# Patient Record
Sex: FEMALE | Race: WHITE | NOT HISPANIC OR LATINO | Employment: OTHER | URBAN - METROPOLITAN AREA
[De-identification: names, ages, dates, MRNs, and addresses within clinical notes are randomized per-mention and may not be internally consistent; named-entity substitution may affect disease eponyms.]

---

## 2017-01-05 ENCOUNTER — OFFICE VISIT - RIVER FALLS (OUTPATIENT)
Dept: FAMILY MEDICINE | Facility: CLINIC | Age: 62
End: 2017-01-05

## 2017-01-27 ENCOUNTER — OFFICE VISIT - RIVER FALLS (OUTPATIENT)
Dept: FAMILY MEDICINE | Facility: CLINIC | Age: 62
End: 2017-01-27

## 2017-07-06 ENCOUNTER — OFFICE VISIT - RIVER FALLS (OUTPATIENT)
Dept: FAMILY MEDICINE | Facility: CLINIC | Age: 62
End: 2017-07-06

## 2017-10-07 ENCOUNTER — AMBULATORY - RIVER FALLS (OUTPATIENT)
Dept: FAMILY MEDICINE | Facility: CLINIC | Age: 62
End: 2017-10-07

## 2017-10-08 LAB
CHOLEST SERPL-MCNC: 267 MG/DL
CHOLEST/HDLC SERPL: 5.6 {RATIO}
GLUCOSE BLD-MCNC: 102 MG/DL (ref 65–99)
HDLC SERPL-MCNC: 48 MG/DL
LDLC SERPL CALC-MCNC: 189 MG/DL
NONHDLC SERPL-MCNC: 219 MG/DL
TRIGL SERPL-MCNC: 156 MG/DL

## 2017-10-12 ENCOUNTER — OFFICE VISIT - RIVER FALLS (OUTPATIENT)
Dept: FAMILY MEDICINE | Facility: CLINIC | Age: 62
End: 2017-10-12

## 2017-10-13 LAB
CREAT SERPL-MCNC: 0.93 MG/DL (ref 0.5–0.99)
GLUCOSE BLD-MCNC: 92 MG/DL (ref 65–99)

## 2017-10-31 ENCOUNTER — OFFICE VISIT - RIVER FALLS (OUTPATIENT)
Dept: FAMILY MEDICINE | Facility: CLINIC | Age: 62
End: 2017-10-31

## 2017-11-07 ENCOUNTER — OFFICE VISIT - RIVER FALLS (OUTPATIENT)
Dept: FAMILY MEDICINE | Facility: CLINIC | Age: 62
End: 2017-11-07

## 2018-01-17 ENCOUNTER — COMMUNICATION - RIVER FALLS (OUTPATIENT)
Dept: FAMILY MEDICINE | Facility: CLINIC | Age: 63
End: 2018-01-17

## 2018-01-17 ENCOUNTER — OFFICE VISIT - RIVER FALLS (OUTPATIENT)
Dept: FAMILY MEDICINE | Facility: CLINIC | Age: 63
End: 2018-01-17

## 2018-01-17 LAB
CREAT SERPL-MCNC: 0.84 MG/DL (ref 0.57–1.11)
GLUCOSE BLD-MCNC: 103 MG/DL (ref 65–100)

## 2018-01-23 ENCOUNTER — OFFICE VISIT - RIVER FALLS (OUTPATIENT)
Dept: FAMILY MEDICINE | Facility: CLINIC | Age: 63
End: 2018-01-23

## 2018-07-12 ENCOUNTER — OFFICE VISIT - RIVER FALLS (OUTPATIENT)
Dept: FAMILY MEDICINE | Facility: CLINIC | Age: 63
End: 2018-07-12

## 2019-02-04 ENCOUNTER — OFFICE VISIT - RIVER FALLS (OUTPATIENT)
Dept: FAMILY MEDICINE | Facility: CLINIC | Age: 64
End: 2019-02-04

## 2019-02-04 ASSESSMENT — MIFFLIN-ST. JEOR: SCORE: 1605.69

## 2019-02-09 ENCOUNTER — AMBULATORY - RIVER FALLS (OUTPATIENT)
Dept: FAMILY MEDICINE | Facility: CLINIC | Age: 64
End: 2019-02-09

## 2019-02-10 LAB
CHOLEST SERPL-MCNC: 267 MG/DL
CHOLEST/HDLC SERPL: 5.2 {RATIO}
GLUCOSE BLD-MCNC: 102 MG/DL (ref 65–99)
HDLC SERPL-MCNC: 51 MG/DL
LDLC SERPL CALC-MCNC: 190 MG/DL
NONHDLC SERPL-MCNC: 216 MG/DL
TRIGL SERPL-MCNC: 128 MG/DL

## 2019-03-05 ENCOUNTER — OFFICE VISIT - RIVER FALLS (OUTPATIENT)
Dept: FAMILY MEDICINE | Facility: CLINIC | Age: 64
End: 2019-03-05

## 2019-03-19 ENCOUNTER — OFFICE VISIT - RIVER FALLS (OUTPATIENT)
Dept: FAMILY MEDICINE | Facility: CLINIC | Age: 64
End: 2019-03-19

## 2019-04-06 ENCOUNTER — COMMUNICATION - RIVER FALLS (OUTPATIENT)
Dept: FAMILY MEDICINE | Facility: CLINIC | Age: 64
End: 2019-04-06

## 2019-04-06 ENCOUNTER — OFFICE VISIT - RIVER FALLS (OUTPATIENT)
Dept: FAMILY MEDICINE | Facility: CLINIC | Age: 64
End: 2019-04-06

## 2019-04-06 LAB
ALBUMIN UR-MCNC: NEGATIVE G/DL
ANION GAP SERPL CALCULATED.3IONS-SCNC: 13 MMOL/L (ref 5–18)
BASOPHILS # BLD MANUAL: 0 10*3/UL
BASOPHILS NFR BLD MANUAL: 0.1 %
BILIRUB UR QL STRIP: NEGATIVE
BUN SERPL-MCNC: 16 MG/DL (ref 8–25)
BUN/CREAT RATIO - HISTORICAL: 20 (ref 10–20)
CALCIUM SERPL-MCNC: 9.7 MG/DL (ref 8.5–10.5)
CHLORIDE BLD-SCNC: 104 MMOL/L (ref 98–110)
CO2 SERPL-SCNC: 26 MMOL/L (ref 21–31)
CREAT SERPL-MCNC: 0.8 MG/DL (ref 0.57–1.11)
EOSINOPHIL # BLD MANUAL: 0.1 10*3/UL
EOSINOPHIL NFR BLD MANUAL: 1 %
ERYTHROCYTE [DISTWIDTH] IN BLOOD BY AUTOMATED COUNT: 14.5 % (ref 11.5–15.5)
GFR ESTIMATE EXT - HISTORICAL: >60
GLUCOSE BLD-MCNC: 100 MG/DL (ref 65–100)
GLUCOSE UR STRIP-MCNC: NEGATIVE MG/DL
HCT VFR BLD AUTO: 40.7 % (ref 33–51)
HGB BLD-MCNC: 13.4 G/DL (ref 12–16)
HGB UR QL STRIP: NEGATIVE
KETONES UR STRIP-MCNC: NEGATIVE MG/DL
LEUKOCYTE ESTERASE UR QL STRIP: NEGATIVE
LYMPHOCYTES # BLD MANUAL: 1.1 10*3/UL (ref 0.9–2.9)
LYMPHOCYTES NFR BLD MANUAL: 13.2 %
MCH RBC QN AUTO: 29 PG (ref 26–34)
MCHC RBC AUTO-ENTMCNC: 32.9 G/DL (ref 32–36)
MCV RBC AUTO: 88 FL (ref 80–100)
MONOCYTES # BLD MANUAL: 0.4 10*3/UL
MONOCYTES NFR BLD MANUAL: 5.1 %
NEUTROPHILS # BLD MANUAL: 6.5 10*3/UL (ref 1.7–7)
NEUTROPHILS NFR BLD MANUAL: 80.6 %
NITRATE UR QL: NEGATIVE
PH UR STRIP: 7 [PH] (ref 5–8)
PLATELET # BLD AUTO: 185 10*3/UL (ref 140–440)
PMV BLD: 10.2 FL (ref 6.5–11)
POTASSIUM BLD-SCNC: 3.9 MMOL/L (ref 3.5–5)
RBC # BLD AUTO: 4.62 10*6/UL (ref 4–5.2)
SODIUM SERPL-SCNC: 143 MMOL/L (ref 135–145)
SP GR UR STRIP: 1.01 (ref 1–1.03)
WBC # BLD AUTO: 8.1 10*3/UL (ref 4.5–11)

## 2019-05-30 ENCOUNTER — OFFICE VISIT - RIVER FALLS (OUTPATIENT)
Dept: FAMILY MEDICINE | Facility: CLINIC | Age: 64
End: 2019-05-30

## 2019-09-19 ENCOUNTER — OFFICE VISIT - RIVER FALLS (OUTPATIENT)
Dept: FAMILY MEDICINE | Facility: CLINIC | Age: 64
End: 2019-09-19
Payer: COMMERCIAL

## 2020-02-20 ENCOUNTER — OFFICE VISIT - RIVER FALLS (OUTPATIENT)
Dept: FAMILY MEDICINE | Facility: CLINIC | Age: 65
End: 2020-02-20
Payer: COMMERCIAL

## 2020-02-20 ENCOUNTER — AMBULATORY - RIVER FALLS (OUTPATIENT)
Dept: FAMILY MEDICINE | Facility: CLINIC | Age: 65
End: 2020-02-20
Payer: COMMERCIAL

## 2020-02-21 ENCOUNTER — COMMUNICATION - RIVER FALLS (OUTPATIENT)
Dept: FAMILY MEDICINE | Facility: CLINIC | Age: 65
End: 2020-02-21
Payer: COMMERCIAL

## 2020-02-21 LAB
CHOLEST SERPL-MCNC: 266 MG/DL
CHOLEST/HDLC SERPL: 5.3 {RATIO}
GLUCOSE BLD-MCNC: 101 MG/DL (ref 65–99)
HDLC SERPL-MCNC: 50 MG/DL
LDLC SERPL CALC-MCNC: 187 MG/DL
NONHDLC SERPL-MCNC: 216 MG/DL
TRIGL SERPL-MCNC: 148 MG/DL

## 2020-03-10 ENCOUNTER — OFFICE VISIT - RIVER FALLS (OUTPATIENT)
Dept: FAMILY MEDICINE | Facility: CLINIC | Age: 65
End: 2020-03-10
Payer: COMMERCIAL

## 2020-03-10 ASSESSMENT — MIFFLIN-ST. JEOR: SCORE: 1638.12

## 2020-08-31 ENCOUNTER — OFFICE VISIT - RIVER FALLS (OUTPATIENT)
Dept: FAMILY MEDICINE | Facility: CLINIC | Age: 65
End: 2020-08-31
Payer: COMMERCIAL

## 2020-09-15 ENCOUNTER — OFFICE VISIT - RIVER FALLS (OUTPATIENT)
Dept: FAMILY MEDICINE | Facility: CLINIC | Age: 65
End: 2020-09-15
Payer: COMMERCIAL

## 2020-11-03 ENCOUNTER — AMBULATORY - RIVER FALLS (OUTPATIENT)
Dept: FAMILY MEDICINE | Facility: CLINIC | Age: 65
End: 2020-11-03
Payer: COMMERCIAL

## 2020-11-03 ENCOUNTER — OFFICE VISIT - RIVER FALLS (OUTPATIENT)
Dept: FAMILY MEDICINE | Facility: CLINIC | Age: 65
End: 2020-11-03
Payer: COMMERCIAL

## 2020-11-08 ENCOUNTER — NURSE TRIAGE (OUTPATIENT)
Dept: NURSING | Facility: CLINIC | Age: 65
End: 2020-11-08

## 2020-11-08 NOTE — TELEPHONE ENCOUNTER
Called for the result of COVID-19 infection testing that was done last Tuesday (11/3).  This triage nurse could not see the result, caller was advised to call [facility] in the morning.  Kate Casiano RN

## 2020-11-09 ENCOUNTER — COMMUNICATION - RIVER FALLS (OUTPATIENT)
Dept: FAMILY MEDICINE | Facility: CLINIC | Age: 65
End: 2020-11-09
Payer: COMMERCIAL

## 2021-01-09 ENCOUNTER — HEALTH MAINTENANCE LETTER (OUTPATIENT)
Age: 66
End: 2021-01-09

## 2021-01-22 ENCOUNTER — AMBULATORY - RIVER FALLS (OUTPATIENT)
Dept: FAMILY MEDICINE | Facility: CLINIC | Age: 66
End: 2021-01-22
Payer: COMMERCIAL

## 2021-01-23 ENCOUNTER — COMMUNICATION - RIVER FALLS (OUTPATIENT)
Dept: FAMILY MEDICINE | Facility: CLINIC | Age: 66
End: 2021-01-23
Payer: COMMERCIAL

## 2021-01-23 LAB
CHOLEST SERPL-MCNC: 255 MG/DL
CHOLEST/HDLC SERPL: 4.9 {RATIO}
GLUCOSE BLD-MCNC: 106 MG/DL (ref 65–99)
HDLC SERPL-MCNC: 52 MG/DL
LDLC SERPL CALC-MCNC: 176 MG/DL
NONHDLC SERPL-MCNC: 203 MG/DL
TRIGL SERPL-MCNC: 132 MG/DL

## 2021-01-28 ENCOUNTER — OFFICE VISIT - RIVER FALLS (OUTPATIENT)
Dept: FAMILY MEDICINE | Facility: CLINIC | Age: 66
End: 2021-01-28
Payer: COMMERCIAL

## 2021-01-28 ASSESSMENT — MIFFLIN-ST. JEOR: SCORE: 1621.79

## 2021-02-01 ENCOUNTER — AMBULATORY - RIVER FALLS (OUTPATIENT)
Dept: FAMILY MEDICINE | Facility: CLINIC | Age: 66
End: 2021-02-01
Payer: COMMERCIAL

## 2021-04-12 ENCOUNTER — OFFICE VISIT - RIVER FALLS (OUTPATIENT)
Dept: FAMILY MEDICINE | Facility: CLINIC | Age: 66
End: 2021-04-12
Payer: COMMERCIAL

## 2021-04-26 ENCOUNTER — OFFICE VISIT - RIVER FALLS (OUTPATIENT)
Dept: FAMILY MEDICINE | Facility: CLINIC | Age: 66
End: 2021-04-26
Payer: COMMERCIAL

## 2021-04-26 ASSESSMENT — MIFFLIN-ST. JEOR: SCORE: 1619.98

## 2021-05-03 ENCOUNTER — COMMUNICATION - RIVER FALLS (OUTPATIENT)
Dept: FAMILY MEDICINE | Facility: CLINIC | Age: 66
End: 2021-05-03
Payer: COMMERCIAL

## 2021-05-25 ENCOUNTER — RECORDS - HEALTHEAST (OUTPATIENT)
Dept: ADMINISTRATIVE | Facility: CLINIC | Age: 66
End: 2021-05-25

## 2021-08-06 ENCOUNTER — OFFICE VISIT - RIVER FALLS (OUTPATIENT)
Dept: FAMILY MEDICINE | Facility: CLINIC | Age: 66
End: 2021-08-06
Payer: COMMERCIAL

## 2021-08-14 ENCOUNTER — OFFICE VISIT - RIVER FALLS (OUTPATIENT)
Dept: FAMILY MEDICINE | Facility: CLINIC | Age: 66
End: 2021-08-14
Payer: COMMERCIAL

## 2021-10-11 ENCOUNTER — HEALTH MAINTENANCE LETTER (OUTPATIENT)
Age: 66
End: 2021-10-11

## 2021-10-20 ENCOUNTER — OFFICE VISIT - RIVER FALLS (OUTPATIENT)
Dept: FAMILY MEDICINE | Facility: CLINIC | Age: 66
End: 2021-10-20
Payer: COMMERCIAL

## 2021-10-20 ENCOUNTER — TRANSFERRED RECORDS (OUTPATIENT)
Dept: HEALTH INFORMATION MANAGEMENT | Facility: CLINIC | Age: 66
End: 2021-10-20
Payer: COMMERCIAL

## 2021-10-20 ENCOUNTER — MEDICAL CORRESPONDENCE (OUTPATIENT)
Dept: HEALTH INFORMATION MANAGEMENT | Facility: CLINIC | Age: 66
End: 2021-10-20
Payer: COMMERCIAL

## 2021-11-02 ENCOUNTER — TELEPHONE (OUTPATIENT)
Dept: ALLERGY | Facility: CLINIC | Age: 66
End: 2021-11-02

## 2021-11-16 ENCOUNTER — OFFICE VISIT (OUTPATIENT)
Dept: ALLERGY | Facility: CLINIC | Age: 66
End: 2021-11-16
Payer: COMMERCIAL

## 2021-11-16 VITALS — OXYGEN SATURATION: 100 % | HEART RATE: 95 BPM | TEMPERATURE: 98.6 F

## 2021-11-16 DIAGNOSIS — J30.1 SEASONAL ALLERGIC RHINITIS DUE TO POLLEN: Primary | ICD-10-CM

## 2021-11-16 DIAGNOSIS — J30.89 ALLERGIC RHINITIS DUE TO DUST MITE: ICD-10-CM

## 2021-11-16 DIAGNOSIS — J30.81 ALLERGIC RHINITIS DUE TO ANIMALS: ICD-10-CM

## 2021-11-16 DIAGNOSIS — J45.30 MILD PERSISTENT ASTHMA WITHOUT COMPLICATION: ICD-10-CM

## 2021-11-16 DIAGNOSIS — T78.1XXD POLLEN-FOOD ALLERGY, SUBSEQUENT ENCOUNTER: ICD-10-CM

## 2021-11-16 PROCEDURE — 86003 ALLG SPEC IGE CRUDE XTRC EA: CPT | Performed by: ALLERGY & IMMUNOLOGY

## 2021-11-16 PROCEDURE — 95004 PERQ TESTS W/ALRGNC XTRCS: CPT | Performed by: ALLERGY & IMMUNOLOGY

## 2021-11-16 PROCEDURE — 99203 OFFICE O/P NEW LOW 30 MIN: CPT | Mod: 25 | Performed by: ALLERGY & IMMUNOLOGY

## 2021-11-16 PROCEDURE — 99000 SPECIMEN HANDLING OFFICE-LAB: CPT | Performed by: ALLERGY & IMMUNOLOGY

## 2021-11-16 PROCEDURE — 86003 ALLG SPEC IGE CRUDE XTRC EA: CPT | Mod: 90 | Performed by: ALLERGY & IMMUNOLOGY

## 2021-11-16 PROCEDURE — 36415 COLL VENOUS BLD VENIPUNCTURE: CPT | Performed by: ALLERGY & IMMUNOLOGY

## 2021-11-16 RX ORDER — AMITRIPTYLINE HYDROCHLORIDE 10 MG/1
10 TABLET ORAL AT BEDTIME
COMMUNITY
End: 2022-11-14

## 2021-11-16 RX ORDER — FLUTICASONE PROPIONATE 50 MCG
2 SPRAY, SUSPENSION (ML) NASAL
COMMUNITY
End: 2022-11-14

## 2021-11-16 RX ORDER — ALBUTEROL SULFATE 90 UG/1
1-2 AEROSOL, METERED RESPIRATORY (INHALATION) EVERY 4 HOURS PRN
COMMUNITY
End: 2022-11-14

## 2021-11-16 RX ORDER — MONTELUKAST SODIUM 10 MG/1
10 TABLET ORAL DAILY
COMMUNITY
Start: 2021-10-20 | End: 2022-03-07

## 2021-11-16 ASSESSMENT — ASTHMA QUESTIONNAIRES
QUESTION_4 LAST FOUR WEEKS HOW OFTEN HAVE YOU USED YOUR RESCUE INHALER OR NEBULIZER MEDICATION (SUCH AS ALBUTEROL): NOT AT ALL
QUESTION_2 LAST FOUR WEEKS HOW OFTEN HAVE YOU HAD SHORTNESS OF BREATH: THREE TO SIX TIMES A WEEK
QUESTION_1 LAST FOUR WEEKS HOW MUCH OF THE TIME DID YOUR ASTHMA KEEP YOU FROM GETTING AS MUCH DONE AT WORK, SCHOOL OR AT HOME: A LITTLE OF THE TIME
ACT_TOTALSCORE: 21
QUESTION_5 LAST FOUR WEEKS HOW WOULD YOU RATE YOUR ASTHMA CONTROL: WELL CONTROLLED
QUESTION_3 LAST FOUR WEEKS HOW OFTEN DID YOUR ASTHMA SYMPTOMS (WHEEZING, COUGHING, SHORTNESS OF BREATH, CHEST TIGHTNESS OR PAIN) WAKE YOU UP AT NIGHT OR EARLIER THAN USUAL IN THE MORNING: NOT AT ALL

## 2021-11-16 NOTE — LETTER
11/16/2021         RE: María Elena Whitten  [address] [facility] [address]        Dear Colleague,    Thank you for referring your patient, María Elena Whitten, to the [facility]. Allergy testing was inconclusive, so I have asked her to undergo further testing. Please see a copy of my visit note below.          Subjective       HPI     Chief complaint: Allergies    History of present illness: This is a pleasant 66-year-old woman I was asked to see for evaluation by Dr. Mcfadden in regards to allergies.  Patient states she had allergies her whole life and symptoms occur throughout the year.  She has itchy eyes, watery eyes, sneezing and drainage.  She has tried several over-the-counter medications such as Allegra.  This does not seem to help.  She does use Flonase every day.  She states recently she was started on montelukast but she is not sure is helping.  She does have a history of asthma that is triggered by her allergies but reports that her asthma is well controlled.  She denies any immediate cough, wheeze or shortness of breath.  She currently takes Advair 250/50 1 puff twice daily.  Rare need for her albuterol inhaler.  She was tested for allergies many years ago.  She was not on allergy shots at that time but states she was as a child and had several reactions to her allergy shots.  She does note with bita, she has experienced an allergic reaction.    Past medical history: Cholecystitis, osteopenia, reflux, arthritis, tonsillectomy, cholecystectomy, hysterectomy    Social history: She is retired, she has cats at home, non-smoking environment, lives in a home with central air and a basement    Family history: Mother with allergies    Review of Systems   Constitutional, HEENT, cardiovascular, pulmonary, gi and gu systems are negative, except as otherwise noted.      Objective    Pulse 95   Temp 98.6  F (37  C)   SpO2 100%   There is no height or weight on file to calculate  BMI.  Physical Exam   Gen: Pleasant female not in acute distress  HEENT: Eyes no erythema of the bulbar or palpebral conjunctiva, no edema. Ears: No deformities or lesions nose: No congestion, mucosa normal. Mouth: Throat clear, no lip or tongue edema.   Cardiac: Regular rate and rhythm, no murmurs, rubs or gallops  Respiratory: Clear to auscultation bilaterally, no adventitious breath sounds  Lymph: No visible supraclavicular or cervical lymphadenopathy  Skin: No rashes or lesions  Psych: Alert and oriented times 3    30 percutaneous test were undertaken today environmental skin test panel positive histamine control and a positive test to ragweed, however, she did not have a flare with the ragweed response.  Please see scanned photograph.    Impression report and plan:  1.  Allergic rhinitis  2.  Likely oral allergy syndrome  3.  Asthma    Her allergy testing was not as positive as I was expecting.  This could be due to the fact that she takes amitriptyline.  I asked her to undergo specific IgE testing as well.  Pending this test, could consider allergy immunotherapy.  I went over the risks and benefits of allergy shots.  I stated risks include hives, swelling, shortness of breath.  I did state that one in 2.5 million shot administrations can result in death.  I stated they must wait in the office for 30 minutes following the shot and carry an epinephrine device on the day of the shot.  I stated that shots are effective in about 90% of patients.  I stated that they should check with the insurance company prior to proceeding.  They understand the risks and benefits and will let me know if she would like to proceed.  For now, suggested Astelin nasal spray.  Her asthma is well controlled with Advair.  I would continue her on this medication.  Notify if using albuterol greater than 2 times per Opal exercise.  Continued avoidance of melons.            Again, thank you for allowing me to participate in the care of  your patient.        Sincerely,        Diane MOBLEY MD

## 2021-11-16 NOTE — PROGRESS NOTES
Subjective       HPI     Chief complaint: Allergies    History of present illness: This is a pleasant 66-year-old woman I was asked to see for evaluation by Dr. Mcfadden in regards to allergies.  Patient states she had allergies her whole life and symptoms occur throughout the year.  She has itchy eyes, watery eyes, sneezing and drainage.  She has tried several over-the-counter medications such as Allegra.  This does not seem to help.  She does use Flonase every day.  She states recently she was started on montelukast but she is not sure is helping.  She does have a history of asthma that is triggered by her allergies but reports that her asthma is well controlled.  She denies any immediate cough, wheeze or shortness of breath.  She currently takes Advair 250/50 1 puff twice daily.  Rare need for her albuterol inhaler.  She was tested for allergies many years ago.  She was not on allergy shots at that time but states she was as a child and had several reactions to her allergy shots.  She does note with bita, she has experienced an allergic reaction.    Past medical history: Cholecystitis, osteopenia, reflux, arthritis, tonsillectomy, cholecystectomy, hysterectomy    Social history: She is retired, she has cats at home, non-smoking environment, lives in a home with central air and a basement    Family history: Mother with allergies    Review of Systems   Constitutional, HEENT, cardiovascular, pulmonary, gi and gu systems are negative, except as otherwise noted.      Objective    Pulse 95   Temp 98.6  F (37  C)   SpO2 100%   There is no height or weight on file to calculate BMI.  Physical Exam   Gen: Pleasant female not in acute distress  HEENT: Eyes no erythema of the bulbar or palpebral conjunctiva, no edema. Ears: No deformities or lesions nose: No congestion, mucosa normal. Mouth: Throat clear, no lip or tongue edema.   Cardiac: Regular rate and rhythm, no murmurs, rubs or gallops  Respiratory: Clear to  auscultation bilaterally, no adventitious breath sounds  Lymph: No visible supraclavicular or cervical lymphadenopathy  Skin: No rashes or lesions  Psych: Alert and oriented times 3    30 percutaneous test were undertaken today environmental skin test panel positive histamine control and a positive test to ragweed, however, she did not have a flare with the ragweed response.  Please see scanned photograph.    Impression report and plan:  1.  Allergic rhinitis  2.  Likely oral allergy syndrome  3.  Asthma    Her allergy testing was not as positive as I was expecting.  This could be due to the fact that she takes amitriptyline.  I asked her to undergo specific IgE testing as well.  Pending this test, could consider allergy immunotherapy.  I went over the risks and benefits of allergy shots.  I stated risks include hives, swelling, shortness of breath.  I did state that one in 2.5 million shot administrations can result in death.  I stated they must wait in the office for 30 minutes following the shot and carry an epinephrine device on the day of the shot.  I stated that shots are effective in about 90% of patients.  I stated that they should check with the insurance company prior to proceeding.  They understand the risks and benefits and will let me know if she would like to proceed.  For now, suggested Astelin nasal spray.  Her asthma is well controlled with Advair.  I would continue her on this medication.  Notify if using albuterol greater than 2 times per Opal exercise.  Continued avoidance of melons.

## 2021-11-17 ASSESSMENT — ASTHMA QUESTIONNAIRES: ACT_TOTALSCORE: 21

## 2021-11-18 DIAGNOSIS — J31.0 CHRONIC RHINITIS: Primary | ICD-10-CM

## 2021-11-18 LAB
A ALTERNATA IGE QN: <0.1 KU(A)/L
A FUMIGATUS IGE QN: <0.1 KU(A)/L
C HERBARUM IGE QN: <0.1 KU(A)/L
CAT DANDER IGG QN: <0.1 KU(A)/L
COTTONWOOD IGE QN: <0.1 KU(A)/L
D FARINAE IGE QN: <0.1 KU(A)/L
D PTERONYSS IGE QN: <0.1 KU(A)/L
DOG DANDER+EPITH IGE QN: <0.1 KU(A)/L
ENGL PLANTAIN IGE QN: <0.1 KU(A)/L
GOOSEFOOT IGE QN: <0.1 KU(A)/L
JOHNSON GRASS IGE QN: <0.1 KU(A)/L
MAPLE IGE QN: <0.1 KU(A)/L
MUGWORT IGE QN: <0.1 KU(A)/L
P NOTATUM IGE QN: <0.1 KU(A)/L
RED MULBERRY IGE QN: <0.1 KU(A)/L
SILVER BIRCH IGE QN: <0.1 KU(A)/L
TIMOTHY IGE QN: <0.1 KU(A)/L
WHITE ASH IGE QN: <0.1 KU(A)/L
WHITE ELM IGE QN: <0.1 KU(A)/L
WHITE OAK IGE QN: <0.1 KU(A)/L
YELLOW DOCK IGE QN: <0.1 KU(A)/L

## 2021-11-18 RX ORDER — AZELASTINE 1 MG/ML
2 SPRAY, METERED NASAL 2 TIMES DAILY
Qty: 30 ML | Refills: 3 | Status: SHIPPED | OUTPATIENT
Start: 2021-11-18 | End: 2022-03-07

## 2021-11-23 LAB — WHITE HICKORY IGE QN: <0.35 KU/L

## 2022-01-07 ENCOUNTER — OFFICE VISIT - RIVER FALLS (OUTPATIENT)
Dept: FAMILY MEDICINE | Facility: CLINIC | Age: 67
End: 2022-01-07
Payer: COMMERCIAL

## 2022-01-31 ENCOUNTER — OFFICE VISIT - RIVER FALLS (OUTPATIENT)
Dept: FAMILY MEDICINE | Facility: CLINIC | Age: 67
End: 2022-01-31
Payer: COMMERCIAL

## 2022-01-31 ENCOUNTER — TRANSFERRED RECORDS (OUTPATIENT)
Dept: MULTI SPECIALTY CLINIC | Facility: CLINIC | Age: 67
End: 2022-01-31
Payer: COMMERCIAL

## 2022-02-01 ENCOUNTER — COMMUNICATION - RIVER FALLS (OUTPATIENT)
Dept: FAMILY MEDICINE | Facility: CLINIC | Age: 67
End: 2022-02-01
Payer: COMMERCIAL

## 2022-02-01 LAB
CHOLEST SERPL-MCNC: 257 MG/DL
CHOLEST/HDLC SERPL: 5 {RATIO}
GLUCOSE BLD-MCNC: 110 MG/DL (ref 65–99)
HDLC SERPL-MCNC: 51 MG/DL
LDLC SERPL CALC-MCNC: 185 MG/DL
NONHDLC SERPL-MCNC: 206 MG/DL
TRIGL SERPL-MCNC: 94 MG/DL

## 2022-02-06 ENCOUNTER — HEALTH MAINTENANCE LETTER (OUTPATIENT)
Age: 67
End: 2022-02-06

## 2022-02-11 VITALS
WEIGHT: 253.6 LBS | DIASTOLIC BLOOD PRESSURE: 82 MMHG | HEART RATE: 89 BPM | TEMPERATURE: 98.6 F | BODY MASS INDEX: 48.71 KG/M2 | OXYGEN SATURATION: 96 % | SYSTOLIC BLOOD PRESSURE: 148 MMHG

## 2022-02-11 VITALS
DIASTOLIC BLOOD PRESSURE: 82 MMHG | HEART RATE: 80 BPM | BODY MASS INDEX: 47.8 KG/M2 | TEMPERATURE: 98 F | DIASTOLIC BLOOD PRESSURE: 76 MMHG | HEIGHT: 61 IN | SYSTOLIC BLOOD PRESSURE: 132 MMHG | BODY MASS INDEX: 47.8 KG/M2 | HEIGHT: 61 IN | SYSTOLIC BLOOD PRESSURE: 128 MMHG | HEART RATE: 92 BPM | TEMPERATURE: 98.1 F | OXYGEN SATURATION: 99 % | OXYGEN SATURATION: 99 %

## 2022-02-11 VITALS
BODY MASS INDEX: 48.71 KG/M2 | DIASTOLIC BLOOD PRESSURE: 80 MMHG | TEMPERATURE: 99.2 F | HEIGHT: 61 IN | SYSTOLIC BLOOD PRESSURE: 140 MMHG | HEART RATE: 76 BPM

## 2022-02-11 VITALS
WEIGHT: 253 LBS | DIASTOLIC BLOOD PRESSURE: 72 MMHG | HEART RATE: 82 BPM | BODY MASS INDEX: 47.77 KG/M2 | SYSTOLIC BLOOD PRESSURE: 126 MMHG | HEIGHT: 61 IN | BODY MASS INDEX: 48.56 KG/M2 | TEMPERATURE: 97 F | HEART RATE: 60 BPM | SYSTOLIC BLOOD PRESSURE: 112 MMHG | HEIGHT: 61 IN | DIASTOLIC BLOOD PRESSURE: 78 MMHG

## 2022-02-11 VITALS
HEART RATE: 82 BPM | BODY MASS INDEX: 47.84 KG/M2 | DIASTOLIC BLOOD PRESSURE: 76 MMHG | WEIGHT: 253.4 LBS | TEMPERATURE: 97.5 F | SYSTOLIC BLOOD PRESSURE: 132 MMHG | HEIGHT: 61 IN

## 2022-02-11 VITALS
HEART RATE: 86 BPM | SYSTOLIC BLOOD PRESSURE: 126 MMHG | SYSTOLIC BLOOD PRESSURE: 138 MMHG | TEMPERATURE: 97.4 F | BODY MASS INDEX: 48.52 KG/M2 | HEIGHT: 61 IN | HEIGHT: 61 IN | DIASTOLIC BLOOD PRESSURE: 83 MMHG | DIASTOLIC BLOOD PRESSURE: 78 MMHG | WEIGHT: 257 LBS | TEMPERATURE: 97.4 F | HEART RATE: 74 BPM

## 2022-02-11 VITALS
SYSTOLIC BLOOD PRESSURE: 138 MMHG | HEART RATE: 74 BPM | WEIGHT: 251.6 LBS | HEIGHT: 61 IN | DIASTOLIC BLOOD PRESSURE: 80 MMHG | TEMPERATURE: 97.7 F | BODY MASS INDEX: 47.5 KG/M2

## 2022-02-11 VITALS
SYSTOLIC BLOOD PRESSURE: 130 MMHG | TEMPERATURE: 99.9 F | HEART RATE: 101 BPM | DIASTOLIC BLOOD PRESSURE: 76 MMHG | HEART RATE: 86 BPM | DIASTOLIC BLOOD PRESSURE: 60 MMHG | SYSTOLIC BLOOD PRESSURE: 107 MMHG | HEIGHT: 61 IN | BODY MASS INDEX: 49.94 KG/M2 | TEMPERATURE: 98.7 F

## 2022-02-11 VITALS
SYSTOLIC BLOOD PRESSURE: 142 MMHG | BODY MASS INDEX: 49.94 KG/M2 | TEMPERATURE: 98.2 F | HEART RATE: 86 BPM | SYSTOLIC BLOOD PRESSURE: 140 MMHG | HEIGHT: 61 IN | HEART RATE: 76 BPM | DIASTOLIC BLOOD PRESSURE: 78 MMHG | TEMPERATURE: 98.7 F | DIASTOLIC BLOOD PRESSURE: 80 MMHG

## 2022-02-11 VITALS — DIASTOLIC BLOOD PRESSURE: 80 MMHG | TEMPERATURE: 98.9 F | SYSTOLIC BLOOD PRESSURE: 126 MMHG | HEART RATE: 74 BPM

## 2022-02-11 VITALS — HEART RATE: 84 BPM | DIASTOLIC BLOOD PRESSURE: 78 MMHG | TEMPERATURE: 99.2 F | SYSTOLIC BLOOD PRESSURE: 134 MMHG

## 2022-02-11 VITALS
TEMPERATURE: 98.4 F | BODY MASS INDEX: 48.56 KG/M2 | SYSTOLIC BLOOD PRESSURE: 128 MMHG | HEIGHT: 61 IN | DIASTOLIC BLOOD PRESSURE: 86 MMHG | HEART RATE: 80 BPM

## 2022-02-15 NOTE — TELEPHONE ENCOUNTER
Entered by Nikki Cooley CMA on November 09, 2020 10:49:32 AM CST  Kumar Ring,    We do not have your COVID 19 results back yet. We will notify you once we received the results. Our labs have seen an influx of COVID tests which has caused longer wait times for results.     We do apologize    Nikki      ---------------------  From: MARÍA ELENA ESPARZA  To: [facility]  Sent: 11/09/2020 10:32 a.m. CST  Subject: Covid Test results  I was tested last Tuesday and have not received any results yet.  The curbside is in my portal and nothing beyond that.  Could someone please find this information for me.  María Elena Esparza  [phone]---------------------  From: Nikki Cooley CMA (Phone Messages Pool ([id number]_[facility]))   To: MARÍA ELENA ESPARZA    Sent: 11/9/2020 10:49:46 AM CST  Subject: RE: Covid Test results

## 2022-02-15 NOTE — PROGRESS NOTES
Chief Complaint    Shoulder pain on the right side.  Lifted something heavy.  Fell into truck.  History of Present Illness       Lifted a heavy box on Saturday. Fell into truck and right shoulder hit truck. Certain movements cause pain.  Review of Systems       No weakness       No numbness  Physical Exam   Vitals & Measurements    T: 97  F (Tympanic)  HR: 60 (Peripheral)  BP: 126/78     HT: 61 in        General: No acute distress       Musculoskeletal: Normal gait.  Good strength and range of motion of the right upper extremity.  Tender over the distal clavicle.  Has some pain with internal rotation.  Rotator cuff muscles intact.  Neer's and Guzman tests are negative.  Speeds maneuver and Yergason's maneuver are negative       Skin: No bruising or swelling  Assessment/Plan       Right shoulder pain: Contusion and sprain.  Discussed muscle strengthening exercises.  We will continue Advil up to 800 mg 3 times daily with food.  Follow-up if not improving.  Patient Information     Name:ANDRADE ESPARZA      Address:      [address] [place] [phone]     Sex:Female     YOB: 1955     [phone]     Emergency Contact:TOMASA HANSON     MRN:[id number]     FIN:[id number]     Location:[facility]     Date of Service:04/12/2021      Primary Care Physician:       Jack Mcfadden MD, [phone]      Attending Physician:       Jack Mcfadden MD, [phone]  Problem List/Past Medical History    Ongoing     Allergic asthma       Comments: fall/spring- mild persistant     Cholecystitis     Dyslipidemia     GERD (gastroesophageal reflux disease)     History of adenomatous polyp of colon     IBS (irritable bowel syndrome)     Obese     Osteopenia     Prediabetes    Historical     Chicken pox  Medications    Advair Diskus 100 mcg-50 mcg inhalation powder, 1 puff(s), Inhale, bid, 11 refills    albuterol 90 mcg/inh inhalation aerosol, 2 puff(s), Inhale, 1-4x/day, PRN, 2 refills    amitriptyline  10 mg oral tablet, 20 mg= 2 tab(s), Oral, hs, 3 refills    Cipro 500 mg oral tablet, 500 mg= 1 tab(s), Oral, q12 hrs    EpiPen 2-Wilver 0.3 mg injectable kit, 0.3 mg, IM, once, 1 refills    hyoscyamine 0.125 mg oral tablet, 0.125 mg= 1 tab(s), Oral, qid, PRN, 3 refills    metroNIDAZOLE 500 mg oral tablet, 500 mg= 1 tab(s), Oral, q8 hrs    Nasonex 50 mcg/inh nasal spray, 2 spray(s), Nasal, daily, PRN, 3 refills    pantoprazole 40 mg oral delayed release tablet, 1 tab(s), Oral, daily, 3 refills  Allergies    Augmentin (hives)    Zithromax (nausea, diarrhea)    doxycycline (Hives)

## 2022-02-15 NOTE — PROGRESS NOTES
Patient:   ANDRADE ESPARZA            MRN: [id number]            FIN: [id number]               Age:   64 years     Sex:  Female     :  1955   Associated Diagnoses:   Right shoulder pain; Prediabetes; IBS (irritable bowel syndrome); GERD (gastroesophageal reflux disease); Dyslipidemia; Allergic asthma   Author:   Jack Mcfadden MD      Visit Information      Date of Service: 03/10/2020 02:20 pm  Performing Location: [facility]  Encounter#: [id number]      Primary Care Provider (PCP):  Jack Mcfadden MD    NPI# [id number]      Referring Provider:  Jack Mcfadden MD    NPI# [id number]      Chief Complaint   3/10/2020 2:33 PM CDT    Patient presents with ongoing pain in right shoulder. States there is a clicking noise      History of Present Illness   Tripped on pot and fell backwards hitting right shoulder on the truck 3-4 weeks ago. Pain improved 50-60% with naprosyn (upset stomach) and muscle strengthening exercises. Still has some clicking and pain when reaching forward for things.     IBS well controlled.  Asthma controlled with advair. Uses it twice daily during allergy season and otherwise once daily.    Rarely needs the hyoscyamine. Imodium helps.  Protonix helps acid reflux. Missed for two days and having symptoms  Had a low stress year and IBS controlled. Probiotic has even helped.  Had pollen related anaphylactic reaction in past.    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 Feels she has ribs popping in and out place at time dependent on the amount of lifting and carrying. Pain decreases with pressing on chest. Happens couple times a month      Review of Systems   Constitutional:  No fever.    No weakness in the right arm  No numbness or tingling    Respiratory:  asthma and  allergic rhinitis are controlled with her medications. rarely needs albuterol inhaler.  Congested with plants being indoors this year  Gastrointestinal:  abdominal cramping and diarrhea continues to be improved on amitriptyline, heartburn controlled by protonix.    Gynecologic:  no longer having hot flashes.    Neurologic:  tinnitus bilateral starting 2011 but decreased with hearing aides.    Psychiatric: PHQ-9: 0pts (March 2020). 1pt (2014); 4pts (2013).    ACT: 23pts (March 2020). 25pts (February 2019). 24pts (January 2018). 25pts (January 2017)   Cardiovascular:  No chest pain.    Nondrinker. Had allergic reaction age 22 to alcohol      Health Status   Allergies:    Allergic Reactions (Selected)  Severity Not Documented  Augmentin (Hives)  Doxycycline (Hives)  Zithromax (Nausea, diarrhea)   Medications:  (Selected)   Prescriptions  Prescribed  Advair Diskus 100 mcg-50 mcg inhalation powder: 1 puff(s), inh, bid, Instructions: fill when needed, # 60 EA, 11 Refill(s), Type: Maintenance, Pharmacy: [facility]/pharmacy #[id number], due for appointment now, 1 puff(s) Inhale bid,Instr:fill when needed  Cipro 500 mg oral tablet: = 1 tab(s) ( 500 mg ), PO, q12hr, # 20 tab(s), 1 Refill(s), Type: Maintenance, Pharmacy: [facility]/pharmacy #[id number], 1 tab(s) Oral q12 hrs,x10 day(s)  EpiPen 2-Wilver 0.3 mg injectable kit: ( 0.3 mg ), IM, Once, # 1 EA, 1 Refill(s), Type: Soft Stop, Pharmacy: [facility] #[id number], 0.3 mg im once  albuterol 90 mcg/inh inhalation aerosol: 2 puff(s), INH, 1-4x/day, Instructions: aerochamber with valve, PRN: for wheezing, # 17 g, 2 Refill(s), Type: Maintenance, Pharmacy: [facility] #[id number], 2 puff(s) inh 1-4x/day,PRN:for wheezing,Instr:aerochamber with valve  amitriptyline 10 mg oral tablet: = 2 tab(s) ( 20 mg ), po, hs, Instructions: fill when needed, # 190 tab(s), 3 Refill(s), Type: Maintenance, Pharmacy: [facility]/pharmacy #[id number], 2 tab(s) Oral hs,Instr:fill when needed  fluticasone 50 mcg/inh nasal spray: = 2 spray(s),  nasal, daily, Instructions: fill when needed, # 3 EA, 4 Refill(s), Type: Maintenance, [id number], 2 spray(s) Nasal daily,Instr:fill when needed  hyoscyamine 0.125 mg oral tablet: = 1 tab(s) ( 0.125 mg ), PO, QID, Instructions: fill when needed, PRN: for loose stool, # 10 tab(s), 3 Refill(s), Type: Maintenance, [id number], 1 tab(s) Oral qid,PRN:for loose stool,Instr:fill when needed  metroNIDAZOLE 500 mg oral tablet: = 1 tab(s) ( 500 mg ), PO, q8hr, # 30 tab(s), 1 Refill(s), Type: Maintenance, Pharmacy: [facility] #[id number], 1 tab(s) Oral q8 hrs,x10 day(s)  pantoprazole 40 mg oral delayed release tablet: = 1 tab(s), Oral, daily, Instructions: fill when needed, # 95 tab(s), 3 Refill(s), RAVEN, Type: Maintenance, Pharmacy: [facility] #[id number], Pt due for clinic visit., 1 tab(s) Oral daily,Instr:fill when needed   Problem list:    All Problems  Allergic asthma / SNOMED CT 3657930978 / Confirmed  Cholecystitis / SNOMED CT 505623421 / Confirmed  Dyslipidemia / SNOMED CT 9556057324 / Confirmed  GERD (gastroesophageal reflux disease) / SNOMED CT 548146185 / Confirmed  History of adenomatous polyp of colon / SNOMED CT 2470897862 / Confirmed  IBS (irritable bowel syndrome) / SNOMED CT 48946194 / Confirmed  Obese / SNOMED CT 6399598864 / Probable  Osteopenia / SNOMED CT 992864358 / Confirmed  Prediabetes / SNOMED CT 8989174852 / Confirmed  Resolved: Chicken pox / SNOMED CT 15291147  Canceled: Tubulovillous adenoma / SNOMED CT 23493278      Histories   Social History: [employer] since  and retired 2019. Mom had lived with her and  . Has no immediate family  Family History: Father  heart attack; Mom  age 83. Brother  MVA      Physical Examination   Vital Signs   3/10/2020 2:33 PM CDT Temperature Tympanic 97.4 DegF  LOW    Peripheral Pulse Rate 86 bpm    Systolic Blood Pressure 126 mmHg    Diastolic Blood Pressure 83 mmHg  HI    Mean Arterial Pressure 97  mmHg    BP Site Right arm    BP Method Electronic      Measurements from flowsheet : Measurements   3/10/2020 2:33 PM CDT Height Measured - Standard 60.5 in    Weight Measured - Standard 257 lb    BSA 2.23 m2    Body Mass Index 49.36 kg/m2  HI      General:  Alert and oriented, No acute distress.    Neck:  No lymphadenopathy.    Respiratory:  Lungs are clear to auscultation.    Cardiovascular:  Normal rate, Regular rhythm.    Breast:  No mass, No tenderness, No discharge.    Gastrointestinal:  Soft, Non-tender, Non-distended.    Musculoskeletal:  Normal range of motion, Normal strength, Normal gait.    Skin: no bruising or swelling  Normal strength in rotator cuff muscles. No tenderness. Mild Guzman test positive. Neers, Speeds and Yergasons test are negative. Excellent ROM right shoulder      Impression and Plan   Diagnosis     Right shoulder pain (KRI65-RP M25.511).     Prediabetes (UBB16-WV R73.03).     IBS (irritable bowel syndrome) (JVK89-MP K58.9).     GERD (gastroesophageal reflux disease) (YTT10-XC K21.9).     Dyslipidemia (HLF89-HR E78.5).     Allergic asthma (CGU62-WO J45.909).       Right shoulder pain: likely continue to recover from the clavicle contusion. Seems good progress and continue with muscle strengthening exercises. Declines physical therapy. Follow up if not improving.    Irritable Bowel Syndrome: renewed amitriptyline and hyocsyamine. Still taking probiotic with cinnamon and fish oil  Dyslipidemia: LDL range 175-228. Calcium score of zero (2012).  Breast Cancer screening: Biopsy with benign fibroadenoma July 2017. Normal mammogram July 2018 and August 2019. Normal exam  GERD: using protonix with good beneft and refilled (zantac and prilosec did not help). Will try to taper down the dose  Allergic asthma: continue advair and flonase and albuterol  Colon cancer screening: normal 2004. Tubulovillous adenoma 2014 and tubular adenoma 8mm October 2017 with repeat October 2022  Immunizations:  Pneumovax 2011 and repeat age 64. Adacel 2008 and 2018. Zostavax 2014  Osteopenia: DEXA March 2016 with hip -1.2 and spine 1.3.  Prediabetes: stable  Hearing: Hearing aides working well  Anaphylactic reaction: from pollen and given epinephrine. Declines epipen refill due to cost. Has never used the epipen.  Renewed medications March 2020    Discussed end of life of issues: no immediate family available  Desires CPR and ventilator if needed but if no improvement within a week consider a removal. Currently in process of finding a new HCPOA

## 2022-02-15 NOTE — NURSING NOTE
Comprehensive Intake Entered On:  2021 3:10 PM CDT    Performed On:  2021 3:02 PM CDT by Myla Tyler CMA               Summary   Chief Complaint :   Cat bites and scratches on both forearms. Judging at [facility] today. Cat is UTD on vaccines. Tdap 18.     Advance Directive :   No   Menstrual Status :   Hysterectomy   Ht/Wt Measurement Refused by Patient? :   Yes   Systolic Blood Pressure :   134 mmHg (HI)    Diastolic Blood Pressure :   78 mmHg   Mean Arterial Pressure :   97 mmHg   Peripheral Pulse Rate :   84 bpm   BP Site :   Right arm   Pulse Site :   Radial artery   BP Method :   Manual   HR Method :   Manual   Temperature Tympanic :   99.2 DegF(Converted to: 37.3 DegC)    Race :      Languages :   English   Ethnicity :   Not  or    Myla Tyler CMA - 2021 3:02 PM CDT   Health Status   Allergies Verified? :   Yes   Medication History Verified? :   Yes   Pre-Visit Planning Status :   Not completed   Myla Tyler CMA - 2021 3:02 PM CDT   Demographics   Last Name :   Fish   Address :   [address] [place] [address]   First Name :   María Elena Polk Initial :   hao   Responsible Party Date of Birth () :   1955 CDT   City :   [place]   State :   [place]   Zip Code :   [address]   Contact Relationship to Patient (other) :   Patient   Nayeli KATHERINE Myla - 2021 3:02 PM CDT   Ancillary Services Grid   Name :    [facility]   [facility]           Location :    [place]                Myla Tyler CMA - 2021 3:02 PM CDT  Myla Tyler CMA - 2021 3:02 PM CDT        Consents   Consent for Immunization Exchange :   Consent Granted   Consent for Immunizations to Providers :   Consent Granted   Myla Tyler CMA - 2021 3:02 PM CDT   Meds / Allergies   (As Of: 2021 3:10:11 PM CDT)   Allergies (Active)   Augmentin  Estimated Onset Date:   Unspecified ; Reactions:   hives ; Created By:   Myla Tyler CMA; Reaction Status:   Active ;  Category:   Drug ; Substance:   Augmentin ; Type:   Allergy ; Updated By:   Myla Tyler CMA; Reviewed Date:   4/26/2021 2:28 PM CDT      doxycycline  Estimated Onset Date:   <not entered> 8/1/1997 ; Reactions:   Hives ; Created By:   Belen Adams; Reaction Status:   Active ; Category:   Drug ; Substance:   doxycycline ; Type:   Allergy ; Updated By:   Belen Adams; Source:   Paper Chart ; Reviewed Date:   4/26/2021 2:28 PM CDT      Zithromax  Estimated Onset Date:   Unspecified ; Reactions:   nausea, diarrhea ; Created By:   Nicole Spicer CMA; Reaction Status:   Active ; Category:   Drug ; Substance:   Zithromax ; Type:   Allergy ; Updated By:   Nicole Spicer CMA; Source:   Patient ; Reviewed Date:   4/26/2021 2:28 PM CDT        Medication List   (As Of: 8/6/2021 3:10:11 PM CDT)   Prescription/Discharge Order    albuterol  :   albuterol ; Status:   Prescribed ; Ordered As Mnemonic:   albuterol 90 mcg/inh inhalation aerosol ; Simple Display Line:   2 puff(s), INH, 1-4x/day, aerochamber with valve, PRN: for wheezing, 17 g, 2 Refill(s) ; Ordering Provider:   Jack Mcfadden MD; Catalog Code:   albuterol ; Order Dt/Tm:   1/28/2021 11:50:06 AM CST          amitriptyline  :   amitriptyline ; Status:   Prescribed ; Ordered As Mnemonic:   amitriptyline 10 mg oral tablet ; Simple Display Line:   20 mg, 2 tab(s), po, hs, fill when needed, 190 tab(s), 3 Refill(s) ; Ordering Provider:   Jack Mcfadden MD; Catalog Code:   amitriptyline ; Order Dt/Tm:   1/28/2021 11:49:21 AM CST          EPINEPHrine  :   EPINEPHrine ; Status:   Prescribed ; Ordered As Mnemonic:   EpiPen 2-Wilver 0.3 mg injectable kit ; Simple Display Line:   0.3 mg, IM, Once, 1 EA, 1 Refill(s) ; Ordering Provider:   Jack Mcfadden MD; Catalog Code:   EPINEPHrine ; Order Dt/Tm:   1/23/2018 4:18:12 PM CST          fluticasone-salmeterol  :   fluticasone-salmeterol ; Status:   Prescribed ; Ordered As Mnemonic:   Advair Diskus 100  mcg-50 mcg inhalation powder ; Simple Display Line:   1 puff(s), inh, bid, fill when needed, 60 EA, 11 Refill(s) ; Ordering Provider:   Jack Mcfadden MD; Catalog Code:   fluticasone-salmeterol ; Order Dt/Tm:   1/28/2021 11:49:53 AM CST          fluticasone nasal  :   fluticasone nasal ; Status:   Suspended ; Ordered As Mnemonic:   fluticasone 50 mcg/inh nasal spray ; Simple Display Line:   2 spray(s), nasal, daily, fill when needed, 3 EA, 4 Refill(s) ; Ordering Provider:   Jack Mcfadden MD; Catalog Code:   fluticasone nasal ; Order Dt/Tm:   3/10/2020 2:55:06 PM CDT          hyoscyamine  :   hyoscyamine ; Status:   Prescribed ; Ordered As Mnemonic:   hyoscyamine 0.125 mg oral tablet ; Simple Display Line:   0.125 mg, 1 tab(s), PO, QID, fill when needed, PRN: for loose stool, 10 tab(s), 3 Refill(s) ; Ordering Provider:   Jack Mcfadden MD; Catalog Code:   hyoscyamine ; Order Dt/Tm:   1/28/2021 11:48:43 AM CST          mometasone nasal  :   mometasone nasal ; Status:   Prescribed ; Ordered As Mnemonic:   Nasonex 50 mcg/inh nasal spray ; Simple Display Line:   2 spray(s), Nasal, daily, PRN: for allergy symptoms, 3 EA, 3 Refill(s) ; Ordering Provider:   Jack Mcfadden MD; Catalog Code:   mometasone nasal ; Order Dt/Tm:   1/28/2021 11:50:14 AM CST          pantoprazole  :   pantoprazole ; Status:   Prescribed ; Ordered As Mnemonic:   pantoprazole 40 mg oral delayed release tablet ; Simple Display Line:   1 tab(s), Oral, daily, fill when needed, 95 tab(s), 3 Refill(s) ; Ordering Provider:   Jack Mcfadden MD; Catalog Code:   pantoprazole ; Order Dt/Tm:   1/28/2021 11:49:06 AM CST

## 2022-02-15 NOTE — PROGRESS NOTES
Patient:   ANDRADE ESPARZA            MRN: [id number]            FIN: [id number]               Age:   64 years     Sex:  Female     :  1955   Associated Diagnoses:   Pain of right clavicle   Author:   Jack Mcfadden MD      Visit Information      Date of Service: 2020 09:17 am  Performing Location: [facility]  Encounter#: [id number]      Primary Care Provider (PCP):  Jack Mcfadden MD    NPI# [id number]      Referring Provider:  Jack Mcfadden MD    NPI# [id number]      Chief Complaint   2020 9:25 AM CST    c/o right shoulder pain for the past 3 weeks after tripping and hitting it on a flower pot      History of Present Illness   Tripped on pot and fell backwards hitting right shoulder on the truck. Seemed to be getting better and then worked on cabinets and used screwdriver a lot. Getting better again but certain movements with adduction and internal rotation feels a click and pain.      Review of Systems   Constitutional:  No fever.    No weakness in the right arm  No numbness or tingling      Health Status   Allergies:    Allergic Reactions (Selected)  Severity Not Documented  Augmentin (Hives)  Doxycycline (Hives)  Zithromax (Nausea, diarrhea)   Medications:  (Selected)   Prescriptions  Prescribed  Advair Diskus 100 mcg-50 mcg inhalation powder: 1 puff(s), inh, bid, Instructions: fill when needed, # 60 EA, 11 Refill(s), Type: Maintenance, Pharmacy: [facility]/pharmacy #[id number], due for appointment now, 1 puff(s) Inhale bid,Instr:fill when needed  Cipro 500 mg oral tablet: = 1 tab(s) ( 500 mg ), PO, q12hr, # 20 tab(s), 1 Refill(s), Type: Maintenance, Pharmacy: [facility]/pharmacy #[id number], 1 tab(s) Oral q12 hrs,x10 day(s)  EpiPen 2-Wilver 0.3 mg injectable kit: ( 0.3 mg ), IM, Once, # 1 EA, 1 Refill(s), Type: Soft Stop, Pharmacy: [facility] #[id number], 0.3 mg im once  albuterol 90 mcg/inh inhalation aerosol: 2 puff(s), INH, 1-4x/day, Instructions: aerochamber with valve, PRN: for wheezing, # 17 g, 2  Refill(s), Type: Maintenance, Pharmacy: [facility] #[id number], 2 puff(s) inh 1-4x/day,PRN:for wheezing,Instr:aerochamber with valve  amitriptyline 10 mg oral tablet: = 2 tab(s) ( 20 mg ), po, hs, Instructions: fill when needed, # 190 tab(s), 3 Refill(s), Type: Maintenance, [id number], 2 tab(s) Oral hs,Instr:fill when needed  fluticasone 50 mcg/inh nasal spray: = 2 spray(s), nasal, daily, Instructions: fill when needed, # 3 EA, 4 Refill(s), Type: Maintenance, [id number], 2 spray(s) Nasal daily,Instr:fill when needed  hyoscyamine 0.125 mg oral tablet: = 1 tab(s) ( 0.125 mg ), PO, QID, Instructions: fill when needed, PRN: for loose stool, # 10 tab(s), 3 Refill(s), Type: Maintenance, [id number], 1 tab(s) Oral qid,PRN:for loose stool,Instr:fill when needed  metroNIDAZOLE 500 mg oral tablet: = 1 tab(s) ( 500 mg ), PO, q8hr, # 30 tab(s), 1 Refill(s), Type: Maintenance, [id number], 1 tab(s) Oral q8 hrs,x10 day(s)  pantoprazole 40 mg oral delayed release tablet: = 1 tab(s), Oral, daily, Instructions: fill when needed, # 95 tab(s), 3 Refill(s), RAVEN, Type: Maintenance, Pharmacy: [facility] #[id number], Pt due for clinic visit., 1 tab(s) Oral daily,Instr:fill when needed   Problem list:    All Problems  Allergic asthma / SNOMED CT 3939036965 / Confirmed  Cholecystitis / SNOMED CT 062006043 / Confirmed  Dyslipidemia / SNOMED CT 3361617681 / Confirmed  GERD (gastroesophageal reflux disease) / SNOMED CT 200991892 / Confirmed  History of adenomatous polyp of colon / SNOMED CT 8909567345 / Confirmed  IBS (irritable bowel syndrome) / SNOMED CT 97207700 / Confirmed  Obese / SNOMED CT 7459312445 / Probable  Osteopenia / SNOMED CT 685017573 / Confirmed  Prediabetes / SNOMED CT 9165125016 / Confirmed  Resolved: Chicken pox / SNOMED CT 29547978  Canceled: Tubulovillous adenoma / SNOMED CT 80123366      Physical Examination   Vital Signs   2/20/2020 9:25 AM CST  Temperature Tympanic 97.4 DegF  LOW    Peripheral Pulse Rate 74 bpm    Pulse Site Radial artery    HR Method Manual    Systolic Blood Pressure 138 mmHg  HI    Diastolic Blood Pressure 78 mmHg    Mean Arterial Pressure 98 mmHg    BP Site Right arm    BP Method Manual      Measurements from flowsheet : Measurements   2/20/2020 9:25 AM CST Height Measured - Standard 60.5 in    Ht/Wt Measurement Refused by Patient? Yes      General:  Alert and oriented, No acute distress.    Musculoskeletal:  Normal range of motion, Normal strength, Normal gait.    Skin: no bruising or swelling  Normal strength in rotator cuff muscles. Tender right lateral clavicle      Review / Management   Right clavicle x-ray: no fractures. Images reviewed with patient      Impression and Plan   Diagnosis     Pain of right clavicle ([id number]).       Likely contusion: Recommend shoulder strengthening exercises with naprosyn for 10 days. Follow up if not improving.

## 2022-02-15 NOTE — TELEPHONE ENCOUNTER
---------------------  From: Jack Mcfadden MD   To: MARÍA ELENA ESPARZA    Sent: 2/3/2021 5:23:48 PM CST  Subject: Bone density scan     Dear María Elena    Your bone density results indicate:    _   Osteopenia in hip (mild bone thinning   not osteoporosis)    Our recommendation is:    _   Calcium    Recommended daily amounts for men and women are:  o Men age 50 - 69  1000 mg  o Men age 70+  1200 mg  o Women age 50+  1200 mg  Vitamin D    800 - 1000 IU daily  Weight bearing Exercise    30 minutes or more several times a week  Avoid excess alcohol and smoking  Prevent falling    Avoid excessive sedation or hypotension (low blood pressure)    Improve strength    Decrease or remove environmental hazards    _   Repeat bone density in 5 year(s)    Gerald Mcfadden M.D.

## 2022-02-15 NOTE — TELEPHONE ENCOUNTER
---------------------  From: Kathleen Gill LPN   Sent: 1/30/2019 9:44:49 AM CST  Subject: General Message     PCP:   Temitope CALDERON    Time of Call:  _ 0915       Person Calling:  _ pt  Phone number:  _ [phone]    Returned call at: _0930    Note:   _ Pt LM stating that they were in need of a refill for her advair inhaler as she only has 2 days left.     sent in 1 month supply to [facility] in  and called to inform pt at 0930, pt has appointment 2-4-2019 and will get further fill then.    Last office visit and reason:  _ 1- chest pain

## 2022-02-15 NOTE — NURSING NOTE
Comprehensive Intake Entered On:  4/26/2021 2:32 PM CDT    Performed On:  4/26/2021 2:27 PM CDT by Mahamed Sampson LPN               Summary   Chief Complaint :   SHOULDER PAIN - Has gotten worse since last here.   Advance Directive :   No   Menstrual Status :   Hysterectomy   Weight Measured :   253 lb(Converted to: 253 lb 0 oz, 114.759 kg)    Height Measured :   61 in(Converted to: 5 ft 1 in, 154.94 cm)    Body Mass Index :   47.8 kg/m2 (HI)    Body Surface Area :   2.22 m2   Systolic Blood Pressure :   112 mmHg   Diastolic Blood Pressure :   72 mmHg   Mean Arterial Pressure :   85 mmHg   Peripheral Pulse Rate :   82 bpm   BP Site :   Right arm   Pulse Site :   Radial artery   BP Method :   Manual   HR Method :   Manual   Race :      Languages :   English   Ethnicity :   Not  or    Mahamed Sampson LPN - 4/26/2021 2:27 PM CDT   Consents   Consent for Immunization Exchange :   Consent Granted   Consent for Immunizations to Providers :   Consent Granted   Mahamed Sampson LPN - 4/26/2021 2:27 PM CDT   Problems   (As Of: 4/26/2021 2:32:43 PM CDT)   Problems(Active)    Allergic asthma (SNOMED CT  :2769148606 )  Name of Problem:   Allergic asthma ; Recorder:   Belen Sawant CMA; Confirmation:   Confirmed ; Classification:   Medical ; Code:   7851748121 ; Contributor System:   Ping Communication ; Last Updated:   1/24/2018 11:02 AM CST ; Life Cycle Date:   2/9/2010 ; Life Cycle Status:   Active ; Vocabulary:   SNOMED CT   ; Comments:        2/9/2010 11:48 AM - Belen Sawant CMA  fall/spring- mild persistant      Cholecystitis (SNOMED CT  :446709782 )  Name of Problem:   Cholecystitis ; Recorder:   Belen Sawant CMA; Confirmation:   Confirmed ; Classification:   Medical ; Code:   416957421 ; Contributor System:   PowerChart ; Last Updated:   1/24/2018 11:02 AM CST ; Life Cycle Date:   2/9/2010 ; Life Cycle Status:   Active ; Vocabulary:   SNOMED CT        Dyslipidemia (SNOMED  CT  :1644192126 )  Name of Problem:   Dyslipidemia ; Recorder:   Belen Sawant CMA; Confirmation:   Confirmed ; Classification:   Medical ; Code:   2790284622 ; Contributor System:   PowerChart ; Last Updated:   1/24/2018 11:02 AM CST ; Life Cycle Date:   2/9/2010 ; Life Cycle Status:   Active ; Vocabulary:   SNOMED CT        GERD (gastroesophageal reflux disease) (SNOMED CT  :576255959 )  Name of Problem:   GERD (gastroesophageal reflux disease) ; Recorder:   Belen Sawant CMA; Confirmation:   Confirmed ; Classification:   Medical ; Code:   535354481 ; Contributor System:   PowerChart ; Last Updated:   1/24/2018 11:02 AM CST ; Life Cycle Date:   2/9/2010 ; Life Cycle Status:   Active ; Vocabulary:   SNOMED CT        History of adenomatous polyp of colon (SNOMED CT  :3831449904 )  Name of Problem:   History of adenomatous polyp of colon ; Recorder:   Kaitlin Amaro; Confirmation:   Confirmed ; Classification:   Medical ; Code:   5288635035 ; Contributor System:   PowerChart ; Last Updated:   10/16/2017 12:04 PM CDT ; Life Cycle Status:   Active ; Vocabulary:   SNOMED CT        IBS (irritable bowel syndrome) (SNOMED CT  :73032604 )  Name of Problem:   IBS (irritable bowel syndrome) ; Recorder:   Belen Sawant CMA; Confirmation:   Confirmed ; Classification:   Medical ; Code:   31883874 ; Contributor System:   PowerChart ; Last Updated:   1/24/2018 11:02 AM CST ; Life Cycle Date:   2/9/2010 ; Life Cycle Status:   Active ; Vocabulary:   SNOMED CT        Obese (SNOMED CT  :2789813614 )  Name of Problem:   Obese ; Recorder:   SYSTEM; Confirmation:   Probable ; Classification:   Medical ; Code:   6768173993 ; Contributor System:   PowerChart ; Last Updated:   1/24/2018 11:02 AM CST ; Life Cycle Date:   2/23/2011 ; Life Cycle Status:   Active ; Vocabulary:   SNOMED CT        Osteopenia (SNOMED CT  :542374976 )  Name of Problem:   Osteopenia ; Recorder:   Belen Sawant CMA; Confirmation:    Confirmed ; Classification:   Medical ; Code:   684921615 ; Contributor System:   PowerChart ; Last Updated:   1/24/2018 11:02 AM CST ; Life Cycle Date:   2/9/2010 ; Life Cycle Status:   Active ; Vocabulary:   SNOMED CT        Prediabetes (SNOMED CT  :1865750307 )  Name of Problem:   Prediabetes ; Onset Date:   3/11/2013 ; Recorder:   Jack Mcfadden MD; Confirmation:   Confirmed ; Classification:   Medical ; Code:   3473421907 ; Contributor System:   PowerChart ; Last Updated:   1/24/2018 11:02 AM CST ; Life Cycle Date:   3/12/2013 ; Life Cycle Status:   Active ; Responsible Provider:   Jack Mcfadden MD; Vocabulary:   SNOMED CT          Meds / Allergies   (As Of: 4/26/2021 2:32:43 PM CDT)   Allergies (Active)   Augmentin  Estimated Onset Date:   Unspecified ; Reactions:   hives ; Created By:   Myla Tyler CMA; Reaction Status:   Active ; Category:   Drug ; Substance:   Augmentin ; Type:   Allergy ; Updated By:   Myla Tyler CMA; Reviewed Date:   4/26/2021 2:28 PM CDT      doxycycline  Estimated Onset Date:   <not entered> 8/1/1997 ; Reactions:   Hives ; Created By:   Belen Sawant CMA; Reaction Status:   Active ; Category:   Drug ; Substance:   doxycycline ; Type:   Allergy ; Updated By:   Belen Sawant CMA; Source:   Paper Chart ; Reviewed Date:   4/26/2021 2:28 PM CDT      Zithromax  Estimated Onset Date:   Unspecified ; Reactions:   nausea, diarrhea ; Created By:   Nicole Spicer CMA; Reaction Status:   Active ; Category:   Drug ; Substance:   Zithromax ; Type:   Allergy ; Updated By:   Nicole Spicer CMA; Source:   Patient ; Reviewed Date:   4/26/2021 2:28 PM CDT        Medication List   (As Of: 4/26/2021 2:32:43 PM CDT)   Prescription/Discharge Order    albuterol  :   albuterol ; Status:   Prescribed ; Ordered As Mnemonic:   albuterol 90 mcg/inh inhalation aerosol ; Simple Display Line:   2 puff(s), INH, 1-4x/day, aerochamber with valve, PRN: for wheezing, 17 g, 2 Refill(s) ;  Ordering Provider:   Jack Mcfadden MD; Catalog Code:   albuterol ; Order Dt/Tm:   1/28/2021 11:50:06 AM CST          amitriptyline  :   amitriptyline ; Status:   Prescribed ; Ordered As Mnemonic:   amitriptyline 10 mg oral tablet ; Simple Display Line:   20 mg, 2 tab(s), po, hs, fill when needed, 190 tab(s), 3 Refill(s) ; Ordering Provider:   Jack Mcfadden MD; Catalog Code:   amitriptyline ; Order Dt/Tm:   1/28/2021 11:49:21 AM CST          ciprofloxacin  :   ciprofloxacin ; Status:   Prescribed ; Ordered As Mnemonic:   Cipro 500 mg oral tablet ; Simple Display Line:   500 mg, 1 tab(s), PO, q12hr, for 10 day(s), 20 tab(s), 0 Refill(s) ; Ordering Provider:   Jack Mcfadden MD; Catalog Code:   ciprofloxacin ; Order Dt/Tm:   1/28/2021 11:48:49 AM CST          EPINEPHrine  :   EPINEPHrine ; Status:   Prescribed ; Ordered As Mnemonic:   EpiPen 2-Wilver 0.3 mg injectable kit ; Simple Display Line:   0.3 mg, IM, Once, 1 EA, 1 Refill(s) ; Ordering Provider:   Jack Mcfadden MD; Catalog Code:   EPINEPHrine ; Order Dt/Tm:   1/23/2018 4:18:12 PM CST          fluticasone-salmeterol  :   fluticasone-salmeterol ; Status:   Prescribed ; Ordered As Mnemonic:   Advair Diskus 100 mcg-50 mcg inhalation powder ; Simple Display Line:   1 puff(s), inh, bid, fill when needed, 60 EA, 11 Refill(s) ; Ordering Provider:   Jack Mcfadden MD; Catalog Code:   fluticasone-salmeterol ; Order Dt/Tm:   1/28/2021 11:49:53 AM CST          fluticasone nasal  :   fluticasone nasal ; Status:   Suspended ; Ordered As Mnemonic:   fluticasone 50 mcg/inh nasal spray ; Simple Display Line:   2 spray(s), nasal, daily, fill when needed, 3 EA, 4 Refill(s) ; Ordering Provider:   Jack Mcfadden MD; Catalog Code:   fluticasone nasal ; Order Dt/Tm:   3/10/2020 2:55:06 PM CDT          hyoscyamine  :   hyoscyamine ; Status:   Prescribed ; Ordered As Mnemonic:   hyoscyamine 0.125 mg oral tablet ; Simple Display Line:   0.125 mg, 1 tab(s), PO, QID, fill when  needed, PRN: for loose stool, 10 tab(s), 3 Refill(s) ; Ordering Provider:   Jakc Mcfadden MD; Catalog Code:   hyoscyamine ; Order Dt/Tm:   1/28/2021 11:48:43 AM CST          metroNIDAZOLE  :   metroNIDAZOLE ; Status:   Prescribed ; Ordered As Mnemonic:   metroNIDAZOLE 500 mg oral tablet ; Simple Display Line:   500 mg, 1 tab(s), PO, q8hr, for 10 day(s), 30 tab(s), 0 Refill(s) ; Ordering Provider:   Jack Mcfadden MD; Catalog Code:   metroNIDAZOLE ; Order Dt/Tm:   1/28/2021 11:48:57 AM CST          mometasone nasal  :   mometasone nasal ; Status:   Prescribed ; Ordered As Mnemonic:   Nasonex 50 mcg/inh nasal spray ; Simple Display Line:   2 spray(s), Nasal, daily, PRN: for allergy symptoms, 3 EA, 3 Refill(s) ; Ordering Provider:   Jack Mcfadden MD; Catalog Code:   mometasone nasal ; Order Dt/Tm:   1/28/2021 11:50:14 AM CST          pantoprazole  :   pantoprazole ; Status:   Prescribed ; Ordered As Mnemonic:   pantoprazole 40 mg oral delayed release tablet ; Simple Display Line:   1 tab(s), Oral, daily, fill when needed, 95 tab(s), 3 Refill(s) ; Ordering Provider:   Jack Mcfadden MD; Catalog Code:   pantoprazole ; Order Dt/Tm:   1/28/2021 11:49:06 AM CST            ID Risk Screen   Recent Travel History :   No recent travel   Family Member Travel History :   No recent travel   Other Exposure to Infectious Disease :   Unknown   COVID-19 Testing Status :   No positive COVID-19 test   Mahamed Sampson LPN - 4/26/2021 2:27 PM CDT   Social History   Social History   (As Of: 4/26/2021 2:32:43 PM CDT)   Alcohol:        Quit in 1975   (Last Updated: 1/29/2021 10:27:51 AM CST by Peggy Rose)          Tobacco:        Never   (Last Updated: 6/25/2014 4:27:30 PM CDT by Myla Tyler CMA)   Never (less than 100 in lifetime)   (Last Updated: 1/28/2021 11:03:00 AM CST by Sandra Felix CMA)          Electronic Cigarette/Vaping:        Electronic Cigarette Use: Never.   (Last Updated: 1/28/2021 11:03:03 AM CST by Isidro  Sandra MURPHY)          Substance Abuse:        Never   (Last Updated: 6/25/2014 4:27:45 PM CDT by Myla Tyler CMA)          Employment/School:        Retired, Work/School description: Ag teacher, [employer]..   (Last Updated: 1/29/2021 10:29:33 AM CST by Peggy Rose)          Home/Environment:        Marital status: Single.  Living situation: Home/Independent.  Injuries/Abuse/Neglect in household: No.  Feels unsafe at home: No.  Family/Friends available for support: Yes.   (Last Updated: 1/29/2021 10:29:24 AM CST by Peggy Rose)          Nutrition/Health:        Diet: Low fat.  Sleeping concerns: No.  Feels highly stressed: No.   (Last Updated: 1/29/2021 10:28:30 AM CST by Peggy Rose)          Exercise:  Occasional exercise      Exercise frequency: 3-4 times/week.  Exercise type: Bicycling, Walking, Weight lifting.   (Last Updated: 1/29/2021 10:28:15 AM CST by Peggy Rose)          Sexual:        Sexually active: No.   Comments:  7/30/2010 9:33 AM - Jessica Vu: .   (Last Updated: 7/30/2010 9:33:28 AM CDT by Jessica Vu)

## 2022-02-15 NOTE — NURSING NOTE
"Comprehensive Intake Entered On:  8/14/2021 11:10 AM CDT    Performed On:  8/14/2021 11:03 AM CDT by Sandra Felix CMA               Summary   Chief Complaint :   c/o tongue swelling with a \" prickly sensation\" some difficulty swallowing since last night, currently on antibiotic for cat bite   Advance Directive :   No   Menstrual Status :   Hysterectomy   Height Measured :   61 in(Converted to: 5 ft 1 in, 154.94 cm)    Systolic Blood Pressure :   132 mmHg (HI)    Diastolic Blood Pressure :   82 mmHg (HI)    Mean Arterial Pressure :   99 mmHg   Peripheral Pulse Rate :   92 bpm   BP Site :   Right arm   Pulse Site :   Radial artery   BP Method :   Manual   HR Method :   Electronic   Temperature Tympanic :   98 DegF(Converted to: 36.7 DegC)    Oxygen Saturation :   99 %   Race :      Languages :   English   Ethnicity :   Not  or    Sandra Felix CMA - 8/14/2021 11:03 AM CDT   Health Status   Allergies Verified? :   Yes   Medication History Verified? :   Yes   Medical History Verified? :   No   Pre-Visit Planning Status :   Not completed   Tobacco Use? :   Never smoker   Sandra Felix CMA - 8/14/2021 11:03 AM CDT   Consents   Consent for Immunization Exchange :   Consent Granted   Consent for Immunizations to Providers :   Consent Granted   Sandra Felix CMA - 8/14/2021 11:03 AM CDT   Meds / Allergies   (As Of: 8/14/2021 11:10:36 AM CDT)   Allergies (Active)   Augmentin  Estimated Onset Date:   Unspecified ; Reactions:   hives ; Created By:   Myla Tyler CMA; Reaction Status:   Active ; Category:   Drug ; Substance:   Augmentin ; Type:   Allergy ; Updated By:   Myla Tyler CMA; Reviewed Date:   8/14/2021 11:07 AM CDT      doxycycline  Estimated Onset Date:   <not entered> 8/1/1997 ; Reactions:   Hives ; Created By:   Belen Adams; Reaction Status:   Active ; Category:   Drug ; Substance:   doxycycline ; Type:   Allergy ; Updated By:   Belen Adams; Source:   Paper " Chart ; Reviewed Date:   8/14/2021 11:07 AM CDT      Zithromax  Estimated Onset Date:   Unspecified ; Reactions:   nausea, diarrhea ; Created By:   Nicole Spicer CMA; Reaction Status:   Active ; Category:   Drug ; Substance:   Zithromax ; Type:   Allergy ; Updated By:   Nicole Spicer CMA; Source:   Patient ; Reviewed Date:   8/14/2021 11:07 AM CDT        Medication List   (As Of: 8/14/2021 11:10:36 AM CDT)   Prescription/Discharge Order    albuterol  :   albuterol ; Status:   Prescribed ; Ordered As Mnemonic:   albuterol 90 mcg/inh inhalation aerosol ; Simple Display Line:   2 puff(s), INH, 1-4x/day, aerochamber with valve, PRN: for wheezing, 17 g, 2 Refill(s) ; Ordering Provider:   Jack Mcfadden MD; Catalog Code:   albuterol ; Order Dt/Tm:   1/28/2021 11:50:06 AM CST          amitriptyline  :   amitriptyline ; Status:   Prescribed ; Ordered As Mnemonic:   amitriptyline 10 mg oral tablet ; Simple Display Line:   20 mg, 2 tab(s), po, hs, fill when needed, 190 tab(s), 3 Refill(s) ; Ordering Provider:   Jack Mcfadden MD; Catalog Code:   amitriptyline ; Order Dt/Tm:   1/28/2021 11:49:21 AM CST          EPINEPHrine  :   EPINEPHrine ; Status:   Prescribed ; Ordered As Mnemonic:   EpiPen 2-Wilver 0.3 mg injectable kit ; Simple Display Line:   0.3 mg, IM, Once, 1 EA, 1 Refill(s) ; Ordering Provider:   Jack Mcfadden MD; Catalog Code:   EPINEPHrine ; Order Dt/Tm:   1/23/2018 4:18:12 PM CST          fluticasone-salmeterol  :   fluticasone-salmeterol ; Status:   Prescribed ; Ordered As Mnemonic:   Advair Diskus 100 mcg-50 mcg inhalation powder ; Simple Display Line:   1 puff(s), inh, bid, fill when needed, 60 EA, 11 Refill(s) ; Ordering Provider:   Jack Mcfadden MD; Catalog Code:   fluticasone-salmeterol ; Order Dt/Tm:   1/28/2021 11:49:53 AM CST          fluticasone nasal  :   fluticasone nasal ; Status:   Prescribed ; Ordered As Mnemonic:   fluticasone 50 mcg/inh nasal spray ; Simple Display Line:   2 spray(s),  nasal, daily, fill when needed, 3 EA, 4 Refill(s) ; Ordering Provider:   Silvia Howell PA-C; Catalog Code:   fluticasone nasal ; Order Dt/Tm:   3/10/2020 2:55:06 PM CDT          hyoscyamine  :   hyoscyamine ; Status:   Prescribed ; Ordered As Mnemonic:   hyoscyamine 0.125 mg oral tablet ; Simple Display Line:   0.125 mg, 1 tab(s), PO, QID, fill when needed, PRN: for loose stool, 10 tab(s), 3 Refill(s) ; Ordering Provider:   Jack Mcfadden MD; Catalog Code:   hyoscyamine ; Order Dt/Tm:   1/28/2021 11:48:43 AM CST          metroNIDAZOLE  :   metroNIDAZOLE ; Status:   Prescribed ; Ordered As Mnemonic:   metroNIDAZOLE 500 mg oral tablet ; Simple Display Line:   500 mg, 1 tab(s), Oral, tid, for 10 day(s), 30 tab(s), 0 Refill(s) ; Ordering Provider:   Silvia Howell PA-C; Catalog Code:   metroNIDAZOLE ; Order Dt/Tm:   8/6/2021 3:38:04 PM CDT          mometasone nasal  :   mometasone nasal ; Status:   Prescribed ; Ordered As Mnemonic:   Nasonex 50 mcg/inh nasal spray ; Simple Display Line:   2 spray(s), Nasal, daily, PRN: for allergy symptoms, 3 EA, 3 Refill(s) ; Ordering Provider:   Jack Mcfadden MD; Catalog Code:   mometasone nasal ; Order Dt/Tm:   1/28/2021 11:50:14 AM CST          pantoprazole  :   pantoprazole ; Status:   Prescribed ; Ordered As Mnemonic:   pantoprazole 40 mg oral delayed release tablet ; Simple Display Line:   1 tab(s), Oral, daily, fill when needed, 95 tab(s), 3 Refill(s) ; Ordering Provider:   Jack Mcfadden MD; Catalog Code:   pantoprazole ; Order Dt/Tm:   1/28/2021 11:49:06 AM CST          sulfamethoxazole-trimethoprim  :   sulfamethoxazole-trimethoprim ; Status:   Prescribed ; Ordered As Mnemonic:   Bactrim  mg-160 mg oral tablet ; Simple Display Line:   1 tab(s), Oral, bid, for 10 day(s), 20 tab(s), 0 Refill(s) ; Ordering Provider:   Silvia Howell PA-C; Catalog Code:   sulfamethoxazole-trimethoprim ; Order Dt/Tm:   8/6/2021 3:40:37 PM CDT            Social History    Social History   (As Of: 8/14/2021 11:10:36 AM CDT)   Alcohol:        Quit in 1975   (Last Updated: 1/29/2021 10:27:51 AM CST by Peggy Rose)          Tobacco:        Never   (Last Updated: 6/25/2014 4:27:30 PM CDT by Myla Tyler CMA)   Never (less than 100 in lifetime)   (Last Updated: 1/28/2021 11:03:00 AM CST by Sandra Felix CMA)          Electronic Cigarette/Vaping:        Electronic Cigarette Use: Never.   (Last Updated: 1/28/2021 11:03:03 AM CST by Sandra Felix CMA)          Substance Abuse:        Never   (Last Updated: 6/25/2014 4:27:45 PM CDT by Myla Tyler CMA)          Employment/School:        Retired, Work/School description: Ag teacher, [employer]..   (Last Updated: 1/29/2021 10:29:33 AM CST by Peggy Rose)          Home/Environment:        Marital status: Single.  Living situation: Home/Independent.  Injuries/Abuse/Neglect in household: No.  Feels unsafe at home: No.  Family/Friends available for support: Yes.   (Last Updated: 1/29/2021 10:29:24 AM CST by Peggy Rose)          Nutrition/Health:        Diet: Low fat.  Sleeping concerns: No.  Feels highly stressed: No.   (Last Updated: 1/29/2021 10:28:30 AM CST by Peggy Rose)          Exercise:  Occasional exercise      Exercise frequency: 3-4 times/week.  Exercise type: Bicycling, Walking, Weight lifting.   (Last Updated: 1/29/2021 10:28:15 AM CST by Peggy Rose)          Sexual:        Sexually active: No.   Comments:  7/30/2010 9:33 AM - Jessica Vu: .   (Last Updated: 7/30/2010 9:33:28 AM CDT by Jessica Vu)

## 2022-02-15 NOTE — RESULTS
-------------------------------- Original Report -------------------------------    EXAM:  MRI EXAMINATION OF THE RIGHT SHOULDER    CLINICAL INFORMATION:  Right shoulder pain ongoing for 3 weeks.  Pain with  movement.  Status post fall with injury.  No history of surgery to this area.    TECHNICAL INFORMATION: Axial PD and PD fat saturation.  Coronal PD and T2 fat  saturation.  Sagittal T2 and PD fat saturation images were acquired.    There are no prior studies available for comparison.    INTERPRETATION:    Bones:  There is no Hill-Sachs impaction deformity.  No other occult fracture or  osseous contusion.      No other bone marrow edema pattern.    Rotator Cuff:  There is an abnormal appearance as seen on series 7 image 20 as  well as series 4 image 13 of a 0.6 cm AP by 1.1 cm mediolateral intrasubstance  partial-thickness tear anterior to the midportion of the supraspinatus tendon  insertional footprint.  This appears to maximally involve one half of the tendon  fiber thickness.  Moderate surrounding changes of tendinopathy.      There is a mild to moderate appearance of adjacent infraspinatus tendinopathy.   The teres minor tendon is intact.      The subscapularis tendon is intact.      No appreciable rotator cuff muscle belly atrophy.     Coracoacromial arch:  There is no discrete subacromial osseous spur.  The bony  acromiohumeral interval is measuring 8 mm.  There is no abnormal underlying  thickening identified of the coracoacromial ligament.    Acromioclavicular joint:  There is a marked appearance of AC joint DJD.  No  deformity of the underlying supraspinatus tendon.      Marked abnormal fluid signal within the subacromial/subdeltoid bursa areas.     Biceps tendon:  The long head biceps tendon is intact and nondisplaced from the  bicipital groove.  There are moderate changes of intra-articular tendinopathy.     Glenohumeral joint and labrum:  There is a small glenohumeral joint effusion.   Synovitis  and/or blood products are identified within the joint.  As seen on  series 4 images 16 and 17, suspicion for loose body tissue within the  subscapularis recess area of the joint.      Series 4 images 7 and 8 as well as series 6 image 12 demonstrate a 1.1 x 0.5 cm  segment of grade IV chondromalacia/chondral defect overlying the superior  posterior medial aspect of the humeral head.      There is tearing identified through the superior aspect of the labrum.  No  other definite evidence for labral tear.  No discrete paralabral cyst is  identified.     CONCLUSION:    1.  There is a small to moderate-sized intrasubstance partial-thickness tear  anterior to the mid supraspinatus tendon insertion.  This maximally involves one  half of the tendon fiber thickness.  Moderate surrounding tendinopathy.  2.  There is a small to moderate-sized segment of grade IV  chondromalacia/full-thickness chondral defect overlying the superior posterior  medial humeral head.  3.  There is a small glenohumeral joint effusion with synovitis/or blood  products.  Loose body tissue appears likely present within the subscapularis  recess area of the joint.  4.  Marked AC joint DJD.  Marked subacromial/subdeltoid bursitis.  5.  Moderate intra-articular long head biceps tendinopathy.    KES      Read by: Gregory Palafox M.D.  Reviewed and Electronically Signed by: Gregory Palafox M.D.

## 2022-02-15 NOTE — PROGRESS NOTES
Patient:   ANDRADE ESPARZA            MRN: [id number]            FIN: [id number]               Age:   66 years     Sex:  Female     :  1955   Associated Diagnoses:   Allergic asthma; Allergic rhinitis   Author:   Jack Mcfadden MD      Visit Information      Date of Service: 10/20/2021 10:36 am  Performing Location: [facility]  Encounter#: [id number]      Primary Care Provider (PCP):  Jack Mcfadden MD    NPI# [id number]      Referring Provider:  Jack Mcfadden MD    NPI# [id number]      Chief Complaint   10/20/2021 10:42 AM CDT  Discuss ongoing allergies and arthritis      History of Present Illness   Having a lot of congestion. Using a lot of kleenex. On bad days has a sore throat, itching eyes and ears, earaches with allergies. Using flonase and allegra. Flonase works better than nasonex. Using Advair twice daily (used to use once daily). Had allergy shots in middle school.  Does not like NETI pot      Review of Systems   Constitutional:  No fever.    Gastrointestinal:  No vomiting.    Has some more aches right second MCP, left 3rd MCP, lower back and right big toe (no significant swelling)      Health Status   Allergies:    Allergic Reactions (Selected)  Severity Not Documented  Augmentin (Hives)  Doxycycline (Hives)  MetroNIDAZOLE (No reactions were documented)  Seasonal/Environmental Allergies (No reactions were documented)  Sulfa drug (No reactions were documented)  Zithromax (Nausea, diarrhea)   Medications:  (Selected)   Prescriptions  Prescribed  Advair Diskus 100 mcg-50 mcg inhalation powder: 1 puff(s), inh, bid, Instructions: fill when needed, # 60 EA, 11 Refill(s), Type: Maintenance, Pharmacy: [id number], due for appointment now, 1 puff(s) Inhale bid,Instr:fill when needed, 61, in, 21 11:02:00 CST, Height Measured, 253.4...  EpiPen 2-Wilver 0.3 mg injectable kit: ( 0.3 mg ), IM, Once, # 1 EA, 1 Refill(s), Type: Soft Stop, Pharmacy: [id number], 0.3 mg im  once  Nasonex 50 mcg/inh nasal spray: 2 spray(s), Nasal, daily, PRN: for allergy symptoms, # 3 EA, 3 Refill(s), Type: Maintenance, Pharmacy: [place] [id number], 2 spray(s) Nasal daily,PRN:for allergy symptoms, 61, in, 01/28/21 11:02:00 CST, Height Measured, 253.4, lb, 01/28/21 11:02:00...  albuterol 90 mcg/inh inhalation aerosol: 2 puff(s), INH, 1-4x/day, Instructions: aerochamber with valve, PRN: for wheezing, # 17 g, 2 Refill(s), Type: Maintenance, Pharmacy: [place] [id number], 2 puff(s) Inhale 1-4x/day,PRN:for wheezing,Instr:aerochamber with valve, 61, in, 01/28/21 11:02...  amitriptyline 10 mg oral tablet: = 2 tab(s) ( 20 mg ), po, hs, Instructions: fill when needed, # 190 tab(s), 3 Refill(s), Type: Maintenance, Pharmacy: [facility], 2 tab(s) Oral hs,Instr:fill when needed, 61, in, 01/28/21 11:02:00 CST, Height Measured, 253.4, lb, 01/28/21 1...  fluticasone 50 mcg/inh nasal spray: = 2 spray(s), nasal, daily, Instructions: fill when needed, # 3 EA, 4 Refill(s), Type: Maintenance, Pharmacy: [email or web address], 2 spray(s) Nasal daily,Instr:fill when needed  hyoscyamine 0.125 mg oral tablet: = 1 tab(s) ( 0.125 mg ), PO, QID, Instructions: fill when needed, PRN: for loose stool, # 10 tab(s), 3 Refill(s), Type: Maintenance, Pharmacy: [place] [id number], 1 tab(s) Oral qid,PRN:for loose stool,Instr:fill when needed, 61, in, 01/28/21 11:02:...  pantoprazole 40 mg oral delayed release tablet: = 1 tab(s), Oral, daily, Instructions: fill when needed, # 95 tab(s), 3 Refill(s), RAVEN, Type: Maintenance, Pharmacy: [id number], Pt due for clinic visit., 1 tab(s) Oral daily,Instr:fill when needed, 61, in, 01/28/21 11:02:00 CST, Height Susana...   Problem list:    All Problems  Allergic asthma / SNOMED CT 2698111273 / Confirmed  Cholecystitis / SNOMED CT 415452165 / Confirmed  Dyslipidemia / SNOMED CT 4529536498 / Confirmed  GERD (gastroesophageal reflux disease) / SNOMED CT 748925352 /  Confirmed  History of adenomatous polyp of colon / SNOMED CT 2883612225 / Confirmed  IBS (irritable bowel syndrome) / SNOMED CT 52881011 / Confirmed  Obese / SNOMED CT 6155872240 / Probable  Osteopenia / SNOMED CT 573663697 / Confirmed  Prediabetes / SNOMED CT 5652337169 / Confirmed  Resolved: Chicken pox / SNOMED CT 12193263  Canceled: Tubulovillous adenoma / SNOMED CT 63076196      Histories   Procedure history:    DEXA - Dual energy X-ray photon absorptiometry (SNOMED CT 323835840) on 3/25/2016 at 60 Years.  Comments:  3/25/2016 8:07 AM CDT - Chris RIVERA-CAlpa  Osteopenia stable  Colonoscopy (SNOMED CT 887586440) performed by Jack Mcfadden MD on 8/21/2014 at 59 Years.  Comments:  8/28/2014 5:52 PM CDT - yMla Tyler CMA  Polyps x2 - random bx    8/28/2014 5:49 PM CDT - Myla Tyler CMA  Indication: screening  Sedation: MAC  Tubulovillous adenoma x2  Repeat in 3 years  DEXA on 8/2/2010 at 55 Years.  Comments:  8/5/2010 10:25 AM JAKT - Rashawn RIVERA-CJessica  Osteopenia, slightly worse. Repeat 3-5 yrs.  Left Bunionectomy in the month of 12/2008 at 53 Years.  Coronary angiogram (SNOMED CT 84887492) in the month of 8/2007 at 52 Years.  Cardiovascular stress testing (SNOMED CT 056592207) in the month of 3/2007 at 51 Years.  Colonoscopy normal-recheck 2014 (SNOMED CT 308317082) performed by Jack Mcfadden MD in the month of 8/2004 at 49 Years.  Comments:  7/30/2010 9:29 AM CDT - Rashawn RIVERA-C, Jessica  Normal. Repeat 10 yrs. Due 2014.  Arthroscopy of left shoulder in the month of 9/2003 at 48 Years.  Cardiovascular stress testing (SNOMED CT 749977873) on 7/10/2003 at 47 Years.  Esophagogastroduodenoscopy (SNOMED CT 882835005) in the month of 7/2003 at 47 Years.  LAVH - Laparoscopy assisted vaginal hysterectomy with BSO (SNOMED CT 6778976856) performed by Gregory Garcia MD in the month of 2/2003 at 47 Years.  Cholecystectomy (SNOMED CT 19439105) in 2000 at 45 Years.  laparoscopy-removal uterine fibroid on  1997 at 42 Years.  right ovarian cystectomy.  Colonoscopy (SNOMED CT 508683749) performed by Jack Mcfadden MD.  Comments:  2017 8:22 AM TREY - Meghan Mcdaniels  Sedation:  Fentanyl and Versed  Polyps:  tubular adenoma  Diverticuli: sigmoid  F/u in 5 years  Tonsillectomy (SNOMED CT 264385622).       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 Mom had lived with her and  . Has no immediate family. Nonsmoker  Family History: Father  heart attack. Mom  age 83. Brother  MVA        Physical Examination   Vital Signs   10/20/2021 10:42 AM CDT Temperature Tympanic 98.1 DegF    Peripheral Pulse Rate 80 bpm    Pulse Site Radial artery    HR Method Electronic    Systolic Blood Pressure 128 mmHg    Diastolic Blood Pressure 76 mmHg    Mean Arterial Pressure 93 mmHg    BP Site Right arm    BP Method Manual    Oxygen Saturation 99 %      Measurements from flowsheet : Measurements   10/20/2021 10:42 AM CDT Ht/Wt Measurement Refused by Patient? Yes    Height Measured - Standard 61 in      General:  Alert and oriented, No acute distress.    HENT:  nares patent.    Respiratory:  Lungs are clear to auscultation.    Cardiovascular:  Normal rate, Regular rhythm.    Musculoskeletal:  Normal gait.    Psychiatric:  Appropriate mood & affect.    no swollen joints      Impression and Plan   Diagnosis     Allergic asthma (INO73-IC J45.909).     Allergic rhinitis (EXG72-RB J30.9).       Allergic asthma: Increase advair to 250/50 twice daily  Allergic rhinitis: Switch from allegra to zyrtec or loratadine and add singulair. Refer to Allergist    Arthritis: continue to monitor

## 2022-02-15 NOTE — PROGRESS NOTES
Patient:   ANDRADE ESPARZA            MRN: [id number]            FIN: [id number]               Age:   63 years     Sex:  Female     :  1955   Associated Diagnoses:   Sigmoid diverticulitis   Author:   Calixto Pacheco MD      Chief Complaint   2019 12:09 PM CDT    Left lower abdominal pain.  Started last night.      History of Present Illness   patient with 24 hours of LLQ abdominal pain  intermittent waves of severe pain, pain is crampy at baseline but will have episodes of sharp and stabbing  diarrhea yesterday  urination has been more difficult  no blood in stool or urine  no known fevers  feels achy and fatigued         Review of Systems   Constitutional:  Chills, Fatigue, Decreased activity.    Ear/Nose/Mouth/Throat:  Negative.    Respiratory:  Negative.    Cardiovascular:  Negative.    Gastrointestinal:  Negative except as documented in history of present illness.    Genitourinary:  Negative except as documented in history of present illness.       Health Status   Allergies:    Allergic Reactions (All)  Severity Not Documented  Augmentin (Hives)  Doxycycline (Hives)  Zithromax (Nausea, diarrhea)   Medications:  (Selected)   Prescriptions  Prescribed  Advair Diskus 100 mcg-50 mcg inhalation powder: 1 puff(s), inh, bid, Instructions: fill when needed, # 60 EA, 11 Refill(s), Type: Maintenance, Pharmacy: [facility]/pharmacy #[id number], due for appointment now, 1 puff(s) Inhale bid,Instr:fill when needed  EpiPen 2-Wilver 0.3 mg injectable kit: ( 0.3 mg ), IM, Once, # 1 EA, 1 Refill(s), Type: Soft Stop, Pharmacy: [facility] #[id number], 0.3 mg im once  albuterol 90 mcg/inh inhalation aerosol: 2 puff(s), INH, 1-4x/day, Instructions: aerochamber with valve, PRN: for wheezing, # 17 g, 2 Refill(s), Type: Maintenance, Pharmacy: [facility] #[id number], 2 puff(s) inh 1-4x/day,PRN:for wheezing,Instr:aerochamber with valve  amitriptyline 10 mg oral tablet: = 2 tab(s) ( 20 mg ), po, hs, Instructions: fill when needed, # 190 tab(s), 3  Refill(s), Type: Maintenance, Pharmacy: [facility]/pharmacy #[id number], 2 tab(s) Oral hs,Instr:fill when needed  fluticasone 50 mcg/inh nasal spray: = 2 spray(s), nasal, daily, Instructions: fill when needed, # 3 EA, 4 Refill(s), Type: Maintenance, [id number], 2 spray(s) Nasal daily,Instr:fill when needed  hyoscyamine 0.125 mg oral tablet: = 1 tab(s) ( 0.125 mg ), PO, QID, Instructions: fill when needed, PRN: for loose stool, # 10 tab(s), 3 Refill(s), Type: Maintenance, [id number], 1 tab(s) Oral qid,PRN:for loose stool,Instr:fill when needed  pantoprazole 40 mg oral delayed release tablet: = 1 tab(s), Oral, daily, Instructions: fill when needed, # 95 tab(s), 3 Refill(s), RAVEN, Type: Maintenance, [id number], Pt due for clinic visit., 1 tab(s) Oral daily,Instr:fill when needed   Problem list:    All Problems (Selected)  Allergic asthma / SNOMED CT 5265203215 / Confirmed  Cholecystitis / SNOMED CT 178909097 / Confirmed  Dyslipidemia / SNOMED CT 5601388404 / Confirmed  GERD (gastroesophageal reflux disease) / SNOMED CT 811606124 / Confirmed  History of adenomatous polyp of colon / SNOMED CT 3019890463 / Confirmed  IBS (irritable bowel syndrome) / SNOMED CT 94704991 / Confirmed  Obese / SNOMED CT 8165073728 / Probable  Osteopenia / SNOMED CT 067247531 / Confirmed  Prediabetes / SNOMED CT 4259656079 / Confirmed      Histories   Past Medical History:    Active  Prediabetes (SNOMED CT 7523268977): Onset on 3/11/2013 at 57 years.  Cholecystitis (SNOMED CT 895974532)  GERD (gastroesophageal reflux disease) (SNOMED CT 138704965)  Allergic asthma (SNOMED CT 2379957970)  Comments:  2/9/2010 CST 11:48 AM CST - Belen Sawant CMA  fall/spring- mild persistant  Dyslipidemia (SNOMED CT 8912697966)  IBS (irritable bowel syndrome) (SNOMED CT 62555655)  Osteopenia (SNOMED CT 352885388)  Obese (SNOMED CT 0138538536)  History of adenomatous polyp of colon (SNOMED CT  3158740201)  Resolved  Chicken pox (SNOMED CT 63565488):  Resolved.   Family History:    MI  Father ()  Comments:  2011 11:49 AM JAKT - Bev Curtis  60s.  Diabetes mellitus  Father ()  Brain tumor  Uncle (M)  Alzheimer's disease  Father ()  Osteoarthritis  Mother ()     Procedure history:    DEXA - Dual energy X-ray photon absorptiometry (941686062) on 3/25/2016 at 60 Years.  Comments:  3/25/2016 8:07 AM TUTU - Alpa Washington  Osteopenia stable  Colonoscopy (220106613) on 2014 at 59 Years.  Comments:  2014 5:52 PM CDT Myla Wright CMA  Polyps x2 - random bx    2014 5:49 PM JAKT Myla Wright CMA  Indication: screening  Sedation: MAC  Tubulovillous adenoma x2  Repeat in 3 years  DEXA on 2010 at 55 Years.  Comments:  2010 10:25 AM TUTU - Jessica Vu  Osteopenia, slightly worse. Repeat 3-5 yrs.  Left Bunionectomy in the month of 2008 at 53 Years.  Coronary angiogram (18443438) in the month of 2007 at 52 Years.  Cardiovascular stress testing (888796940) in the month of 3/2007 at 51 Years.  Colonoscopy normal-recheck  (000947164) in the month of 2004 at 49 Years.  Comments:  2010 9:29 AM TUTU - Jessica Vu  Normal. Repeat 10 yrs. Due .  Arthroscopy of left shoulder in the month of 2003 at 48 Years.  Cardiovascular stress testing (715746148) on 7/10/2003 at 47 Years.  Esophagogastroduodenoscopy (690142577) in the month of 2003 at 47 Years.  LAVH - Laparoscopy assisted vaginal hysterectomy (9050853416) in the month of 2003 at 47 Years.  Cholecystectomy (68219454) in  at 45 Years.  laparoscopy-removal uterine fibroid on 1997 at 42 Years.  right ovarian cystectomy.  Colonoscopy (390433473).  Comments:  2017 8:22 AM TREY - Meghan Mcdaniels  Sedation:  Fentanyl and Versed  Polyps:  tubular adenoma  Diverticuli: sigmoid  F/u in 5 years      Physical Examination   Vital Signs   2019 12:09 PM  CDT Temperature Tympanic 98.6 DegF    Peripheral Pulse Rate 89 bpm    Systolic Blood Pressure 148 mmHg  HI    Diastolic Blood Pressure 82 mmHg  HI    Mean Arterial Pressure 104 mmHg    Oxygen Saturation 96 %      Measurements from flowsheet : Measurements   4/6/2019 12:09 PM CDT    Weight Measured - Standard                253.6 lb     General:  Alert and oriented, No acute distress.    Eye:  Pupils are equal, round and reactive to light, Normal conjunctiva.    HENT:  Normocephalic, Tympanic membranes are clear, Oral mucosa is moist, No pharyngeal erythema, No sinus tenderness.    Neck:  Supple, Non-tender, No lymphadenopathy.    Respiratory:  Lungs are clear to auscultation.    Cardiovascular:  Normal rate, Regular rhythm.    Gastrointestinal:  Soft, Non-distended, Normal bowel sounds, tender suprapubic and LLQ, no rebound, mild guarding.    Genitourinary:  No costovertebral angle tenderness.       Review / Management   Results review:  Lab results   4/6/2019 1:21 PM CDT Sodium Level 143 mmol/L    Potassium Level 3.9 mmol/L    Chloride Level 104 mmol/L    CO2 Level 26 mmol/L    AGAP 13    Glucose Level 100 mg/dL    BUN 16 mg/dL    Creatinine Level 0.80 mg/dL    BUN/Creat Ratio 20    eGFR  >60    eGFR Non-African American >60    Calcium Level 9.7 mg/dL    WBC 8.1    RBC 4.62    Hgb 13.4 g/dL    Hct 40.7 %    MCV 88 fL    MCH 29.0 pg    MCHC 32.9 g/dL    RDW 14.5 %    Platelet 185    MPV 10.2 fL    Lymphocytes 13.2 %    Abs Lymphocytes 1.1    Neutrophils 80.6 %    Abs Neutrophils 6.5    Monocytes 5.1 %    Abs Monocytes 0.4    Eosinophils 1.0 %    Abs Eosinophils 0.1    Basophils 0.1 %    Abs Basophils 0.0   4/6/2019 1:04 PM CDT UA pH 7.0    UA Specific Gravity 1.015    UA Glucose NEGATIVE    UA Bilirubin NEGATIVE    UA Ketones NEGATIVE    Urine Occult Blood NEGATIVE    UA Protein NEGATIVE    UA Nitrite NEGATIVE    UA Leukocyte Esterase NEGATIVE   .    Reviewed labs with patient. Notes pain is  worsening. Discussed next steps, will proceed with CT scan.    CT scan shows diverticulitis. Report reviewed with patient through entire conclusion.      Impression and Plan   Diagnosis     Sigmoid diverticulitis (YYQ87-RP K57.32).     Plan:  discussed inpatient vs outpatient treatment, will push fluids at home, bland diet, antibiotics as presdribed. Follow up if not getting better over next 2-3 days, sooner if worsening. Discussed symptoms that would lead to ER/hospital visit. Call with questions..    Orders     Orders (Selected)   Prescriptions  Prescribed  Cipro 500 mg oral tablet: = 1 tab(s) ( 500 mg ), PO, q12hr, # 20 tab(s), 0 Refill(s), Type: Maintenance, Pharmacy: [facility] #[id number], 1 tab(s) Oral q12 hrs,x10 day(s)  metroNIDAZOLE 500 mg oral tablet: = 1 tab(s) ( 500 mg ), PO, q8hr, # 30 tab(s), 0 Refill(s), Type: Maintenance, Pharmacy: [facility] #[id number], 1 tab(s) Oral q8 hrs,x10 day(s).

## 2022-02-15 NOTE — PROGRESS NOTES
Chief Complaint    c/o ongoing allergies- itchy eyes, nasal congestion, post nasal drainage, scratchy throat  History of Present Illness      Has been taking Flonase and not working as well. Usually has a break from allergies in the summer but has been outside more often. Nasal drainage is the most common symptom. The watery and itchy eyes are increasing. Has been using Flonase for several years. Has not used nasonex. Has used Allegra in past.  Review of Systems      No fevers      No shortness of breath  Physical Exam   Vitals & Measurements    T: 98.4  F (Tympanic)  HR: 80 (Peripheral)  BP: 128/86     HT: 61 in       General: No acute distress      Musculoskeletal: Normal gait  Assessment/Plan      Allergic rhinitis: We will switch from Flonase to Nasonex.  May also try either Claritin, Zyrtec or Allegra.  Discussed Naphcon-A.  Follow-up if not improving.  Patient Information     Name:ANDRADE ESPARZA      Address:      [address] [place] [phone]     Sex:Female     YOB: 1955     [phone]     Emergency Contact:TOMASA HANSON     MRN:[id number]     FIN:[id number]     Location:[facility]     Date of Service:08/31/2020      Primary Care Physician:       Jack Mcfadden MD, [phone]      Attending Physician:       Jack Mcfadden MD, [phone]  Problem List/Past Medical History    Ongoing     Allergic asthma       Comments: fall/spring- mild persistant     Cholecystitis     Dyslipidemia     GERD (gastroesophageal reflux disease)     History of adenomatous polyp of colon     IBS (irritable bowel syndrome)     Obese     Osteopenia     Prediabetes    Historical     Chicken pox  Medications    Advair Diskus 100 mcg-50 mcg inhalation powder, 1 puff(s), Inhale, bid, 11 refills    albuterol 90 mcg/inh inhalation aerosol, 2 puff(s), Inhale, 1-4x/day, PRN, 2 refills    amitriptyline 10 mg oral tablet, 20 mg= 2 tab(s), Oral, hs, 3 refills    Cipro 500 mg oral tablet,  500 mg= 1 tab(s), Oral, q12 hrs, 1 refills    EpiPen 2-Wilver 0.3 mg injectable kit, 0.3 mg, IM, once, 1 refills    fluticasone 50 mcg/inh nasal spray, 2 spray(s), Nasal, daily, 4 refills    hyoscyamine 0.125 mg oral tablet, 0.125 mg= 1 tab(s), Oral, qid, PRN, 3 refills    metroNIDAZOLE 500 mg oral tablet, 500 mg= 1 tab(s), Oral, q8 hrs, 1 refills    pantoprazole 40 mg oral delayed release tablet, 1 tab(s), Oral, daily, 3 refills  Allergies    Augmentin (hives)    Zithromax (nausea, diarrhea)    doxycycline (Hives)

## 2022-02-15 NOTE — NURSING NOTE
Comprehensive Intake Entered On:  2/4/2019 4:32 PM CST    Performed On:  2/4/2019 4:27 PM CST by Sandra Felix CMA               Summary   Chief Complaint :   Medication management   Menstrual Status :   Hysterectomy   Weight Measured :   251.6 lb(Converted to: 251 lb 10 oz, 114.12 kg)    Height Measured :   60.5 in(Converted to: 5 ft 0 in, 153.67 cm)    Body Mass Index :   48.32 kg/m2 (HI)    Body Surface Area :   2.2 m2   Systolic Blood Pressure :   138 mmHg (HI)    Diastolic Blood Pressure :   80 mmHg   Mean Arterial Pressure :   99 mmHg   Peripheral Pulse Rate :   74 bpm   BP Site :   Right arm   Pulse Site :   Radial artery   BP Method :   Manual   HR Method :   Manual   Temperature Tympanic :   97.7 DegF(Converted to: 36.5 DegC)  (LOW)    Race :      Languages :   English   Ethnicity :   Not  or    Sandra Felix CMA - 2/4/2019 4:27 PM CST   Health Status   Allergies Verified? :   Yes   Medication History Verified? :   Yes   Medical History Verified? :   No   Pre-Visit Planning Status :   Completed   Tobacco Use? :   Never smoker   Sandra Felix CMA - 2/4/2019 4:27 PM CST   Meds / Allergies   (As Of: 2/4/2019 4:32:10 PM CST)   Allergies (Active)   Augmentin  Estimated Onset Date:   Unspecified ; Reactions:   hives ; Created By:   Myla Tyler CMA; Reaction Status:   Active ; Category:   Drug ; Substance:   Augmentin ; Type:   Allergy ; Updated By:   Myla Tyler CMA; Reviewed Date:   2/4/2019 4:29 PM CST      doxycycline  Estimated Onset Date:   <not entered> 8/1/1997 ; Reactions:   Hives ; Created By:   Belen Adams; Reaction Status:   Active ; Category:   Drug ; Substance:   doxycycline ; Type:   Allergy ; Updated By:   Belen Adams; Source:   Paper Chart ; Reviewed Date:   2/4/2019 4:29 PM CST      Zithromax  Estimated Onset Date:   Unspecified ; Reactions:   nausea, diarrhea ; Created By:   Nicole Spicer CMA; Reaction Status:   Active ; Category:    Drug ; Substance:   Zithromax ; Type:   Allergy ; Updated By:   Nicole Spicer CMA; Source:   Patient ; Reviewed Date:   2/4/2019 4:29 PM CST        Medication List   (As Of: 2/4/2019 4:32:10 PM CST)   Prescription/Discharge Order    albuterol  :   albuterol ; Status:   Prescribed ; Ordered As Mnemonic:   albuterol 90 mcg/inh inhalation aerosol ; Simple Display Line:   2 puff(s), INH, 1-4x/day, aerochamber with valve, PRN: for wheezing, 17 g, 2 Refill(s) ; Ordering Provider:   Jack Mcfadden MD; Catalog Code:   albuterol ; Order Dt/Tm:   1/23/2018 4:16:09 PM          amitriptyline  :   amitriptyline ; Status:   Prescribed ; Ordered As Mnemonic:   amitriptyline 10 mg oral tablet ; Simple Display Line:   20 mg, 2 tab(s), po, hs, 190 tab(s), 3 Refill(s) ; Ordering Provider:   Jack Mcfadden MD; Catalog Code:   amitriptyline ; Order Dt/Tm:   1/23/2018 4:16:53 PM          EPINEPHrine  :   EPINEPHrine ; Status:   Prescribed ; Ordered As Mnemonic:   EpiPen 2-Wilver 0.3 mg injectable kit ; Simple Display Line:   0.3 mg, IM, Once, 1 EA, 1 Refill(s) ; Ordering Provider:   Jack Mcfadden MD; Catalog Code:   EPINEPHrine ; Order Dt/Tm:   1/23/2018 4:18:12 PM          fluticasone-salmeterol  :   fluticasone-salmeterol ; Status:   Prescribed ; Ordered As Mnemonic:   Advair Diskus 100 mcg-50 mcg inhalation powder ; Simple Display Line:   1 puff(s), inh, bid, give 1 month supply, 60 EA, 0 Refill(s) ; Ordering Provider:   Jack Mcfadden MD; Catalog Code:   fluticasone-salmeterol ; Order Dt/Tm:   1/30/2019 9:38:32 AM          fluticasone nasal  :   fluticasone nasal ; Status:   Prescribed ; Ordered As Mnemonic:   fluticasone 50 mcg/inh nasal spray ; Simple Display Line:   2 spray(s), nasal, daily, 3 EA, 4 Refill(s) ; Ordering Provider:   Jack Mcfadden MD; Catalog Code:   fluticasone nasal ; Order Dt/Tm:   1/23/2018 4:16:20 PM          hyoscyamine  :   hyoscyamine ; Status:   Prescribed ; Ordered As Mnemonic:   hyoscyamine 0.125  mg oral tablet ; Simple Display Line:   0.125 mg, 1 tab(s), PO, QID, PRN: for loose stool, 10 tab(s), 3 Refill(s) ; Ordering Provider:   Jack Mcfadden MD; Catalog Code:   hyoscyamine ; Order Dt/Tm:   1/23/2018 4:16:40 PM          pantoprazole  :   pantoprazole ; Status:   Prescribed ; Ordered As Mnemonic:   pantoprazole 40 mg oral delayed release tablet ; Simple Display Line:   1 tab(s), Oral, daily, 30 tab(s), 0 Refill(s) ; Ordering Provider:   Jack Mcfadden MD; Catalog Code:   pantoprazole ; Order Dt/Tm:   1/28/2019 8:48:54 AM

## 2022-02-15 NOTE — PROGRESS NOTES
"Chief Complaint    c/o tongue swelling with a \" prickly sensation\" some difficulty swallowing since last night, currently on antibiotic for cat bite  History of Present Illness      Chief complaint as above reviewed and confirmed with patient.  Pt presents to the clinic with concerns re: possible allergy to medication.  Pt is on day 9 of metrondidazole and bactrim for use after cat bites.  Her wounds are healing well without signs of infection.       Yesterday devloped a burning sensation in the mouth and tongue, a mild feeling of thickness in the throat.  She took her allegra last night, monitored sx.  they are no worse today and no better.  She denies rash.  she does have some loose stools and nausea.  No fever.  she uses an inhaled steroid and washes her mouth.  Denies seeing any white patches or plaques in the mouth. No sob, difficulty breathing or chest pain. no rash.  Pt reports similar sx with previous drug allergies.  Review of Systems      Review of systems is negative with the exception of those noted in HPI          Physical Exam   Vitals & Measurements    T: 98  F (Tympanic)  HR: 92 (Peripheral)  BP: 132/82  SpO2: 99%     HT: 61 in       nad appears well      lungs CTA      heart RRR      pt has nonlabored breathing, no distress of respirations.       able to talk in full sentences. managing secretions well.       oral exam, no obvious swelling present. Tongue has some hypertrophied papilla but no ulcerations, no white patches or plaques in the posterior pharynx or the buccal or labial mucosa.         Assessment/Plan       1. Glossitis (K14.0)         pt experiencing burning sensation thick ness of the tongue for the last 24+ hours, not worsening. Can not exclude allergy, does not appear to be true anaphylaxis.  I gave her zyrtec 20 mg in the clinic.  she was observed and vitals stable, no respiratory distress.  I did give her prednisone for home use and refilled epi pen.  It does not appear to be " typical thrush but at risk given her inhaled steroids and also being on abx recently.  Will monitor.  push fluids.  fu for persistent or worsening sx.  She understands that if there are any worsening of sx she should present to the ED immediately.       Orders:         predniSONE, = 2 tab(s) ( 40 mg ), Oral, daily, x 5 day(s), # 10 tab(s), 0 Refill(s), Type: Acute, Pharmacy: [id number], 2 tab(s) Oral daily,x5 day(s), 61, in, 08/14/21 11:03:00 CDT, Height Measured, 253, lb, 04/26/21 14:27:00 CDT, Weight Measured, (Ordered)  Patient Information     Name:ANDRADE ESPARZA      Address:      [address] [place] [phone]     Sex:Female     YOB: 1955     [phone]     Emergency Contact:TOMASA HANSON     MRN:[id number]     FIN:[id number]     Location:[facility]     Date of Service:08/14/2021      Primary Care Physician:       Jack Mcfadden MD, [phone]      Attending Physician:       Silvia Howell PA-C, [phone]  Problem List/Past Medical History    Ongoing     Allergic asthma       Comments: fall/spring- mild persistant     Cholecystitis     Dyslipidemia     GERD (gastroesophageal reflux disease)     History of adenomatous polyp of colon     IBS (irritable bowel syndrome)     Obese     Osteopenia     Prediabetes    Historical     Chicken pox  Procedure/Surgical History     DEXA - Dual energy X-ray photon absorptiometry (03/25/2016)            Comments: Osteopenia stable.     Colonoscopy (08/21/2014)            Comments: Indication: screening      Sedation: MAC      Tubulovillous adenoma x2      Repeat in 3 years. Polyps x2 - random bx.     DEXA (08/02/2010)            Comments: Osteopenia, slightly worse. Repeat 3-5 yrs..     Left Bunionectomy (12/2008)           Coronary angiogram (08/2007)           Cardiovascular stress testing (03/2007)           Colonoscopy normal-recheck 2014 (08/2004)            Comments: Normal. Repeat 10 yrs. Due 2014..      Arthroscopy of left shoulder (09/2003)           Cardiovascular stress testing (07/10/2003)           Esophagogastroduodenoscopy (07/2003)           LAVH - Laparoscopy assisted vaginal hysterectomy with BSO (02/2003)           Cholecystectomy (2000)           laparoscopy-removal uterine fibroid (11/24/1997)           Colonoscopy            Comments: Sedation:  Fentanyl and Versed      Polyps:  tubular adenoma      Diverticuli: sigmoid      F/u in 5 years.     right ovarian cystectomy           Tonsillectomy        Medications    Advair Diskus 100 mcg-50 mcg inhalation powder, 1 puff(s), Inhale, bid, 11 refills    albuterol 90 mcg/inh inhalation aerosol, 2 puff(s), Inhale, 1-4x/day, PRN, 2 refills    amitriptyline 10 mg oral tablet, 20 mg= 2 tab(s), Oral, hs, 3 refills    Bactrim  mg-160 mg oral tablet, 1 tab(s), Oral, bid    EpiPen 2-Wilver 0.3 mg injectable kit, 0.3 mg, IM, once, 1 refills    fluticasone 50 mcg/inh nasal spray, 2 spray(s), Nasal, daily, 4 refills    hyoscyamine 0.125 mg oral tablet, 0.125 mg= 1 tab(s), Oral, qid, PRN, 3 refills    metroNIDAZOLE 500 mg oral tablet, 500 mg= 1 tab(s), Oral, tid    Nasonex 50 mcg/inh nasal spray, 2 spray(s), Nasal, daily, PRN, 3 refills    pantoprazole 40 mg oral delayed release tablet, 1 tab(s), Oral, daily, 3 refills    predniSONE 20 mg oral tablet, 40 mg= 2 tab(s), Oral, daily  Allergies    Augmentin (hives)    Zithromax (nausea, diarrhea)    doxycycline (Hives)    metroNIDAZOLE    sulfa drug  Social History    Smoking Status     Never smoker     Alcohol      Quit in 1975     Electronic Cigarette/Vaping      Electronic Cigarette Use: Never.     Employment/School      Retired, Work/School description: Ag teacher, [employer]..     Exercise - Occasional exercise      Exercise frequency: 3-4 times/week. Exercise type: Bicycling, Walking, Weight lifting.     Home/Environment      Marital status: Single. Living situation: Home/Independent.  Injuries/Abuse/Neglect in household: No. Feels unsafe at home: No. Family/Friends available for support: Yes.     Nutrition/Health      Diet: Low fat. Sleeping concerns: No. Feels highly stressed: No.     Sexual      Sexually active: No.     Substance Abuse      Never     Tobacco      Never (less than 100 in lifetime)  Family History    Alzheimer's disease: Father and Grandmother (P).    Asthma: Mother.    Brain tumor: Uncle (M).    Diabetes mellitus: Father.    Hypertension: Grandmother (M).    MI: Father.    Obesity: Grandmother (M).    Osteoarthritis: Mother.  Immunizations       Scheduled Immunizations       Dose Date(s)       influenza       09/24/2015       influenza virus vaccine, inactivated       09/29/2012, 11/01/2018, 08/30/2019, 09/24/2020       pneumococcal (PPSV23)       08/22/2011       SARS-CoV-2 (COVID-19) Moderna-1273       03/05/2021, 04/02/2021       tetanus/diphth/pertuss (Tdap) adult/adol       01/31/2008       zoster vaccine, inactivated       11/30/2020, 02/11/2021       Other Immunizations               Hep B       11/24/2003, 12/26/2003, 06/23/2004       influenza virus vaccine, inactivated       09/16/2013, 09/12/2014, 10/12/2017       pneumococcal (PPSV23)       08/31/2020, 01/28/2021       tetanus/diphth/pertuss (Tdap) adult/adol       01/23/2018       ZOS, shingles       06/25/2014

## 2022-02-15 NOTE — NURSING NOTE
20 mg given per BAM for possible allergic reaction     Cetirizine 10 mg (x2)    Lot: [id number]  Exp: 10/2021

## 2022-02-15 NOTE — TELEPHONE ENCOUNTER
---------------------  From: Diane Baca   Sent: 11/3/2020 3:09:58 PM CST  Subject: Curbside Testing     Patient was in for curbside testing.   Per [facility]      O2 sat= 98  Specimen sent to [facility].     Forms faxed to [facility]      Priority# [phone]

## 2022-02-15 NOTE — NURSING NOTE
Comprehensive Intake Entered On:  2019 1:21 PM CDT    Performed On:  2019 1:12 PM CDT by Sandra Felix CMA               Summary   Chief Complaint :   c/o LLQ pain, poss diverticulitis flare up   Menstrual Status :   Hysterectomy   Height Measured :   60.5 in(Converted to: 5 ft 0 in, 153.67 cm)    Ht/Wt Measurement Refused by Patient? :   Yes   Systolic Blood Pressure :   140 mmHg (HI)    Diastolic Blood Pressure :   80 mmHg   Mean Arterial Pressure :   100 mmHg   Peripheral Pulse Rate :   76 bpm   BP Site :   Right arm   Pulse Site :   Radial artery   BP Method :   Manual   HR Method :   Manual   Temperature Tympanic :   99.2 DegF(Converted to: 37.3 DegC)    Race :      Languages :   English   Ethnicity :   Not  or    Sandra Felix CMA - 2019 1:12 PM CDT   Health Status   Allergies Verified? :   Yes   Medication History Verified? :   Yes   Medical History Verified? :   No   Pre-Visit Planning Status :   Completed   Tobacco Use? :   Never smoker   Sandra Felix CMA - 2019 1:12 PM CDT   Demographics   Last Name :   Fish   Address :   [address] [place] [address]   First Name :   María Elena Polk Initial :   j   Responsible Party Date of Birth () :   1955 CDT   City :   [place]   State :   [place]   Zip Code :   [address]   Sandra Felix CMA - 2019 1:12 PM CDT   Meds / Allergies   (As Of: 2019 1:21:17 PM CDT)   Allergies (Active)   Augmentin  Estimated Onset Date:   Unspecified ; Reactions:   hives ; Created By:   Myla Tyler CMA; Reaction Status:   Active ; Category:   Drug ; Substance:   Augmentin ; Type:   Allergy ; Updated By:   Myla Tyler CMA; Reviewed Date:   2019 1:17 PM CDT      doxycycline  Estimated Onset Date:   <not entered> 1997 ; Reactions:   Hives ; Created By:   Belen Adams; Reaction Status:   Active ; Category:   Drug ; Substance:   doxycycline ; Type:   Allergy ; Updated By:   Belen Adams; Source:   Paper Chart ;  Reviewed Date:   5/30/2019 1:17 PM CDT      Zithromax  Estimated Onset Date:   Unspecified ; Reactions:   nausea, diarrhea ; Created By:   Nicole Spicer CMA; Reaction Status:   Active ; Category:   Drug ; Substance:   Zithromax ; Type:   Allergy ; Updated By:   Nicole Spicer CMA; Source:   Patient ; Reviewed Date:   5/30/2019 1:17 PM CDT        Medication List   (As Of: 5/30/2019 1:21:17 PM CDT)   Prescription/Discharge Order    albuterol  :   albuterol ; Status:   Prescribed ; Ordered As Mnemonic:   albuterol 90 mcg/inh inhalation aerosol ; Simple Display Line:   2 puff(s), INH, 1-4x/day, aerochamber with valve, PRN: for wheezing, 17 g, 2 Refill(s) ; Ordering Provider:   Jack Mcfadden MD; Catalog Code:   albuterol ; Order Dt/Tm:   1/23/2018 4:16:09 PM          amitriptyline  :   amitriptyline ; Status:   Prescribed ; Ordered As Mnemonic:   amitriptyline 10 mg oral tablet ; Simple Display Line:   20 mg, 2 tab(s), po, hs, fill when needed, 190 tab(s), 3 Refill(s) ; Ordering Provider:   Jack Mcfadden MD; Catalog Code:   amitriptyline ; Order Dt/Tm:   2/4/2019 5:12:32 PM          ciprofloxacin  :   ciprofloxacin ; Status:   Prescribed ; Ordered As Mnemonic:   Cipro 500 mg oral tablet ; Simple Display Line:   500 mg, 1 tab(s), PO, q12hr, for 10 day(s), 20 tab(s), 0 Refill(s) ; Ordering Provider:   Tara [facility]; Catalog Code:   ciprofloxacin ; Order Dt/Tm:   4/6/2019 3:22:42 PM          EPINEPHrine  :   EPINEPHrine ; Status:   Prescribed ; Ordered As Mnemonic:   EpiPen 2-Wilver 0.3 mg injectable kit ; Simple Display Line:   0.3 mg, IM, Once, 1 EA, 1 Refill(s) ; Ordering Provider:   Jack Mcfadden MD; Catalog Code:   EPINEPHrine ; Order Dt/Tm:   1/23/2018 4:18:12 PM          fluticasone-salmeterol  :   fluticasone-salmeterol ; Status:   Prescribed ; Ordered As Mnemonic:   Advair Diskus 100 mcg-50 mcg inhalation powder ; Simple Display Line:   1 puff(s), inh, bid, fill when needed, 60 EA, 11 Refill(s) ;  Ordering Provider:   Jack Mcfadden MD; Catalog Code:   fluticasone-salmeterol ; Order Dt/Tm:   2/4/2019 5:11:18 PM          fluticasone nasal  :   fluticasone nasal ; Status:   Prescribed ; Ordered As Mnemonic:   fluticasone 50 mcg/inh nasal spray ; Simple Display Line:   2 spray(s), nasal, daily, fill when needed, 3 EA, 4 Refill(s) ; Ordering Provider:   Jack Mcfadden MD; Catalog Code:   fluticasone nasal ; Order Dt/Tm:   2/4/2019 5:12:56 PM          hyoscyamine  :   hyoscyamine ; Status:   Prescribed ; Ordered As Mnemonic:   hyoscyamine 0.125 mg oral tablet ; Simple Display Line:   0.125 mg, 1 tab(s), PO, QID, fill when needed, PRN: for loose stool, 10 tab(s), 3 Refill(s) ; Ordering Provider:   Jack Mcfadden MD; Catalog Code:   hyoscyamine ; Order Dt/Tm:   2/4/2019 5:12:45 PM          metroNIDAZOLE  :   metroNIDAZOLE ; Status:   Prescribed ; Ordered As Mnemonic:   metroNIDAZOLE 500 mg oral tablet ; Simple Display Line:   500 mg, 1 tab(s), PO, q8hr, for 10 day(s), 30 tab(s), 0 Refill(s) ; Ordering Provider:   Tara [facility]; Catalog Code:   metroNIDAZOLE ; Order Dt/Tm:   4/6/2019 3:22:59 PM          pantoprazole  :   pantoprazole ; Status:   Prescribed ; Ordered As Mnemonic:   pantoprazole 40 mg oral delayed release tablet ; Simple Display Line:   1 tab(s), Oral, daily, fill when needed, 95 tab(s), 3 Refill(s) ; Ordering Provider:   Jack Mcfadden MD; Catalog Code:   pantoprazole ; Order Dt/Tm:   2/4/2019 5:12:10 PM

## 2022-02-15 NOTE — TELEPHONE ENCOUNTER
---------------------  From: Jack Mcfadden MD   To: MARÍA ELENA ESPARZA    Sent: 2/11/2019 10:42:57 AM CST  Subject: Labs     Dear María Elena    Labs are stable.    Results:  Date Result Name Ind Value Ref Range   2/9/2019 11:11 AM Glucose Level ((H)) 102 mg/dL (65 - 99)   2/9/2019 11:11 AM Cholesterol ((H)) 267 mg/dL ( - <200)   2/9/2019 11:11 AM Non-HDL Cholesterol ((H)) 216 ( - <130)   2/9/2019 11:11 AM HDL  51 mg/dL (>50 - )   2/9/2019 11:11 AM Cholesterol/HDL Ratio ((H)) 5.2 ( - <5.0)   2/9/2019 11:11 AM LDL ((H)) 190    2/9/2019 11:11 AM Triglyceride  128 mg/dL ( - <150)     Gerald Mcfadden MD

## 2022-02-15 NOTE — TELEPHONE ENCOUNTER
---------------------  From: Jack Mcfadden MD   To: MARÍA ELENA ESPARZA    Sent: 9/29/2021 11:46:28 AM CDT  Subject: Mammogram     Dear María Elena    This letter is to inform you that we have received a copy of your mammogram results from 9/27/2021.  You will also receive notice of your results from the radiologist within 7-10 days.  This letter is to let you know I have also received a copy and it is filed in your clinic chart.      Please plan on having your next mammogram done in 12 months.    Gerald Mcfadden M.D.

## 2022-02-15 NOTE — NURSING NOTE
Medicare Visit Entered On:  1/28/2021 11:14 AM CST    Performed On:  1/28/2021 11:02 AM CST by Sandra Felix CMA               Summary   Chief Complaint :   AWV    Menstrual Status :   Hysterectomy   Weight Measured :   253.4 lb(Converted to: 253 lb 6 oz, 114.940 kg)    Height Measured :   61 in(Converted to: 5 ft 1 in, 154.94 cm)    Body Mass Index :   47.87 kg/m2 (HI)    Body Surface Area :   2.22 m2   Systolic Blood Pressure :   132 mmHg (HI)    Diastolic Blood Pressure :   76 mmHg   Mean Arterial Pressure :   95 mmHg   Peripheral Pulse Rate :   82 bpm   BP Site :   Right arm   Pulse Site :   Radial artery   BP Method :   Manual   HR Method :   Manual   Temperature Tympanic :   97.5 DegF(Converted to: 36.4 DegC)  (LOW)    Race :      Languages :   English   Ethnicity :   Not  or    Sandra Felix CMA - 1/28/2021 11:02 AM CST   Health Status   Allergies Verified? :   Yes   Medication History Verified? :   Yes   Medical History Verified? :   No   Pre-Visit Planning Status :   Completed   Tobacco Use? :   Never smoker   Sandra Felix CMA - 1/28/2021 11:02 AM CST   Consents   Consent for Immunization Exchange :   Consent Granted   Consent for Immunizations to Providers :   Consent Granted   Sandra Felix CMA - 1/28/2021 11:02 AM CST   Problems   (As Of: 1/28/2021 11:14:49 AM CST)   Problems(Active)    Allergic asthma (SNOMED CT  :2466162668 )  Name of Problem:   Allergic asthma ; Recorder:   Belen Sawant CMA; Confirmation:   Confirmed ; Classification:   Medical ; Code:   [id number] ; Contributor System:   Vendscreen ; Last Updated:   1/24/2018 11:02 AM CST ; Life Cycle Date:   2/9/2010 ; Life Cycle Status:   Active ; Vocabulary:   SNOMED CT   ; Comments:        2/9/2010 11:48 AM - Belen Sawant CMA  fall/spring- mild persistant      Cholecystitis (SNOMED CT  :548053956 )  Name of Problem:   Cholecystitis ; Recorder:   Belen Sawant CMA; Confirmation:   Confirmed ;  Classification:   Medical ; Code:   [id number] ; Contributor System:   PowerChart ; Last Updated:   1/24/2018 11:02 AM CST ; Life Cycle Date:   2/9/2010 ; Life Cycle Status:   Active ; Vocabulary:   SNOMED CT        Dyslipidemia (SNOMED CT  :7265367059 )  Name of Problem:   Dyslipidemia ; Recorder:   Belen Sawant CMA; Confirmation:   Confirmed ; Classification:   Medical ; Code:   [id number] ; Contributor System:   PowerChart ; Last Updated:   1/24/2018 11:02 AM CST ; Life Cycle Date:   2/9/2010 ; Life Cycle Status:   Active ; Vocabulary:   SNOMED CT        GERD (gastroesophageal reflux disease) (SNOMED CT  :124671391 )  Name of Problem:   GERD (gastroesophageal reflux disease) ; Recorder:   Belen Sawant CMA; Confirmation:   Confirmed ; Classification:   Medical ; Code:   [id number] ; Contributor System:   PowerChart ; Last Updated:   1/24/2018 11:02 AM CST ; Life Cycle Date:   2/9/2010 ; Life Cycle Status:   Active ; Vocabulary:   SNOMED CT        History of adenomatous polyp of colon (SNOMED CT  :2081778404 )  Name of Problem:   History of adenomatous polyp of colon ; Recorder:   Kaitlin Amaro; Confirmation:   Confirmed ; Classification:   Medical ; Code:   [id number] ; Contributor System:   PowerChart ; Last Updated:   10/16/2017 12:04 PM CDT ; Life Cycle Status:   Active ; Vocabulary:   SNOMED CT        IBS (irritable bowel syndrome) (SNOMED CT  :32620251 )  Name of Problem:   IBS (irritable bowel syndrome) ; Recorder:   Belen Sawant CMA; Confirmation:   Confirmed ; Classification:   Medical ; Code:   [id number] ; Contributor System:   PowerChart ; Last Updated:   1/24/2018 11:02 AM CST ; Life Cycle Date:   2/9/2010 ; Life Cycle Status:   Active ; Vocabulary:   SNOMED CT        Obese (SNOMED CT  :6934034164 )  Name of Problem:   Obese ; Recorder:   SYSTEM; Confirmation:   Probable ; Classification:   Medical ; Code:   5911627404 ; Contributor System:   PowerChart ; Last Updated:   1/24/2018  11:02 AM CST ; Life Cycle Date:   2/23/2011 ; Life Cycle Status:   Active ; Vocabulary:   SNOMED CT        Osteopenia (SNOMED CT  :284282111 )  Name of Problem:   Osteopenia ; Recorder:   Belen Sawant CMA; Confirmation:   Confirmed ; Classification:   Medical ; Code:   764854822 ; Contributor System:   [employer] ; Last Updated:   1/24/2018 11:02 AM CST ; Life Cycle Date:   2/9/2010 ; Life Cycle Status:   Active ; Vocabulary:   SNOMED CT        Prediabetes (SNOMED CT  :9946829412 )  Name of Problem:   Prediabetes ; Onset Date:   3/11/2013 ; Recorder:   Jack Mcfadden MD; Confirmation:   Confirmed ; Classification:   Medical ; Code:   [id number] ; Contributor System:   PowerChart ; Last Updated:   1/24/2018 11:02 AM CST ; Life Cycle Date:   3/12/2013 ; Life Cycle Status:   Active ; Responsible Provider:   Jack Mcfadden MD; Vocabulary:   SNOMED CT          Meds / Allergies   (As Of: 1/28/2021 11:14:49 AM CST)   Allergies (Active)   Augmentin  Estimated Onset Date:   Unspecified ; Reactions:   hives ; Created By:   Myla Tyler CMA; Reaction Status:   Active ; Category:   Drug ; Substance:   Augmentin ; Type:   Allergy ; Updated By:   Myla Tyler CMA; Reviewed Date:   1/28/2021 11:09 AM CST      doxycycline  Estimated Onset Date:   <not entered> 8/1/1997 ; Reactions:   Hives ; Created By:   Belen Sawant CMA; Reaction Status:   Active ; Category:   Drug ; Substance:   doxycycline ; Type:   Allergy ; Updated By:   Belen Sawant CMA; Source:   Paper Chart ; Reviewed Date:   1/28/2021 11:09 AM CST      Zithromax  Estimated Onset Date:   Unspecified ; Reactions:   nausea, diarrhea ; Created By:   Nicole Spicer CMA; Reaction Status:   Active ; Category:   Drug ; Substance:   Zithromax ; Type:   Allergy ; Updated By:   Nicole Spicer CMA; Source:   Patient ; Reviewed Date:   1/28/2021 11:09 AM CST        Medication List   (As Of: 1/28/2021 11:14:49 AM CST)   Prescription/Discharge Order     albuterol  :   albuterol ; Status:   Prescribed ; Ordered As Mnemonic:   albuterol 90 mcg/inh inhalation aerosol ; Simple Display Line:   2 puff(s), INH, 1-4x/day, aerochamber with valve, PRN: for wheezing, 17 g, 2 Refill(s) ; Ordering Provider:   Jack Mcfadden MD; Catalog Code:   albuterol ; Order Dt/Tm:   3/10/2020 2:55:47 PM CDT          amitriptyline  :   amitriptyline ; Status:   Prescribed ; Ordered As Mnemonic:   amitriptyline 10 mg oral tablet ; Simple Display Line:   20 mg, 2 tab(s), po, hs, fill when needed, 190 tab(s), 3 Refill(s) ; Ordering Provider:   Jack Mcfadden MD; Catalog Code:   amitriptyline ; Order Dt/Tm:   3/10/2020 2:55:18 PM CDT          ciprofloxacin  :   ciprofloxacin ; Status:   Prescribed ; Ordered As Mnemonic:   Cipro 500 mg oral tablet ; Simple Display Line:   500 mg, 1 tab(s), PO, q12hr, for 10 day(s), 20 tab(s), 1 Refill(s) ; Ordering Provider:   Jack Mcfadden MD; Catalog Code:   ciprofloxacin ; Order Dt/Tm:   3/10/2020 2:55:00 PM CDT          EPINEPHrine  :   EPINEPHrine ; Status:   Prescribed ; Ordered As Mnemonic:   EpiPen 2-Wilver 0.3 mg injectable kit ; Simple Display Line:   0.3 mg, IM, Once, 1 EA, 1 Refill(s) ; Ordering Provider:   Jack Mcfadden MD; Catalog Code:   EPINEPHrine ; Order Dt/Tm:   1/23/2018 4:18:12 PM CST          fluticasone-salmeterol  :   fluticasone-salmeterol ; Status:   Prescribed ; Ordered As Mnemonic:   Advair Diskus 100 mcg-50 mcg inhalation powder ; Simple Display Line:   1 puff(s), inh, bid, fill when needed, 60 EA, 11 Refill(s) ; Ordering Provider:   Jack Mcfadden MD; Catalog Code:   fluticasone-salmeterol ; Order Dt/Tm:   3/10/2020 2:55:34 PM CDT          fluticasone nasal  :   fluticasone nasal ; Status:   Suspended ; Ordered As Mnemonic:   fluticasone 50 mcg/inh nasal spray ; Simple Display Line:   2 spray(s), nasal, daily, fill when needed, 3 EA, 4 Refill(s) ; Ordering Provider:   Jack Mcfadden MD; Catalog Code:   fluticasone nasal ;  Order Dt/Tm:   3/10/2020 2:55:06 PM CDT          hyoscyamine  :   hyoscyamine ; Status:   Prescribed ; Ordered As Mnemonic:   hyoscyamine 0.125 mg oral tablet ; Simple Display Line:   0.125 mg, 1 tab(s), PO, QID, fill when needed, PRN: for loose stool, 10 tab(s), 3 Refill(s) ; Ordering Provider:   Jack Mcfadden MD; Catalog Code:   hyoscyamine ; Order Dt/Tm:   3/10/2020 2:55:12 PM CDT          metroNIDAZOLE  :   metroNIDAZOLE ; Status:   Prescribed ; Ordered As Mnemonic:   metroNIDAZOLE 500 mg oral tablet ; Simple Display Line:   500 mg, 1 tab(s), PO, q8hr, for 10 day(s), 30 tab(s), 1 Refill(s) ; Ordering Provider:   Jack Mcfadden MD; Catalog Code:   metroNIDAZOLE ; Order Dt/Tm:   3/10/2020 2:54:54 PM CDT          mometasone nasal  :   mometasone nasal ; Status:   Prescribed ; Ordered As Mnemonic:   Nasonex 50 mcg/inh nasal spray ; Simple Display Line:   2 spray(s), Nasal, daily, PRN: for allergy symptoms, 3 EA, 3 Refill(s) ; Ordering Provider:   Jack Mcfadden MD; Catalog Code:   mometasone nasal ; Order Dt/Tm:   8/31/2020 3:14:50 PM CDT          pantoprazole  :   pantoprazole ; Status:   Prescribed ; Ordered As Mnemonic:   pantoprazole 40 mg oral delayed release tablet ; Simple Display Line:   1 tab(s), Oral, daily, fill when needed, 95 tab(s), 3 Refill(s) ; Ordering Provider:   Jack Mcfadden MD; Catalog Code:   pantoprazole ; Order Dt/Tm:   3/10/2020 2:55:24 PM CDT            Social History   Social History   (As Of: 1/28/2021 11:14:49 AM CST)   Alcohol:        Never   (Last Updated: 6/25/2014 4:27:39 PM CDT by Myla Tyler CMA)          Tobacco:        Never   (Last Updated: 6/25/2014 4:27:30 PM CDT by Myla Tyler CMA)   Never (less than 100 in lifetime)   (Last Updated: 1/28/2021 11:03:00 AM CST by Sandra Felix CMA)          Electronic Cigarette/Vaping:        Electronic Cigarette Use: Never.   (Last Updated: 1/28/2021 11:03:03 AM CST by Sandra Felix CMA)          Substance Abuse:        Never    (Last Updated: 6/25/2014 4:27:45 PM CDT by Myla Tyler CMA)          Employment/School:        Employed, Work/School description: Ag teacher, [employer]..   (Last Updated: 7/30/2010 9:33:09 AM CDT by Jessica Vu)          Nutrition/Health:        Diet: Low fat.   (Last Updated: 8/22/2011 4:17:17 PM CDT by Iwona Walters CMA)          Exercise:  Occasional exercise      Exercise frequency: 3-4 times/week.  Exercise type: Walking, Weight lifting.   (Last Updated: 8/22/2011 4:16:59 PM CDT by Iwona Walters CMA)          Sexual:        Sexually active: No.   Comments:  7/30/2010 9:33 AM - Jessica Vu: .   (Last Updated: 7/30/2010 9:33:28 AM CDT by Jessica Vu)            Geriatric Depression Screening   Geriatric Depression Satisfied Life :   Yes   Geriatric Depression Dropped Activities :   Yes   Geriatric Depression Life Empty :   No   Geriatric Depression Bored :   No   Geriatric Depression Good Spirits :   Yes   Geriatric Depression Afraid Bad Things :   No   Geriatric Depression Feel Happy :   Yes   Geriatric Depression Feel Helpless :   No   Geriatric Depression Prefer to Stay Home :   Yes   Geriatric Depression Memory Problems :   No   Geriatric Depression Wonderful Be Alive :   Yes   Geriatric Depression Feel Worthless :   No   Geriatric Depression Situation Hopeless :   No   Geriatric Depression Others Better Off :   No   Geriatric Depression Full of Energy :   Yes   Geriatric Depression Total Score :   2    Sandra Felix CMA - 1/28/2021 11:02 AM CST   Home Safety Screen   Emergency Numbers Kept/Updated :   Yes   Aware of Smoking Dangers :   Yes   Smoke Alarms/Fire Extinguisher Available :   Yes   Household Members Fire Safety Knowledge :   Yes   Floor Rugs Removed or Fastened :   Yes   Mats in Bathtub/Shower :   Yes   Stairway Rails or Banisters :   Yes   Outdoor Clutter Safety :   Yes   Indoor Clutter Safety :   Yes   Electrical Cord Safety :    Yes   Sandra Felix CMA - 1/28/2021 11:02 AM CST   Advance Directives   Advance Directive :   No   Sandra Felix CMA - 1/28/2021 11:02 AM CST   Almendarez Fall Risk   History of Fall in Last 3 Months [place]arez :   Yes   Sandra Felix CMA - 1/28/2021 11:02 AM CST   Functional Assessment   Focused Functional Assessment Grid   Bathing :   Independent (2)   Dressing :   Independent (2)   Toileting :   Independent (2)   Transferring Bed or Chair :   Independent (2)   Continence :   Independent (2)   Feeding :   Independent (2)   Sandra Felix CMA - 1/28/2021 11:02 AM CST   ADL Index Score :   12    Capable of Shopping :   Yes   Capable of Walking :   Yes   Capable of Housekeeping :   Yes   Capable of Managing Medications :   Yes   Capable of Handling Finances :   Yes   Sandra Felix CMA - 1/28/2021 11:02 AM CST

## 2022-02-15 NOTE — PROGRESS NOTES
Patient:   ANDRADE ESPARZA            MRN: [id number]            FIN: [id number]               Age:   65 years     Sex:  Female     :  1955   Associated Diagnoses:   Annual physical exam   Author:   Jack Mcfadden MD      Visit Information      Date of Service: 2021 10:58 am  Performing Location: [facility]  Encounter#: [id number]      Primary Care Provider (PCP):  Jack Mcfadden MD    NPI# [id number]      Chief Complaint   2021 11:02 AM CST   AWV      Well Adult History   Advanced Directive: Full code but withdraw if not improving over a week  Activities of Daily Living: able to bathe, dress and feed self  Instrumental Activities of Daily Living: able to take own medications, handle finances, prepare food and perform house keeping  Behavioral Risk: no alcohol, nonsmoker, some physical activity, good home safety  Psychosocial risk: does not feel stressed, pain, fatigue, depressed  Self Assessment: Good health status with no history of falling on ice last week  Providers: Dr Mcfadden (Primary care)  Hearing: Using hearing aides  Vision: Good with glasses    IBS well controlled. Started taking mikael and feels helping. Rarely needs the hyoscyamine (once in last 6 months). Imodium helps.  Asthma controlled with Advair. Uses it twice daily during allergy season and otherwise once daily.    Protonix helps acid reflux. Missed for two days and having symptoms  Had a low stress year and IBS controlled. Probiotic has even helped.  Had pollen related anaphylactic reaction in past.    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 Feels she has ribs popping in and out place at time dependent on the amount of lifting and carrying. Pain decreases with pressing on chest. Happens couple times  a month       Review of Systems   Constitutional:  No fever.    Respiratory:  No shortness of breath, No cough.    Cardiovascular:  No palpitations.    Breast:  Negative.    Gastrointestinal:  No nausea, No vomiting, No diarrhea, No constipation, No rectal bleeding.    Genitourinary:  No change in urine stream.    Hematology/Lymphatics:  No bruising tendency, No bleeding tendency.    Endocrine:  Negative.    Musculoskeletal:  No joint pain, No muscle pain.    Integumentary:  No rash.    Neurologic:  Alert and oriented X4, No abnormal balance, No numbness, No headache.    Psychiatric:  Negative.    All other systems reviewed and negative   PHQ-9: 0pts (March 2020). 1pt (2014); 4pts (2013).    ACT: 25 pts (January 2021). 23pts (March 2020). 25pts (February 2019). 24pts (January 2018). 25pts (January 2017)     GDS: 2pts (January 2021)  Nondrinker. Had allergic reaction age 22 to alcohol     No new significant symptoms      Health Status   Allergies:    Allergic Reactions (Selected)  Severity Not Documented  Augmentin (Hives)  Doxycycline (Hives)  Zithromax (Nausea, diarrhea)   Medications:  (Selected)   Prescriptions  Prescribed  Advair Diskus 100 mcg-50 mcg inhalation powder: 1 puff(s), inh, bid, Instructions: fill when needed, # 60 EA, 11 Refill(s), Type: Maintenance, Pharmacy: [id number], due for appointment now, 1 puff(s) Inhale bid,Instr:fill when needed  Cipro 500 mg oral tablet: = 1 tab(s) ( 500 mg ), PO, q12hr, # 20 tab(s), 1 Refill(s), Type: Maintenance, Pharmacy: [id number], 1 tab(s) Oral q12 hrs,x10 day(s)  EpiPen 2-Wilver 0.3 mg injectable kit: ( 0.3 mg ), IM, Once, # 1 EA, 1 Refill(s), Type: Soft Stop, Pharmacy: [id number], 0.3 mg im once  Nasonex 50 mcg/inh nasal spray: 2 spray(s), Nasal, daily, PRN: for allergy symptoms, # 3 EA, 3 Refill(s), Type: Maintenance, Pharmacy: [id number], 2 spray(s) Nasal daily,PRN:for allergy symptoms, 61, in, 08/31/20 14:50:00 CDT,  Height Measured, 257, lb, 03/10/20 14:33:00 C...  albuterol 90 mcg/inh inhalation aerosol: 2 puff(s), INH, 1-4x/day, Instructions: aerochamber with valve, PRN: for wheezing, # 17 g, 2 Refill(s), Type: Maintenance, Pharmacy: [facility], 2 puff(s) Inhale 1-4x/day,PRN:for wheezing,Instr:aerochamber with valve  amitriptyline 10 mg oral tablet: = 2 tab(s) ( 20 mg ), po, hs, Instructions: fill when needed, # 190 tab(s), 3 Refill(s), Type: Maintenance, Pharmacy: [facility], 2 tab(s) Oral hs,Instr:fill when needed  hyoscyamine 0.125 mg oral tablet: = 1 tab(s) ( 0.125 mg ), PO, QID, Instructions: fill when needed, PRN: for loose stool, # 10 tab(s), 3 Refill(s), Type: Maintenance, Pharmacy: [facility], 1 tab(s) Oral qid,PRN:for loose stool,Instr:fill when needed  metroNIDAZOLE 500 mg oral tablet: = 1 tab(s) ( 500 mg ), PO, q8hr, # 30 tab(s), 1 Refill(s), Type: Maintenance, Pharmacy: [facility], 1 tab(s) Oral q8 hrs,x10 day(s)  pantoprazole 40 mg oral delayed release tablet: = 1 tab(s), Oral, daily, Instructions: fill when needed, # 95 tab(s), 3 Refill(s), RAVEN, Type: Maintenance, Pharmacy: [facility], Pt due for clinic visit., 1 tab(s) Oral daily,Instr:fill when needed  Suspended  fluticasone 50 mcg/inh nasal spray: = 2 spray(s), nasal, daily, Instructions: fill when needed, # 3 EA, 4 Refill(s), Type: Maintenance, Pharmacy: [facility], 2 spray(s) Nasal daily,Instr:fill when needed   Problem list:    All Problems  Allergic asthma / SNOMED CT 0443230054 / Confirmed  Cholecystitis / SNOMED CT 843130238 / Confirmed  Dyslipidemia / SNOMED CT 3211798993 / Confirmed  GERD (gastroesophageal reflux disease) / SNOMED CT 086936724 / Confirmed  History of adenomatous polyp of colon / SNOMED CT 8349372021 / Confirmed  IBS (irritable bowel syndrome) / SNOMED CT 68975191 / Confirmed  Obese / SNOMED CT 2323105471 / Probable  Osteopenia / SNOMED CT 753556937 /  Confirmed  Prediabetes / SNOMED CT 8917440707 / Confirmed  Resolved: Chicken pox / SNOMED CT 71014652  Canceled: Tubulovillous adenoma / SNOMED CT 56864745      Histories   Procedure history:    DEXA - Dual energy X-ray photon absorptiometry (SNOMED CT 917563128) on 3/25/2016 at 60 Years.  Comments:  3/25/2016 8:07 AM CDT - Chris RIVERA-C, Alpa  Osteopenia stable  Colonoscopy (SNOMED CT 138263478) performed by Jack Mcfadden MD on 8/21/2014 at 59 Years.  Comments:  8/28/2014 5:52 PM CDT Myla Wright CMA  Polyps x2 - random bx    8/28/2014 5:49 PM JAKT Myla Wright CMA  Indication: screening  Sedation: MAC  Tubulovillous adenoma x2  Repeat in 3 years  DEXA on 8/2/2010 at 55 Years.  Comments:  8/5/2010 10:25 AM JAKT - Rashawn RIVERA-C, Jessica  Osteopenia, slightly worse. Repeat 3-5 yrs.  Left Bunionectomy in the month of 12/2008 at 53 Years.  Coronary angiogram (SNOMED CT 38895505) in the month of 8/2007 at 52 Years.  Cardiovascular stress testing (SNOMED CT 836865245) in the month of 3/2007 at 51 Years.  Colonoscopy normal-recheck 2014 (SNOMED CT 033830373) performed by Jack Mcfadden MD in the month of 8/2004 at 49 Years.  Comments:  7/30/2010 9:29 AM CDT - Rashawn RIVERA-C, Jessica  Normal. Repeat 10 yrs. Due 2014.  Arthroscopy of left shoulder in the month of 9/2003 at 48 Years.  Cardiovascular stress testing (SNOMED CT 864935073) on 7/10/2003 at 47 Years.  Esophagogastroduodenoscopy (SNOMED CT 397013395) in the month of 7/2003 at 47 Years.  LAVH - Laparoscopy assisted vaginal hysterectomy with BSO (SNOMED CT 6853885613) performed by Gregory Garcia MD in the month of 2/2003 at 47 Years.  Cholecystectomy (SNOMED CT 06984455) in 2000 at 45 Years.  laparoscopy-removal uterine fibroid on 11/24/1997 at 42 Years.  right ovarian cystectomy.  Colonoscopy (SNOMED CT 047607420) performed by Jack Mcfadden MD.  Comments:  11/9/2017 8:22 AM TREY - Meghan Mcdaniels  Sedation:  Fentanyl and Versed  Polyps:  tubular  adenoma  Diverticuli: sigmoid  F/u in 5 years     Social History: [employer] since  and retired 2019. Mom had lived with her and  . Has no immediate family. Nonsmoker  Family History: Father  heart attack. Mom  age 83. Brother  MVA       Physical Examination   Vital Signs   2021 11:02 AM CST Temperature Tympanic 97.5 DegF  LOW    Peripheral Pulse Rate 82 bpm    Pulse Site Radial artery    HR Method Manual    Systolic Blood Pressure 132 mmHg  HI    Diastolic Blood Pressure 76 mmHg    Mean Arterial Pressure 95 mmHg    BP Site Right arm    BP Method Manual      Measurements from flowsheet : Measurements   2021 11:02 AM CST Height Measured - Standard 61 in    Weight Measured - Standard 253.4 lb    BSA 2.22 m2    Body Mass Index 47.87 kg/m2  HI      General:  Alert and oriented, No acute distress.    Eye:  Normal conjunctiva.    HENT:  Tympanic membranes are clear, No pharyngeal erythema.    Neck:  Non-tender, No lymphadenopathy, No thyromegaly.    Respiratory:  Lungs are clear to auscultation, Respirations are non-labored.    Cardiovascular:  Normal rate, Regular rhythm, No murmur.    Breast:  No mass, No tenderness, No discharge.    Gastrointestinal:  Soft, Non-tender, Non-distended.    Gynecologic:  Internal and external genitalia are normal. Urethra normal. Vaginal and cervical mucosa normal. No uterine or ovarian masses..    Musculoskeletal:  Normal range of motion, Normal strength, No swelling, Normal gait.    Integumentary:  Pink, No rash.    Neurologic:  Alert, Oriented, Normal sensory, Normal motor function, No focal deficits.    Psychiatric:  Appropriate mood & affect.    Memory with immediate recall 3/3 and delayed 3/3    What year is it?   Whate date is it?   What city do you live in? [place]      Impression and Plan   Diagnosis     Annual physical exam (MXO01-YG Z00.00).         AAA screen: nonsmoker and no family history AAA  Cervical  Cancer Screening: had hysterectomy  Glaucoma Screen: Spring 2020  Nutrition weight and diet: discussed    Irritable Bowel Syndrome: renewed amitriptyline and hyocsyamine. Still taking probiotic with cinnamon and fish oil  Dyslipidemia: LDL range 175-228. Calcium score of zero (2012).  Breast Cancer screening: Biopsy with benign fibroadenoma July 2017. Normal mammogram  August 2020. Normal exam  GERD: using protonix with good beneft and refilled (zantac and prilosec did not help). Will try to taper down the dose  Allergic asthma: continue advair and flonase and albuterol  Colon cancer screening: normal 2004. Tubulovillous adenoma 2014 and tubular adenoma 8mm October 2017 with repeat October 2022  Immunizations: Pneumovax 2011 and repeat today. Adacel 2008 and 2018. Zostavax 2014 and started Shingrix  Osteopenia: DEXA March 2016 with hip -1.2 and spine 1.3 and repeat 2021  Prediabetes: stable  Diverticulitis: episode 2019 and have medications available if recurrence  Hearing: Hearing aides working well  Anaphylactic reaction: from pollen and given epinephrine. Declines epipen refill due to cost. Has never used the epipen.  Declines HIV and hepatitis C screening  Renewed medications January 2021    Discussed end of life of issues: no immediate family available  Desires CPR and ventilator if needed but if no improvement within a week consider a removal. Currently in process of finding a new HCPOA (Jen Landis)     Preventative service checklist reviewed, updated and copy given to patient. Please see scanned document.    Addendum 2/3: Bone density hip -1.2 and spine 1.6 February 2021

## 2022-02-15 NOTE — PROGRESS NOTES
Patient:   ANDRADE ESPARZA            MRN: [id number]            FIN: [id number]               Age:   62 years     Sex:  Female     :  1955   Associated Diagnoses:   Allergic Asthma; GERD (Gastroesophageal Reflux Disease); IBS (Irritable Bowel Syndrome); Musculoskeletal chest pain   Author:   Jack Mcfadden MD      Visit Information      Date of Service: 2018 03:39 pm  Performing Location: [facility]  Encounter#: [id number]      Primary Care Provider (PCP):  Jack Mcfadden MD    NPI# [id number]      Referring Provider:  Jack Mcfadden MD, NPI# [id number]      Chief Complaint   2018 3:43 PM CST    F/u chest pain      History of Present Illness     IBS well controlled.  Asthma controlled with advair. Did not go through an albuterol inhaler in the past year.    Rarely needs the hyoscyamine. Imodium helps. Only needed it one time in the summer.  Not able to get protonix and prilosec does not help much.  Had a low stress year and IBS controlled. Floragen has even helped.  Had pollen related anaphylactic reaction in past.    Coronary CT Angio: normal 2007  Cardiac calcium score (): zero  EGD: normal 2003 (done for chest pain with normal stress test)  Stress Test: normal 3/2007  Colonoscopy: normal 2004    Seen in clinic for chest pain last week. Testing negative in clinic. Pain present overnight. Pain went away when left the clinic. NO pain until shoveling snow this morning. Pain is across the chest and feels like it is more muscular from using muscles that have not been used. Pain decreasing gradually throughout the day      Review of Systems   Respiratory:  asthma and allergic rhinitis are controlled with her medications; rarely needs albuterol inhaler.    Gastrointestinal:  abdominal cramping and diarrhea continues to be improved on amitriptyline, heartburn controlled by protonix.    Gynecologic:  no longer having hot flashes.    Neurologic:  tinnitus bilateral starting   but decreased with hearing aides.    Psychiatric:  PHQ-9: 1pt (2014); 4pts (2013).    ACT: 24pts (January 2018). 25pts (January 2017)      Health Status   Allergies:    Allergic Reactions (Selected)  Severity Not Documented  Augmentin (Hives)  Doxycycline (Hives)  Zithromax (Nausea, diarrhea)   Medications:  (Selected)   Prescriptions  Prescribed  Advair Diskus 100 mcg-50 mcg inhalation powder: 1 puff(s), inh, bid, Instructions: give 3 month supply, # 60 EA, 11 Refill(s), Type: Maintenance, Pharmacy: [id number], 1 puff(s) inh bid,Instr:give 3 month supply  EpiPen 2-Wilver 0.3 mg injectable kit: ( 0.3 mg ), IM, Once, # 1 box(es), 1 Refill(s), Type: Soft Stop, Pharmacy: [id number], 0.3 mg im once  albuterol 90 mcg/inh inhalation aerosol: 2 puff(s), INH, 1-4x/day, Instructions: aerochamber with valve, PRN: for wheezing, # 17 g, 2 Refill(s), Type: Maintenance, Pharmacy: [id number], 2 puff(s) inh 1-4x/day,PRN:for wheezing,Instr:aerochamber with valve  amitriptyline 10 mg oral tablet: 2 tab(s) ( 20 mg ), po, hs, # 190 tab(s), 3 Refill(s), Type: Maintenance, Pharmacy: [id number], 2 tab(s) po hs  fluticasone 50 mcg/inh nasal spray: 2 spray(s), nasal, daily, # 3 EA, 4 Refill(s), Type: Maintenance, Pharmacy: [id number], 2 spray(s) nasal daily  hyoscyamine 0.125 mg oral tablet: 1 tab(s) ( 0.125 mg ), PO, QID, PRN: for loose stool, # 50 tab(s), 3 Refill(s), Type: Maintenance, Pharmacy: [id number], 1 tab(s) po qid,PRN:for loose stool  pantoprazole 40 mg oral delayed release tablet: 1 tab(s) ( 40 mg ), po, daily, # 95 tab(s), 3 Refill(s), Type: Maintenance, Pharmacy: [id number], appt due in May 2017, 1 tab(s) po daily   Problem list:    All Problems  Allergic Asthma / ICD-9-.90 / Confirmed  Cholecystitis / ICD-9-.10 / Confirmed  Dyslipidemia / ICD-9-.4 / Confirmed  GERD (Gastroesophageal Reflux Disease) / ICD-9-.81 /  Confirmed  History of adenomatous polyp of colon / SNOMED CT 7639043643 / Confirmed  IBS (Irritable Bowel Syndrome) / ICD-9-.1 / Confirmed  Obese / ICD-9-.00 / Probable  Osteopenia / ICD-9-.90 / Confirmed  Prediabetes / ICD-9-.29 / Confirmed  Resolved: Chicken Pox / ICD-9-.9  Canceled: Tubulovillous adenoma / SNOMED CT 85490175      Histories   Family History: Father  heart attack; Mom  age 83. Brother  MVA   Procedure history:    DEXA - Dual energy X-ray photon absorptiometry (SNOMED CT 867286559) on 3/25/2016 at 60 Years.  Comments:  3/25/2016 8:07 AM - Alpa Washington  Osteopenia stable  Colonoscopy (SNOMED CT 447764148) performed by Jack Mcfadden MD on 2014 at 59 Years.  Comments:  2014 5:52 PM - Myla Tyler CMA  Polyps x2 - random bx    2014 5:49 PM - Myla Tyler CMA  Indication: screening  Sedation: MAC  Tubulovillous adenoma x2  Repeat in 3 years  DEXA on 2010 at 55 Years.  Comments:  2010 10:25 AM - Jessica Vu  Osteopenia, slightly worse. Repeat 3-5 yrs.  Left Bunionectomy in the month of 2008 at 53 Years.  Coronary angiogram (SNOMED CT 96945601) in the month of 2007 at 52 Years.  Cardiovascular stress testing (SNOMED CT 378334564) in the month of 3/2007 at 51 Years.  Colonoscopy normal-recheck  (SNOMED CT 724053490) performed by Jack Mcfadden MD in the month of 2004 at 49 Years.  Comments:  2010 9:29 AM - Jessica Vu  Normal. Repeat 10 yrs. Due .  Arthroscopy of left shoulder in the month of 2003 at 48 Years.  Cardiovascular stress testing (SNOMED CT 617637877) on 7/10/2003 at 47 Years.  Esophagogastroduodenoscopy (SNOMED CT 036536723) in the month of 2003 at 47 Years.  LAVH - Laparoscopy assisted vaginal hysterectomy (SNOMED CT 3994868086) performed by Gregory Garcia MD in the month of 2003 at 47 Years.  Cholecystectomy (SNOMED CT 87771837) in  at 45  Years.  laparoscopy-removal uterine fibroid on 1997 at 42 Years.  right ovarian cystectomy.  Colonoscopy (SNOMED CT 483206831) performed by Jack Mcfdaden MD.  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 [employer] since . Mom had lived with her and  . Has no immediate family      Physical Examination   Vital Signs   2018 3:43 PM CST Temperature Tympanic 98.2 DegF    Peripheral Pulse Rate 76 bpm    Pulse Site Radial artery    HR Method Manual    Systolic Blood Pressure 140 mmHg  HI    Diastolic Blood Pressure 78 mmHg    Mean Arterial Pressure 99 mmHg    BP Site Right arm    BP Method Manual      Measurements from flowsheet : Measurements   2018 3:43 PM CST Height Measured - Standard 60.5 in    Ht/Wt Measurement Refused by Patient? Yes      General:  Alert and oriented.    Neck:  No lymphadenopathy.    Respiratory:  Lungs are clear to auscultation.    Cardiovascular:  Normal rate, Regular rhythm.    Musculoskeletal:  Normal gait.    Neurologic:  No focal deficits.    Psychiatric:  Appropriate mood & affect.       Review / Management   Documentation reviewed:  Calcium score of zero (May 2012).       Impression and Plan   Diagnosis     Allergic Asthma (KWG97-WT J45.909).     GERD (Gastroesophageal Reflux Disease) (IIL00-QP K21.9).     IBS (Irritable Bowel Syndrome) (LMI61-RE K58.9).     Musculoskeletal chest pain (XEL83-UD R07.89).       Irritable Bowel Syndrome: renewed amitriptyline and hyocsyamine 2018. Doing well with addition of floragen  Dyslipidemia: LDL range 175-228. Calcium score of zero ().  Breas Cancer screening: Biopsy with benign fibroadenoma 2017 and repeat 2018 mammogram  GERD: using protonix with good beneft and refilled (zantac and prilosec did not help)  Allergic asthma: continue advair  and flonase and albuterol (January 2018)  Colon cancer screening: normal 2004. Tubulovillous adenoma 2014 and tubular adenoma 8mm October 2017 with repeat 2022  Immunizations: Pneumovax 2011. Adacel 2008 and repeat today Zostavax 2014  Hearing: Hearing aides working well  Musculoskeletal chest pain: monitor and follow up if changes    Discussed end of life of issues: no immediate family available  Desires CPR and ventilator if needed but if no improvement within a week consider a removal. Currently in process of finding a new HCPOA

## 2022-02-15 NOTE — PROGRESS NOTES
Chief Complaint    Verbal consent given for telephone visit. Allergy sx since April. Sx worsening over last week. Drainage, sinus pain, ST, ear soreness, nausea, diarrhea. Hx of diverticulitis. Has never been tested for COVID. No known exposures.  History of Present Illness       Today's visit was conducted via telephone due to the COVID-19 pandemic. Patient's consent to telephone visit was obtained and documented.       Call Start Time:   11:40am       Call End Time:    11:52am       patient at home, [place], [place]       physician in clinic, [place], [place]       Patient is a 65-year-old female on the phone today for a telemedicine visit due to the coronavirus pandemic.       She reports ongoing allergies.  She is allergic to plants and has had allergy symptoms since April.  They typically clear up once we get into winter.  She has been using Flonase and Allegra 24-hour daily.       Over the last 4 to 5 days she has been having increased sinus drainage which is also increasing postnasal drip leading to sore throat and nausea.  The nausea improves if she eats something.  She also has been coughing more.  Her chest does feel a little bit tight but she has no trouble taking in a nice deep breath.  She does not feel short of breath.       She denies abdominal pain but feels like her irritable bowel syndrome and or diverticulitis is flaring up a little bit.  She has had some diarrhea.  No blood in the stool.       She has had myalgias but she thinks that might be from her yard work.       No fatigue.       No change in her sense of taste or smell       She is having some bilateral maxillary sinus pressure but not pain.       She is taking Tylenol and ibuprofen as needed.  Review of Systems       Negative except as listed in HPI          Physical Exam       Telemedicine visit          Assessment/Plan       Cough (R05)        Discussed with the patient that this certainly sounds like a viral upper respiratory illness on top  of her ongoing allergy symptoms.  Considering the coronavirus pandemic and spiking cases in this area I do think it is prudent to test her.  Described the curbside testing protocol.  Patient will come in today for curbside testing and she and her household will remain quarantined until they get results.        In addition to her allergy medications I recommended that she consider taking vitamin D 5000 international units daily, vitamin C 500 mg twice a day, zinc 25 to 30 mg daily and either quercetin or elderberry.        Patient should call back if she is having worsening symptoms.         Ordered:          SARS-CoV-2 RNA (COVID-19), Qualitative NAAT* ([facility]), Specimen Type: Anterior Nares, Collection Date: 11/03/20 11:51:00 CST                Encounter for screening for other viral diseases (Z11.59)         Ordered:          SARS-CoV-2 RNA (COVID-19), Qualitative NAAT* ([facility]), Specimen Type: Anterior Nares, Collection Date: 11/03/20 11:51:00 CST                Nausea (R11.0)         Ordered:          SARS-CoV-2 RNA (COVID-19), Qualitative NAAT* ([facility]), Specimen Type: Anterior Nares, Collection Date: 11/03/20 11:51:00 CST                Rhinitis (J31.0)         Ordered:          SARS-CoV-2 RNA (COVID-19), Qualitative NAAT* ([facility]), Specimen Type: Anterior Nares, Collection Date: 11/03/20 11:51:00 CST           Patient Information     Name:ANDRADE ESPARZA      Address:      [address] [place] [id number]     Sex:Female     YOB: 1955     [phone]     Emergency Contact:TOMASA HANSON     MRN:[id number]     FIN:[id number]     Location[facility]     Date of Service:11/03/2020      Primary Care Physician:       Jack Mcfadden MD, [phone]      Attending Physician:       Betina Em MD, [phone]  Problem List/Past Medical History    Ongoing     Allergic asthma       Comments: fall/spring- mild persistant     Cholecystitis     Dyslipidemia     GERD  (gastroesophageal reflux disease)     History of adenomatous polyp of colon     IBS (irritable bowel syndrome)     Obese     Osteopenia     Prediabetes    Historical     Chicken pox  Procedure/Surgical History     DEXA - Dual energy X-ray photon absorptiometry (03/25/2016)            Comments: Osteopenia stable.     Colonoscopy (08/21/2014)            Comments: Indication: screening      Sedation: MAC      Tubulovillous adenoma x2      Repeat in 3 years. Polyps x2 - random bx.     DEXA (08/02/2010)            Comments: Osteopenia, slightly worse. Repeat 3-5 yrs..     Left Bunionectomy (12/2008)           Coronary angiogram (08/2007)           Cardiovascular stress testing (03/2007)           Colonoscopy normal-recheck 2014 (08/2004)            Comments: Normal. Repeat 10 yrs. Due 2014..     Arthroscopy of left shoulder (09/2003)           Cardiovascular stress testing (07/10/2003)           Esophagogastroduodenoscopy (07/2003)           LAVH - Laparoscopy assisted vaginal hysterectomy with BSO (02/2003)           Cholecystectomy (2000)           laparoscopy-removal uterine fibroid (11/24/1997)           Colonoscopy            Comments: Sedation:  Fentanyl and Versed      Polyps:  tubular adenoma      Diverticuli: sigmoid      F/u in 5 years.     right ovarian cystectomy        Medications    Advair Diskus 100 mcg-50 mcg inhalation powder, 1 puff(s), Inhale, bid, 11 refills    albuterol 90 mcg/inh inhalation aerosol, 2 puff(s), Inhale, 1-4x/day, PRN, 2 refills    amitriptyline 10 mg oral tablet, 20 mg= 2 tab(s), Oral, hs, 3 refills    Cipro 500 mg oral tablet, 500 mg= 1 tab(s), Oral, q12 hrs, 1 refills    EpiPen 2-Wilver 0.3 mg injectable kit, 0.3 mg, IM, once, 1 refills    hyoscyamine 0.125 mg oral tablet, 0.125 mg= 1 tab(s), Oral, qid, PRN, 3 refills    metroNIDAZOLE 500 mg oral tablet, 500 mg= 1 tab(s), Oral, q8 hrs, 1 refills    Nasonex 50 mcg/inh nasal spray, 2 spray(s), Nasal, daily, PRN, 3 refills    pantoprazole  40 mg oral delayed release tablet, 1 tab(s), Oral, daily, 3 refills  Allergies    Augmentin (hives)    Zithromax (nausea, diarrhea)    doxycycline (Hives)  Social History    Smoking Status     Never smoker     Alcohol      Never     Employment/School      Employed, Work/School description: Ag teacher, [employer]..     Exercise - Occasional exercise      Exercise frequency: 3-4 times/week. Exercise type: Walking, Weight lifting.     Nutrition/Health      Diet: Low fat.     Sexual      Sexually active: No.     Substance Abuse      Never     Tobacco      Never  Family History    Alzheimer's disease: Father.    Brain tumor: Uncle (M).    Diabetes mellitus: Father.    MI: Father.    Osteoarthritis: Mother.  Immunizations      Vaccine Date Status          pneumococcal (PPSV23) 08/31/2020 Given          influenza virus vaccine, inactivated 08/30/2019 Recorded          influenza virus vaccine, inactivated 11/01/2018 Recorded          tetanus/diphth/pertuss (Tdap) adult/adol 01/23/2018 Given          influenza virus vaccine, inactivated 10/12/2017 Given          influenza 09/24/2015 Given          influenza virus vaccine, inactivated 09/11/2014 Given          ZOS, shingles 06/25/2014 Given          influenza virus vaccine, inactivated 09/16/2013 Given          influenza virus vaccine, inactivated 09/29/2012 Recorded          pneumococcal (PPSV23) 08/22/2011 Given          tetanus/diphth/pertuss (Tdap) adult/adol 01/31/2008 Recorded          Hep B 06/23/2004 Recorded          Hep B 12/26/2003 Recorded          Hep B 11/24/2003 Recorded

## 2022-02-15 NOTE — PROGRESS NOTES
Patient:   ANDRADE ESPARZA            MRN: [id number]            FIN: [id number]               Age:   63 years     Sex:  Female     :  1955   Associated Diagnoses:   Diverticulitis   Author:   Jack Mcfadden MD      Visit Information      Date of Service: 2019 01:00 pm  Performing Location: [facility]  Encounter#: [id number]      Primary Care Provider (PCP):  Jack Mcfadden MD    NPI# [id number]      Referring Provider:  Jack Mcfadden MD    NPI# [id number]      Chief Complaint   2019 1:12 PM CDT    c/o LLQ pain, poss diverticulitis flare up      History of Present Illness   Began with LLQ abdominal pain four days ago with some twinges. Pain continues to be intermittent and having diarrhea. Pain lasts for a few minutes at a times and has had about 5 short episodes today. Denies vomiting. Symptoms stable.  Treated for 1st episode of diverticulitis 2019 and responded to antibiotics      Review of Systems   Constitutional:  No fever.    Respiratory:  No shortness of breath.    Integumentary:  No rash.       Health Status   Allergies:    Allergic Reactions (Selected)  Severity Not Documented  Augmentin (Hives)  Doxycycline (Hives)  Zithromax (Nausea, diarrhea)   Medications:  (Selected)   Prescriptions  Prescribed  Advair Diskus 100 mcg-50 mcg inhalation powder: 1 puff(s), inh, bid, Instructions: fill when needed, # 60 EA, 11 Refill(s), Type: Maintenance, Pharmacy: [facility]/pharmacy #[id number], due for appointment now, 1 puff(s) Inhale bid,Instr:fill when needed  Cipro 500 mg oral tablet: = 1 tab(s) ( 500 mg ), PO, q12hr, # 20 tab(s), 0 Refill(s), Type: Maintenance, Pharmacy: [facility]/pharmacy #[id number], 1 tab(s) Oral q12 hrs,x10 day(s)  EpiPen 2-Wilver 0.3 mg injectable kit: ( 0.3 mg ), IM, Once, # 1 EA, 1 Refill(s), Type: Soft Stop, Pharmacy: [facility] #[id number], 0.3 mg im once  albuterol 90 mcg/inh inhalation aerosol: 2 puff(s), INH, 1-4x/day, Instructions: aerochamber with valve, PRN: for  wheezing, # 17 g, 2 Refill(s), Type: Maintenance, Pharmacy: [facility] #[id number], 2 puff(s) inh 1-4x/day,PRN:for wheezing,Instr:aerochamber with valve  amitriptyline 10 mg oral tablet: = 2 tab(s) ( 20 mg ), po, hs, Instructions: fill when needed, # 190 tab(s), 3 Refill(s), Type: Maintenance, [id number], 2 tab(s) Oral hs,Instr:fill when needed  fluticasone 50 mcg/inh nasal spray: = 2 spray(s), nasal, daily, Instructions: fill when needed, # 3 EA, 4 Refill(s), Type: Maintenance, [id number], 2 spray(s) Nasal daily,Instr:fill when needed  hyoscyamine 0.125 mg oral tablet: = 1 tab(s) ( 0.125 mg ), PO, QID, Instructions: fill when needed, PRN: for loose stool, # 10 tab(s), 3 Refill(s), Type: Maintenance, [id number], 1 tab(s) Oral qid,PRN:for loose stool,Instr:fill when needed  metroNIDAZOLE 500 mg oral tablet: = 1 tab(s) ( 500 mg ), PO, q8hr, # 30 tab(s), 0 Refill(s), Type: Maintenance, [id number], 1 tab(s) Oral q8 hrs,x10 day(s)  pantoprazole 40 mg oral delayed release tablet: = 1 tab(s), Oral, daily, Instructions: fill when needed, # 95 tab(s), 3 Refill(s), RAVEN, Type: Maintenance, Pharmacy: [facility]/pharmacy #[id number], Pt due for clinic visit., 1 tab(s) Oral daily,Instr:fill when needed   Problem list:    All Problems  Allergic asthma / SNOMED CT 6028062791 / Confirmed  Cholecystitis / SNOMED CT 426889166 / Confirmed  Dyslipidemia / SNOMED CT 7802100580 / Confirmed  GERD (gastroesophageal reflux disease) / SNOMED CT 098058568 / Confirmed  History of adenomatous polyp of colon / SNOMED CT 8161953798 / Confirmed  IBS (irritable bowel syndrome) / SNOMED CT 55898931 / Confirmed  Obese / SNOMED CT 8375049120 / Probable  Osteopenia / SNOMED CT 694032514 / Confirmed  Prediabetes / SNOMED CT 6463915330 / Confirmed  Resolved: Chicken pox / SNOMED CT 23597125  Canceled: Tubulovillous adenoma / SNOMED CT 29260156      Histories   Procedure history:    DEXA - Dual  energy X-ray photon absorptiometry (SNOMED CT 047783077) on 3/25/2016 at 60 Years.  Comments:  3/25/2016 8:07 AM TUTU - Alpa Washington  Osteopenia stable  Colonoscopy (SNOMED CT 493181593) performed by Jack Mcfadden MD on 8/21/2014 at 59 Years.  Comments:  8/28/2014 5:52 PM CDT - Myla Tyler CMA  Polyps x2 - random bx    8/28/2014 5:49 PM CDT - Myla Tyler CMA  Indication: screening  Sedation: MAC  Tubulovillous adenoma x2  Repeat in 3 years  DEXA on 8/2/2010 at 55 Years.  Comments:  8/5/2010 10:25 AM TUTU - Jessica Vu  Osteopenia, slightly worse. Repeat 3-5 yrs.  Left Bunionectomy in the month of 12/2008 at 53 Years.  Coronary angiogram (SNOMED CT 69231125) in the month of 8/2007 at 52 Years.  Cardiovascular stress testing (SNOMED CT 894147407) in the month of 3/2007 at 51 Years.  Colonoscopy normal-recheck 2014 (SNOMED CT 223913345) performed by Jack Mcfadden MD in the month of 8/2004 at 49 Years.  Comments:  7/30/2010 9:29 AM Jessica Parkinson  Normal. Repeat 10 yrs. Due 2014.  Arthroscopy of left shoulder in the month of 9/2003 at 48 Years.  Cardiovascular stress testing (SNOMED CT 424699795) on 7/10/2003 at 47 Years.  Esophagogastroduodenoscopy (SNOMED CT 855208535) in the month of 7/2003 at 47 Years.  LAVH - Laparoscopy assisted vaginal hysterectomy with BSO (SNOMED CT 5747118286) performed by Gregory Garcia MD in the month of 2/2003 at 47 Years.  Cholecystectomy (SNOMED CT 42669724) in 2000 at 45 Years.  laparoscopy-removal uterine fibroid on 11/24/1997 at 42 Years.  right ovarian cystectomy.  Colonoscopy (SNOMED CT 551379662) performed by Jack Mcfadden MD.  Comments:  11/9/2017 8:22 AM TREY - Meghan Mcdaniels  Sedation:  Fentanyl and Versed  Polyps:  tubular adenoma  Diverticuli: sigmoid  F/u in 5 years      Physical Examination   Vital Signs   5/30/2019 1:12 PM CDT Temperature Tympanic 99.2 DegF    Peripheral Pulse Rate 76 bpm    Pulse Site Radial artery    HR Method  Manual    Systolic Blood Pressure 140 mmHg  HI    Diastolic Blood Pressure 80 mmHg    Mean Arterial Pressure 100 mmHg    BP Site Right arm    BP Method Manual      Measurements from flowsheet : Measurements   5/30/2019 1:12 PM CDT Height Measured - Standard 60.5 in    Ht/Wt Measurement Refused by Patient? Yes      General:  Alert and oriented, No acute distress.    Neck:  No lymphadenopathy.    Respiratory:  Lungs are clear to auscultation.    Cardiovascular:  Normal rate, Regular rhythm.    Gastrointestinal:  Soft, Non-distended, mild tenderness LLQ with no rebound or guarding.    Musculoskeletal:  Normal gait.    Psychiatric:  Appropriate mood & affect.       Review / Management   Reviewed Hysterectomy 2003 and had bilateral oophorectomy confirmed on pathology      Impression and Plan   Diagnosis     Diverticulitis (SZU97-OO K57.92).     Diverticulitis: Treat with ciprofloxacin and metronidazole. Follow up if not improving

## 2022-02-15 NOTE — PROGRESS NOTES
Patient:   ANDRADE ESPARZA            MRN: [id number]            FIN: [id number]               Age:   62 years     Sex:  Female     :  1955   Associated Diagnoses:   Tubulovillous adenoma   Author:   Jack Mcfadden MD      Visit Information      Date of Service: 10/12/2017 04:20 pm  Performing Location: [facility]  Encounter#: [id number]      Primary Care Provider (PCP):  Jack Mcfadden MD    NPI# [id number]      Referring Provider:  Jack Mcfadden MD    NPI# [id number]      Chief Complaint   10/12/2017 4:50 PM CDT   Colonoscopy consult        History of Present Illness   Here to get scheduled for colonoscopy.   Normal colonoscopy 2004  Tubulovillous adenoma     Only needed one amitriptyline daily during the summer.         Review of Systems   Constitutional:  No fever.    Respiratory:  No shortness of breath.    Cardiovascular:  No chest pain.    Gastrointestinal:  No vomiting, No diarrhea, No constipation.    Hematology/Lymphatics:  No bruising tendency, No bleeding tendency.           All other systems reviewed and negative     No history of bleeding disorders or blood transfusion  No prior problems with anesthesia  No family history of anesthesia problems  No positive responses for sleep apnea  Denies plavix, coumadin  Denies history of DVT/PE  Denies recent corticosteroid use    Able to walk a flight of stairs without chest pain or shortness of breath.      Health Status   Allergies:    Allergic Reactions (Selected)  Severity Not Documented  Augmentin (Hives)  Doxycycline (Hives)  Zithromax (Nausea, diarrhea)   Medications:  (Selected)   Prescriptions  Prescribed  Advair Diskus 100 mcg-50 mcg inhalation powder: 1 puff(s), inh, bid, Instructions: give 3 month supply, # 60 EA, 11 Refill(s), Type: Maintenance, Pharmacy: [facility] #[id number], 1 puff(s) inh bid,Instr:give 3 month supply  EpiPen 2-Wilver 0.3 mg injectable kit: ( 0.3 mg ), IM, Once, # 1 box(es), 1 Refill(s), Type: Soft Stop,  Pharmacy: [facility] #[id number], 0.3 mg im once  albuterol 90 mcg/inh inhalation aerosol: 2 puff(s), INH, 1-4x/day, Instructions: aerochamber with valve, PRN: for wheezing, # 17 g, 2 Refill(s), Type: Maintenance, Pharmacy: [facility] #[id number], 2 puff(s) inh 1-4x/day,PRN:for wheezing,Instr:aerochamber with valve  amitriptyline 10 mg oral tablet: 2 tab(s) ( 20 mg ), po, hs, # 190 tab(s), 3 Refill(s), Type: Maintenance, Pharmacy: [facility] #[id number], 2 tab(s) po hs  fluticasone 50 mcg/inh nasal spray: 2 spray(s), nasal, daily, # 3 EA, 4 Refill(s), Type: Maintenance, Pharmacy: [facility] #[id number], 2 spray(s) nasal daily  hyoscyamine 0.125 mg oral tablet: 1 tab(s) ( 0.125 mg ), PO, QID, PRN: for loose stool, # 50 tab(s), 3 Refill(s), Type: Maintenance, Pharmacy: [facility] #[id number], 1 tab(s) po qid,PRN:for loose stool  pantoprazole 40 mg oral delayed release tablet: 1 tab(s) ( 40 mg ), po, daily, # 95 tab(s), 3 Refill(s), Type: Maintenance, Pharmacy: [facility] #[id number], appt due in May 2017, 1 tab(s) po daily   Problem list:    All Problems  Allergic Asthma / ICD-9-.90 / Confirmed  Tubulovillous adenoma / SNOMED CT 61594944 / Confirmed  Cholecystitis / ICD-9-.10 / Confirmed  Dyslipidemia / ICD-9-.4 / Confirmed  GERD (Gastroesophageal Reflux Disease) / ICD-9-.81 / Confirmed  IBS (Irritable Bowel Syndrome) / ICD-9-.1 / Confirmed  Obese / ICD-9-.00 / Probable  Osteopenia / ICD-9-.90 / Confirmed  Prediabetes / ICD-9-.29 / Confirmed  Resolved: Chicken Pox / ICD-9-.9      Histories   Procedure history:    DEXA - Dual energy X-ray photon absorptiometry (SNOMED CT 694511536) on 3/25/2016 at 60 Years.  Comments:  3/25/2016 8:07 AM - Alpa Washington  Osteopenia stable  Colonoscopy (SNOMED CT 819861739) performed by Jack Mcfadden MD on 8/21/2014 at 59 Years.  Comments:  8/28/2014 5:52 PM - Myla Tyler CMA  Polyps x2 - random bx    8/28/2014 5:49 PM - Nayeli  Myla MURPHY  Indication: screening  Sedation: MAC  Tubulovillous adenoma x2  Repeat in 3 years  DEXA on 2010 at 55 Years.  Comments:  2010 10:25 AM - Jessica Vu  Osteopenia, slightly worse. Repeat 3-5 yrs.  Left Bunionectomy in the month of 2008 at 53 Years.  Coronary angiogram (SNOMED CT 48512928) in the month of 2007 at 52 Years.  Cardiovascular stress testing (SNOMED CT 193804069) in the month of 3/2007 at 51 Years.  Colonoscopy normal-recheck  (SNOMED CT 909003672) performed by Jack Mcfadden MD in the month of 2004 at 49 Years.  Comments:  2010 9:29 AM - Jessica Vu  Normal. Repeat 10 yrs. Due .  Arthroscopy of left shoulder in the month of 2003 at 48 Years.  Cardiovascular stress testing (SNOMED CT 663949158) on 7/10/2003 at 47 Years.  Esophagogastroduodenoscopy (SNOMED CT 322901451) in the month of 2003 at 47 Years.  LAVH - Laparoscopy assisted vaginal hysterectomy (SNOMED CT 3953082514) performed by Gregory Garcia MD in the month of 2003 at 47 Years.  Cholecystectomy (SNOMED CT 27314421) in  at 45 Years.  laparoscopy-removal uterine fibroid on 1997 at 42 Years.  right ovarian cystectomy.   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 Mom had lived with her and  ; has no immediate family      Physical Examination   Vital Signs   10/12/2017 4:50 PM CDT Temperature Tympanic 98.9 DegF    Peripheral Pulse Rate 74 bpm    Systolic Blood Pressure 126 mmHg    Diastolic Blood Pressure 80 mmHg      General:  Alert and oriented, No acute distress.    HENT:  No pharyngeal erythema.    Neck:  No lymphadenopathy.    Respiratory:  Lungs are clear to auscultation.    Cardiovascular:  Normal rate, Regular rhythm.    Gastrointestinal:  Soft, Non-tender, Non-distended.    Musculoskeletal:  Normal gait.    Neurologic:  No focal deficits.    Psychiatric:  Appropriate mood & affect.       Review / Management    Results review:  Lab results   10/7/2017 9:40 AM CDT Glucose Level 102 mg/dL  HI    Cholesterol 267 mg/dL  HI      HI   .    EKG: Sinus rhythm. Normal QTc and MA interval with no qwaves or ST changes. Good R wave progression       Impression and Plan   Diagnosis     Tubulovillous adenoma (UJH59-UR D36.9).       Colon Polyp surveillance: normal colonoscopy 2004. Tubulovillous adenoma 2014 and repeat 2017. Discussed risks and benefits of colonoscopy.    Irritable Bowel Syndrome: renewed amitriptyline and hyocsyamine. Doing well with addition of floragen  Dyslipidemia: LDL range 175-228. With calcium score of zero (2012). Risk of MI 5.5% (2.9%) over 10 years  GERD: using protonix with good beneft and refilled (zantac did not help)  Allergic asthma: continue advair and flonase and albuterol Jan 2017

## 2022-02-15 NOTE — PROGRESS NOTES
Chief Complaint    Cat bites and scratches on both forearms. Judging at [facility] today. Cat is UTD on vaccines. Tdap 1/23/18.       History of Present Illness      Chief complaint as above reviewed and confirmed with patient.  Pt presents to the clinic with concerns re: multiple cat bites/scratches to the forearms and hands. judging the cat when it became upset.  House cat, not outdoors. UTD on immunizations. [facility] was notified. and cat will be isolated for 10 days .  Review of Systems      Review of systems is negative with the exception of those noted in HPI          Physical Exam   Vitals & Measurements    T: 99.2  F (Tympanic)  HR: 84 (Peripheral)  BP: 134/78            nad appears well      there are multiple punctures and linear sratches to the B UE from hand to elbow.       no erythema, swelling, inflammation.  Assessment/Plan       1. Cat bite (W55.01XA)         abx as ordered.  warm compresses, ibuprofen/tylenol/elevation for pain. RTC for persistent or worsneing sx.       Orders:         metroNIDAZOLE, = 1 tab(s) ( 500 mg ), Oral, tid, x 10 day(s), # 30 tab(s), 0 Refill(s), Type: Acute, Pharmacy: [id number], 1 tab(s) Oral tid,x10 day(s), 61, in, 04/26/21 14:27:00 CDT, Height Measured, 253, lb, 04/26/21 14:27:00 CDT, Weight Measured, (Ordered)         sulfamethoxazole-trimethoprim, 1 tab(s), Oral, bid, x 10 day(s), # 20 tab(s), 0 Refill(s), Type: Acute, Pharmacy: [id number], 1 tab(s) Oral bid,x10 day(s), 61, in, 04/26/21 14:27:00 CDT, Height Measured, 253, lb, 04/26/21 14:27:00 CDT, Weight Measured, (Ordered)  Patient Information     Name:ANDRADE ESPARZA      Address:      [address] [place] [phone]     Sex:Female     YOB: 1955     [phone]     Emergency Contact:TOMASA HANSON     MRN:[id number]     FIN:[id number]     Location:[facility]     Date of Service:08/06/2021      Primary Care Physician:       Jack Mcfadden MD,  [phone]      Attending Physician:       Dante JORGENSEN, Silvia SHEPPARD, [phone]  Problem List/Past Medical History    Ongoing     Allergic asthma       Comments: fall/spring- mild persistant     Cholecystitis     Dyslipidemia     GERD (gastroesophageal reflux disease)     History of adenomatous polyp of colon     IBS (irritable bowel syndrome)     Obese     Osteopenia     Prediabetes    Historical     Chicken pox  Procedure/Surgical History     DEXA - Dual energy X-ray photon absorptiometry (03/25/2016)            Comments: Osteopenia stable.     Colonoscopy (08/21/2014)            Comments: Indication: screening      Sedation: MAC      Tubulovillous adenoma x2      Repeat in 3 years. Polyps x2 - random bx.     DEXA (08/02/2010)            Comments: Osteopenia, slightly worse. Repeat 3-5 yrs..     Left Bunionectomy (12/2008)           Coronary angiogram (08/2007)           Cardiovascular stress testing (03/2007)           Colonoscopy normal-recheck 2014 (08/2004)            Comments: Normal. Repeat 10 yrs. Due 2014..     Arthroscopy of left shoulder (09/2003)           Cardiovascular stress testing (07/10/2003)           Esophagogastroduodenoscopy (07/2003)           LAVH - Laparoscopy assisted vaginal hysterectomy with BSO (02/2003)           Cholecystectomy (2000)           laparoscopy-removal uterine fibroid (11/24/1997)           Colonoscopy            Comments: Sedation:  Fentanyl and Versed      Polyps:  tubular adenoma      Diverticuli: sigmoid      F/u in 5 years.     right ovarian cystectomy           Tonsillectomy        Medications    Advair Diskus 100 mcg-50 mcg inhalation powder, 1 puff(s), Inhale, bid, 11 refills    albuterol 90 mcg/inh inhalation aerosol, 2 puff(s), Inhale, 1-4x/day, PRN, 2 refills    amitriptyline 10 mg oral tablet, 20 mg= 2 tab(s), Oral, hs, 3 refills    Bactrim  mg-160 mg oral tablet, 1 tab(s), Oral, bid    EpiPen 2-Wilver 0.3 mg injectable kit, 0.3 mg, IM, once, 1  refills    hyoscyamine 0.125 mg oral tablet, 0.125 mg= 1 tab(s), Oral, qid, PRN, 3 refills    metroNIDAZOLE 500 mg oral tablet, 500 mg= 1 tab(s), Oral, tid    Nasonex 50 mcg/inh nasal spray, 2 spray(s), Nasal, daily, PRN, 3 refills    pantoprazole 40 mg oral delayed release tablet, 1 tab(s), Oral, daily, 3 refills  Allergies    Augmentin (hives)    Zithromax (nausea, diarrhea)    doxycycline (Hives)  Social History    Smoking Status     Never smoker     Alcohol      Quit in 1975     Electronic Cigarette/Vaping      Electronic Cigarette Use: Never.     Employment/School      Retired, Work/School description:  teacher, [employer]..     Exercise - Occasional exercise      Exercise frequency: 3-4 times/week. Exercise type: Bicycling, Walking, Weight lifting.     Home/Environment      Marital status: Single. Living situation: Home/Independent. Injuries/Abuse/Neglect in household: No. Feels unsafe at home: No. Family/Friends available for support: Yes.     Nutrition/Health      Diet: Low fat. Sleeping concerns: No. Feels highly stressed: No.     Sexual      Sexually active: No.     Substance Abuse      Never     Tobacco      Never (less than 100 in lifetime)  Family History    Alzheimer's disease: Father and Grandmother (P).    Asthma: Mother.    Brain tumor: Uncle (M).    Diabetes mellitus: Father.    Hypertension: Grandmother (M).    MI: Father.    Obesity: Grandmother (M).    Osteoarthritis: Mother.  Immunizations       Scheduled Immunizations       Dose Date(s)       influenza       09/24/2015       influenza virus vaccine, inactivated       09/29/2012, 11/01/2018, 08/30/2019, 09/24/2020       pneumococcal (PPSV23)       08/22/2011       SARS-CoV-2 (COVID-19) Moderna-1273       03/05/2021, 04/02/2021       tetanus/diphth/pertuss (Tdap) adult/adol       01/31/2008       zoster vaccine, inactivated       11/30/2020, 02/11/2021       Other Immunizations               Hep B       11/24/2003, 12/26/2003,  06/23/2004       influenza virus vaccine, inactivated       09/16/2013, 09/12/2014, 10/12/2017       pneumococcal (PPSV23)       08/31/2020, 01/28/2021       tetanus/diphth/pertuss (Tdap) adult/adol       01/23/2018       ZOS, shingles       06/25/2014

## 2022-02-15 NOTE — RESULTS
Patient:   ANDRADE ESPARZA            MRN: [id number]            FIN: [id number]               Age:   65 years     Sex:  Female     :  1955   Associated Diagnoses:   None   Author:   Leobardo Abraham MD DIAGNOSIS:  Osteopenia    FINDINGS:  On 2021, an evaluation of your patient was performed using the GE Lunar Prodigy DEXA Scanner.  The results of the study, expressed as bone mineral density (BMD) are as follows:                               BMD (g/cm2)                      T-Score                              Z-Score  AP Spine (L1-L4)           1.373 1.6 3.2    Left Right Left Right Left Right   Femoral Neck   0.876 0.878 -1.2 -1.2 0.4 0.4   Total Hip   0.996 0.915 -0.1 -0.7 1.2 0.5                      PREVIOUS SCAN DATE:  2016    DEFINITION OF TERMS AND VALUES  Terms:   BMD:  Bone mineral density (gm/cm2).   T#:  Standard deviation of the patient's bone mineral density from that of a young adult patient.   Z#:  Standard deviation of a patient's BMD from that of an age matched patient.    Values According to the World Health Organization:   Normal range:  +/- 1 standard deviation   Osteoporosis:  BMD of 2.5 standard deviations below that of a young adult (i.e. T# -2.5).   Osteopenia:  Values falling between -1 standard deviation and those values where osteoporosis           threshold is reached.    Significance of the Values:   For each standard deviation (T#) below the normal value of the young adult, there is an approximate doubling    of the risk for fracture (i.e. T# -1 equals a two-fold increased risk, T# -2 equals a four-fold increased risk, etc.)   over that of a young adult.    FRAX* RESULTS:  (version:  3.5)    10-year Probability of Fracture    Major Osteoporotic Fracture   7.1% Hip Fracture  0.6%   Population:                 [place] ()  Risk Factors:               None     Based on Femur (Right) Neck BMD    1 - The 10-year probability of fracture may be lower  than reported if the patient has received treatment.  2 - Major Osteoporotic Fracture:  Clinical Spine, Forearm, Hip or Shoulder    *FRAX is a trademark of the [employer] [facility] Metabolic [employer] Disease, a World Health Organization   (WHO) Collaborating [place].      CONCLUSION:    _ Normal         X Osteopenia          _ Osteoporosis          _ No significant change    COMMENTS: Stable since 2016.      Ordering HCP:  Jack Mcfadden MD   Interpreting HCP:  Leobardo Abraham MD, FACP

## 2022-02-15 NOTE — TELEPHONE ENCOUNTER
---------------------  From: Jack Mcfadden MD   To: MARÍA ELENA ESPARZA    Sent: 5/3/2021 1:42:14 PM CDT  Subject: MRI     Dear María Elena    Report below. Will refer to Orthopedics and get their opinion.      CONCLUSION:    1.  There is a small to moderate-sized intrasubstance partial-thickness tear  anterior to the mid supraspinatus tendon insertion.  This maximally involves one  half of the tendon fiber thickness.  Moderate surrounding tendinopathy.  2.  There is a small to moderate-sized segment of grade IV  chondromalacia/full-thickness chondral defect overlying the superior posterior  medial humeral head.  3.  There is a small glenohumeral joint effusion with synovitis/or blood  products.  Loose body tissue appears likely present within the subscapularis  recess area of the joint.  4.  Marked AC joint DJD.  Marked subacromial/subdeltoid bursitis.  5.  Moderate intra-articular long head biceps tendinopathy.    Gerald Mcfadden M.D.

## 2022-02-15 NOTE — PROGRESS NOTES
Patient:   ANDRADE ESPARZA            MRN: [id number]            FIN: [id number]               Age:   61 years     Sex:  Female     :  1955   Associated Diagnoses:   Allergic Asthma; Cough; GERD (Gastroesophageal Reflux Disease); IBS (Irritable Bowel Syndrome)   Author:   Jack Mcfadden MD      Visit Information      Date of Service: 2017 03:34 pm  Performing Location: [facility]  Encounter#: [id number]      Primary Care Provider (PCP):  Jack Mcfadden MD    NPI# [id number]      Referring Provider:  No referring provider recorded for selected visit.      Chief Complaint   2017 3:38 PM CST    med check/refill on pantoprazole      History of Present Illness     IBS well controlled.  Asthma controlled with advair. Did not go through an albuterol inhaler in the past year.    Rarely needs the hyoscyamine. Only needed it one time in the summer.  Not able to get protonix and prilosec does not help much.  Had a low stress year and IBS controlled. Floragen has even helped.  Had pollen related anaphylactic reaction in past.    Coronary CT Angio: normal 2007  Cardiac calcium score (): zero  EGD: normal 2003 (done for chest pain with normal stress test)  Stress Test: normal 3/2007  Colonoscopy: normal 2004      Review of Systems   Respiratory:  asthma and allergic rhinitis are controlled with her medications; rarely needs albuterol inhaler.    Gastrointestinal:  abdominal cramping and diarrhea continues to be improved on amitriptyline, heartburn controlled by protonix.    Gynecologic:  no longer having hot flashes.    Neurologic:  tinnitus bilateral starting .    Psychiatric:  PHQ-9: 1pt (); 4pts ().    ACT: 25pts (2017)      Health Status   Allergies:    Allergic Reactions (Selected)  Severity Not Documented  Augmentin (Hives)  Doxycycline (Hives)  Zithromax (Nausea, diarrhea)   Medications:  (Selected)   Prescriptions  Prescribed  Advair Diskus 100 mcg-50 mcg  inhalation powder: 1 puff(s), inh, bid, Instructions: give 3 month supply, # 60 EA, 11 Refill(s), Type: Maintenance, Pharmacy: [facility], 1 puff(s) inh bid,Instr:give 3 month supply  EpiPen 2-Wilver 0.3 mg injectable kit: ( 0.3 mg ), IM, Once, # 1 EA, 1 Refill(s), Type: Soft Stop, Pharmacy: [facility] #, 0.3 mg im once  albuterol CFC free 90 mcg/inh inhalation aerosol: 2 puff(s), INH, 1-4x/day, Instructions: aerochamber with valve, PRN: for wheezing, # 17 g, 2 Refill(s), Type: Maintenance, Pharmacy: [facility] #, 2 puff(s) inh 1-4x/day,PRN:for wheezing,Instr:aerochamber with valve  amitriptyline 10 mg oral tablet: 2 tab(s) ( 20 mg ), po, hs, # 190 tab(s), 3 Refill(s), Type: Maintenance, Pharmacy: [facility] #, 2 tab(s) po hs  fluticasone 50 mcg/inh nasal spray: 2 spray(s), nasal, daily, # 3 EA, 4 Refill(s), Type: Maintenance, Pharmacy: [facility] #, 2 spray(s) nasal daily  hyoscyamine 0.125 mg oral tablet: 1 tab(s) ( 0.125 mg ), PO, QID, PRN: for loose stool, # 50 tab(s), 3 Refill(s), Type: Maintenance, Pharmacy: [facility] #[facility], 1 tab(s) po qid,PRN:for loose stool  pantoprazole 40 mg oral delayed release tablet: 1 tab(s) ( 40 mg ), po, daily, # 90 tab(s), 0 Refill(s), Type: Maintenance, Pharmacy: [id number], appt due in May 2017   Problem list:    All Problems  Allergic Asthma / ICD-9-.90 / Confirmed  Tubulovillous adenoma / SNOMED CT 47143937 / Confirmed  Cholecystitis / ICD-9-.10 / Confirmed  Dyslipidemia / ICD-9-.4 / Confirmed  GERD (Gastroesophageal Reflux Disease) / ICD-9-.81 / Confirmed  IBS (Irritable Bowel Syndrome) / ICD-9-.1 / Confirmed  Obese / ICD-9-.00 / Probable  Osteopenia / ICD-9-.90 / Confirmed  Prediabetes / ICD-9-.29 / Confirmed  Resolved: Chicken Pox / ICD-9-.9      Histories   Family History: Father  heart attack; Brother  MVA   Procedure history:    DEXA - Dual energy  X-ray photon absorptiometry (SNOMED CT 782126281) on 3/25/2016 at 60 Years.  Comments:  3/25/2016 8:07 AM - Alpa Washington  Osteopenia stable  Colonoscopy (SNOMED CT 210126736) performed by Jack Mcfadden MD on 2014 at 59 Years.  Comments:  2014 5:52 PM - Myla Tyler CMA  Polyps x2 - random bx    2014 5:49 PM - Myla Tyler CMA  Indication: screening  Sedation: MAC  Tubulovillous adenoma x2  Repeat in 3 years  DEXA on 2010 at 55 Years.  Comments:  2010 10:25 AM - Jessica Vu  Osteopenia, slightly worse. Repeat 3-5 yrs.  Left Bunionectomy in the month of 2008 at 53 Years.  Coronary angiogram (SNOMED CT 36374620) in the month of 2007 at 52 Years.  Cardiovascular stress testing (SNOMED CT 993241533) in the month of 3/2007 at 51 Years.  Colonoscopy normal-recheck  (SNOMED CT 316715230) performed by Jack Mcfadden MD in the month of 2004 at 49 Years.  Comments:  2010 9:29 AM - Jessica Vu  Normal. Repeat 10 yrs. Due .  Arthroscopy of left shoulder in the month of 2003 at 48 Years.  Cardiovascular stress testing (SNOMED CT 102317823) on 7/10/2003 at 47 Years.  Esophagogastroduodenoscopy (SNOMED CT 718169614) in the month of 2003 at 47 Years.  LAVH - Laparoscopy assisted vaginal hysterectomy (SNOMED CT 3985540458) performed by Gregory Garcia MD in the month of 2003 at 47 Years.  Cholecystectomy (SNOMED CT 99867725) in  at 45 Years.  laparoscopy-removal uterine fibroid on 1997 at 42 Years.  right ovarian cystectomy.   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    Neck:  No lymphadenopathy.     Respiratory:  Lungs are clear to auscultation.    Cardiovascular:  Normal rate, Regular rhythm.    Musculoskeletal:  Normal gait.    Neurologic:  No focal deficits.    Psychiatric:  Appropriate mood & affect.       Review / Management   Documentation reviewed:  Calcium score of zero (May 2012).       Impression and Plan   Diagnosis     Allergic Asthma (OHF88-AV J45.909).     Cough (JKA99-QV R05).     GERD (Gastroesophageal Reflux Disease) (TUA38-BL K21.9).     IBS (Irritable Bowel Syndrome) (JGE99-XJ K58.9).       Irritable Bowel Syndrome: renewed amitriptyline and hyocsyamine. Doing well with addition of floragen  Dyslipidemia: LDL range 175-228. With calcium score of zero (2012). Risk of MI 5.5% (2.9%) over 10 years  GERD: using protonix with good beneft and refilled (zantac did not help)  Allergic asthma: continue advair and flonase and albuterol Jan 2017  Colon cancer screening: normal 2004. Tubulovillous adenoma 2014 and repeat 2017  Immunizations: Pneumovax 2011; Adacel 2008; Zostavax 2014  Hearing: decreased and missing many conversations. Refer to Audiology  Cough with laryngitis: present for 3-4 weeks. Levaquin and refer to ENT if not improving    Discussed end of life of issues: no immediate family available  Desires CPR and ventilator if needed but if no improvement within a week consider a removal    Addendum 7/8: Mammogram July 2017  No evidence of malignancy and no significant change in the appearance of the right breast. However, in the left breast at approximately 3:00 zone 3 there is a developing asymmetry with associated pleomorphic calcifications.  IMPRESSION:   Recommend compression magnification CC and full-field true lateral views of the left breast with a possible left breast ultrasound.    Addendum 7/23: Biopsy showed benign fibroadenoma of the breast and recommend repeat mammogram in July 2018

## 2022-02-15 NOTE — NURSING NOTE
Asthma Control Test (ACT) Total Entered On:  2/5/2019 2:47 PM CST    Performed On:  2/4/2019 2:47 PM CST by Ella Dugan               Asthma Control Test (ACT) Total   Asthma Control Test Total (Adult) :   25    Asthma Action Plan Provided? :   No   Ella Dugan - 2/5/2019 2:47 PM CST

## 2022-02-15 NOTE — NURSING NOTE
Comprehensive Intake Entered On:  2/20/2020 9:30 AM CST    Performed On:  2/20/2020 9:25 AM CST by Sandra Felix CMA               Summary   Chief Complaint :   c/o right shoulder pain for the past 3 weeks after tripping and hitting it on a flower pot   Menstrual Status :   Hysterectomy   Height Measured :   60.5 in(Converted to: 5 ft 0 in, 153.67 cm)    Ht/Wt Measurement Refused by Patient? :   Yes   Systolic Blood Pressure :   138 mmHg (HI)    Diastolic Blood Pressure :   78 mmHg   Mean Arterial Pressure :   98 mmHg   Peripheral Pulse Rate :   74 bpm   BP Site :   Right arm   Pulse Site :   Radial artery   BP Method :   Manual   HR Method :   Manual   Temperature Tympanic :   97.4 DegF(Converted to: 36.3 DegC)  (LOW)    Race :      Languages :   English   Ethnicity :   Not  or    Sandra Felix CMA - 2/20/2020 9:25 AM CST   Health Status   Allergies Verified? :   Yes   Medication History Verified? :   Yes   Medical History Verified? :   No   Pre-Visit Planning Status :   Not completed   Tobacco Use? :   Never smoker   Sandra Felix CMA - 2/20/2020 9:25 AM CST   Consents   Consent for Immunization Exchange :   Consent Granted   Consent for Immunizations to Providers :   Consent Granted   Sandra Felix CMA - 2/20/2020 9:25 AM CST   Meds / Allergies   (As Of: 2/20/2020 9:30:03 AM CST)   Allergies (Active)   Augmentin  Estimated Onset Date:   Unspecified ; Reactions:   hives ; Created By:   Myla Tyler CMA; Reaction Status:   Active ; Category:   Drug ; Substance:   Augmentin ; Type:   Allergy ; Updated By:   Myla Tyler CMA; Reviewed Date:   2/20/2020 9:27 AM CST      doxycycline  Estimated Onset Date:   <not entered> 8/1/1997 ; Reactions:   Hives ; Created By:   Belen Adams; Reaction Status:   Active ; Category:   Drug ; Substance:   doxycycline ; Type:   Allergy ; Updated By:   Belen Adams; Source:   Paper Chart ; Reviewed Date:   2/20/2020 9:27 AM CST       Zithromax  Estimated Onset Date:   Unspecified ; Reactions:   nausea, diarrhea ; Created By:   Nicole Spicer CMA; Reaction Status:   Active ; Category:   Drug ; Substance:   Zithromax ; Type:   Allergy ; Updated By:   Nicole Spicer CMA; Source:   Patient ; Reviewed Date:   2/20/2020 9:27 AM CST        Medication List   (As Of: 2/20/2020 9:30:03 AM CST)   Prescription/Discharge Order    albuterol  :   albuterol ; Status:   Prescribed ; Ordered As Mnemonic:   albuterol 90 mcg/inh inhalation aerosol ; Simple Display Line:   2 puff(s), INH, 1-4x/day, aerochamber with valve, PRN: for wheezing, 17 g, 2 Refill(s) ; Ordering Provider:   Jack Mcfadden MD; Catalog Code:   albuterol ; Order Dt/Tm:   1/23/2018 4:16:09 PM CST          amitriptyline  :   amitriptyline ; Status:   Prescribed ; Ordered As Mnemonic:   amitriptyline 10 mg oral tablet ; Simple Display Line:   20 mg, 2 tab(s), po, hs, fill when needed, 190 tab(s), 3 Refill(s) ; Ordering Provider:   Jack Mcfadden MD; Catalog Code:   amitriptyline ; Order Dt/Tm:   2/4/2019 5:12:32 PM CST          ciprofloxacin  :   ciprofloxacin ; Status:   Prescribed ; Ordered As Mnemonic:   Cipro 500 mg oral tablet ; Simple Display Line:   500 mg, 1 tab(s), PO, q12hr, for 10 day(s), 20 tab(s), 1 Refill(s) ; Ordering Provider:   Jack Mcfadden MD; Catalog Code:   ciprofloxacin ; Order Dt/Tm:   5/30/2019 1:40:36 PM CDT          EPINEPHrine  :   EPINEPHrine ; Status:   Prescribed ; Ordered As Mnemonic:   EpiPen 2-Wilver 0.3 mg injectable kit ; Simple Display Line:   0.3 mg, IM, Once, 1 EA, 1 Refill(s) ; Ordering Provider:   Jack Mcfadden MD; Catalog Code:   EPINEPHrine ; Order Dt/Tm:   1/23/2018 4:18:12 PM CST          fluticasone-salmeterol  :   fluticasone-salmeterol ; Status:   Prescribed ; Ordered As Mnemonic:   Advair Diskus 100 mcg-50 mcg inhalation powder ; Simple Display Line:   1 puff(s), inh, bid, fill when needed, 60 EA, 11 Refill(s) ; Ordering Provider:   Bogdan  Jack RADFORD; Catalog Code:   fluticasone-salmeterol ; Order Dt/Tm:   2/4/2019 5:11:18 PM CST          fluticasone nasal  :   fluticasone nasal ; Status:   Prescribed ; Ordered As Mnemonic:   fluticasone 50 mcg/inh nasal spray ; Simple Display Line:   2 spray(s), nasal, daily, fill when needed, 3 EA, 4 Refill(s) ; Ordering Provider:   Jack Mcfadden MD; Catalog Code:   fluticasone nasal ; Order Dt/Tm:   2/4/2019 5:12:56 PM CST          hyoscyamine  :   hyoscyamine ; Status:   Prescribed ; Ordered As Mnemonic:   hyoscyamine 0.125 mg oral tablet ; Simple Display Line:   0.125 mg, 1 tab(s), PO, QID, fill when needed, PRN: for loose stool, 10 tab(s), 3 Refill(s) ; Ordering Provider:   Jack Mcfadden MD; Catalog Code:   hyoscyamine ; Order Dt/Tm:   2/4/2019 5:12:45 PM CST          metroNIDAZOLE  :   metroNIDAZOLE ; Status:   Prescribed ; Ordered As Mnemonic:   metroNIDAZOLE 500 mg oral tablet ; Simple Display Line:   500 mg, 1 tab(s), PO, q8hr, for 10 day(s), 30 tab(s), 1 Refill(s) ; Ordering Provider:   Jack Mcfadden MD; Catalog Code:   metroNIDAZOLE ; Order Dt/Tm:   5/30/2019 1:40:45 PM CDT          pantoprazole  :   pantoprazole ; Status:   Prescribed ; Ordered As Mnemonic:   pantoprazole 40 mg oral delayed release tablet ; Simple Display Line:   1 tab(s), Oral, daily, fill when needed, 95 tab(s), 3 Refill(s) ; Ordering Provider:   Jack Mcfadden MD; Catalog Code:   pantoprazole ; Order Dt/Tm:   2/4/2019 5:12:10 PM CST

## 2022-02-15 NOTE — NURSING NOTE
Comprehensive Intake Entered On:  10/20/2021 10:49 AM CDT    Performed On:  10/20/2021 10:42 AM CDT by Sandra Felix CMA               Summary   Chief Complaint :   Discuss ongoing allergies and arthritis    Advance Directive :   No   Menstrual Status :   Hysterectomy   Height Measured :   61 in(Converted to: 5 ft 1 in, 154.94 cm)    Ht/Wt Measurement Refused by Patient? :   Yes   Systolic Blood Pressure :   128 mmHg   Diastolic Blood Pressure :   76 mmHg   Mean Arterial Pressure :   93 mmHg   Peripheral Pulse Rate :   80 bpm   BP Site :   Right arm   Pulse Site :   Radial artery   BP Method :   Manual   HR Method :   Electronic   Temperature Tympanic :   98.1 DegF(Converted to: 36.7 DegC)    Oxygen Saturation :   99 %   Race :      Languages :   English   Ethnicity :   Not  or    Sandra Felix CMA - 10/20/2021 10:42 AM CDT   Health Status   Allergies Verified? :   Yes   Medication History Verified? :   Yes   Medical History Verified? :   No   Pre-Visit Planning Status :   Not completed   Tobacco Use? :   Never smoker   Sandra Felix CMA - 10/20/2021 10:42 AM CDT   Consents   Consent for Immunization Exchange :   Consent Granted   Consent for Immunizations to Providers :   Consent Granted   Sandra Felix CMA - 10/20/2021 10:42 AM CDT   Meds / Allergies   (As Of: 10/20/2021 10:49:26 AM CDT)   Allergies (Active)   Augmentin  Estimated Onset Date:   Unspecified ; Reactions:   hives ; Created By:   Myla Tyler CMA; Reaction Status:   Active ; Category:   Drug ; Substance:   Augmentin ; Type:   Allergy ; Updated By:   Myla Tyler CMA; Reviewed Date:   10/20/2021 10:45 AM CDT      doxycycline  Estimated Onset Date:   <not entered> 8/1/1997 ; Reactions:   Hives ; Created By:   Belen Adams; Reaction Status:   Active ; Category:   Drug ; Substance:   doxycycline ; Type:   Allergy ; Updated By:   Belen Adams; Source:   Paper Chart ; Reviewed Date:   10/20/2021 10:45 AM  CDT      metroNIDAZOLE  Estimated Onset Date:   Unspecified ; Created By:   Silvia Howell PA-C; Reaction Status:   Active ; Category:   Drug ; Substance:   metroNIDAZOLE ; Type:   Allergy ; Updated By:   Silvia Howell PA-C; Reviewed Date:   10/20/2021 10:45 AM CDT      Seasonal/Environmental Allergies  Estimated Onset Date:   Unspecified ; Created By:   Sandra Felix CMA; Reaction Status:   Active ; Category:   Environment ; Substance:   Seasonal/Environmental Allergies ; Type:   Allergy ; Updated By:   Sandra Felix CMA; Reviewed Date:   10/20/2021 10:45 AM CDT      sulfa drug  Estimated Onset Date:   Unspecified ; Created By:   Silvia Howell PA-C; Reaction Status:   Active ; Category:   Drug ; Substance:   sulfa drug ; Type:   Allergy ; Updated By:   Silvia Howell PA-C; Reviewed Date:   10/20/2021 10:45 AM CDT      Zithromax  Estimated Onset Date:   Unspecified ; Reactions:   nausea, diarrhea ; Created By:   Nicole Spicer CMA; Reaction Status:   Active ; Category:   Drug ; Substance:   Zithromax ; Type:   Allergy ; Updated By:   Nicole Spicer CMA; Source:   Patient ; Reviewed Date:   10/20/2021 10:45 AM CDT        Medication List   (As Of: 10/20/2021 10:49:26 AM CDT)   Prescription/Discharge Order    albuterol  :   albuterol ; Status:   Prescribed ; Ordered As Mnemonic:   albuterol 90 mcg/inh inhalation aerosol ; Simple Display Line:   2 puff(s), INH, 1-4x/day, aerochamber with valve, PRN: for wheezing, 17 g, 2 Refill(s) ; Ordering Provider:   Jack Mcfadden MD; Catalog Code:   albuterol ; Order Dt/Tm:   1/28/2021 11:50:06 AM CST          amitriptyline  :   amitriptyline ; Status:   Prescribed ; Ordered As Mnemonic:   amitriptyline 10 mg oral tablet ; Simple Display Line:   20 mg, 2 tab(s), po, hs, fill when needed, 190 tab(s), 3 Refill(s) ; Ordering Provider:   Jack Mcfadden MD; Catalog Code:   amitriptyline ; Order Dt/Tm:   1/28/2021 11:49:21 AM CST          EPINEPHrine   :   EPINEPHrine ; Status:   Prescribed ; Ordered As Mnemonic:   EpiPen 2-Wilver 0.3 mg injectable kit ; Simple Display Line:   0.3 mg, IM, Once, 1 EA, 1 Refill(s) ; Ordering Provider:   Jack Mcfadden MD; Catalog Code:   EPINEPHrine ; Order Dt/Tm:   1/23/2018 4:18:12 PM CST          fluticasone-salmeterol  :   fluticasone-salmeterol ; Status:   Prescribed ; Ordered As Mnemonic:   Advair Diskus 100 mcg-50 mcg inhalation powder ; Simple Display Line:   1 puff(s), inh, bid, fill when needed, 60 EA, 11 Refill(s) ; Ordering Provider:   Jack Mcfadden MD; Catalog Code:   fluticasone-salmeterol ; Order Dt/Tm:   1/28/2021 11:49:53 AM CST          fluticasone nasal  :   fluticasone nasal ; Status:   Prescribed ; Ordered As Mnemonic:   fluticasone 50 mcg/inh nasal spray ; Simple Display Line:   2 spray(s), nasal, daily, fill when needed, 3 EA, 4 Refill(s) ; Ordering Provider:   Silvia Howell PA-C; Catalog Code:   fluticasone nasal ; Order Dt/Tm:   3/10/2020 2:55:06 PM CDT          hyoscyamine  :   hyoscyamine ; Status:   Prescribed ; Ordered As Mnemonic:   hyoscyamine 0.125 mg oral tablet ; Simple Display Line:   0.125 mg, 1 tab(s), PO, QID, fill when needed, PRN: for loose stool, 10 tab(s), 3 Refill(s) ; Ordering Provider:   Jack Mcfadden MD; Catalog Code:   hyoscyamine ; Order Dt/Tm:   1/28/2021 11:48:43 AM CST          mometasone nasal  :   mometasone nasal ; Status:   Prescribed ; Ordered As Mnemonic:   Nasonex 50 mcg/inh nasal spray ; Simple Display Line:   2 spray(s), Nasal, daily, PRN: for allergy symptoms, 3 EA, 3 Refill(s) ; Ordering Provider:   Jack Mcfadden MD; Catalog Code:   mometasone nasal ; Order Dt/Tm:   1/28/2021 11:50:14 AM CST          pantoprazole  :   pantoprazole ; Status:   Prescribed ; Ordered As Mnemonic:   pantoprazole 40 mg oral delayed release tablet ; Simple Display Line:   1 tab(s), Oral, daily, fill when needed, 95 tab(s), 3 Refill(s) ; Ordering Provider:   Jack Mcfadden MD; Catalog  Code:   pantoprazole ; Order Dt/Tm:   1/28/2021 11:49:06 AM CST            Social History   Social History   (As Of: 10/20/2021 10:49:26 AM CDT)   Alcohol:        Quit in 1975   (Last Updated: 1/29/2021 10:27:51 AM CST by Peggy Rose)          Tobacco:        Never   (Last Updated: 6/25/2014 4:27:30 PM CDT by Myla Tyler CMA)   Never (less than 100 in lifetime)   (Last Updated: 1/28/2021 11:03:00 AM CST by Sandra Felix CMA)          Electronic Cigarette/Vaping:        Electronic Cigarette Use: Never.   (Last Updated: 1/28/2021 11:03:03 AM CST by Sandra Felix CMA)          Substance Abuse:        Never   (Last Updated: 6/25/2014 4:27:45 PM CDT by Myla Tyler CMA)          Employment/School:        Retired, Work/School description: Ag teacher, [employer]..   (Last Updated: 1/29/2021 10:29:33 AM CST by Peggy Rose)          Home/Environment:        Marital status: Single.  Living situation: Home/Independent.  Injuries/Abuse/Neglect in household: No.  Feels unsafe at home: No.  Family/Friends available for support: Yes.   (Last Updated: 1/29/2021 10:29:24 AM CST by Peggy Rose)          Nutrition/Health:        Diet: Low fat.  Sleeping concerns: No.  Feels highly stressed: No.   (Last Updated: 1/29/2021 10:28:30 AM CST by Peggy Rose)          Exercise:  Occasional exercise      Exercise frequency: 3-4 times/week.  Exercise type: Bicycling, Walking, Weight lifting.   (Last Updated: 1/29/2021 10:28:15 AM CST by Peggy Rose)          Sexual:        Sexually active: No.   Comments:  7/30/2010 9:33 AM - Jessica Vu: .   (Last Updated: 7/30/2010 9:33:28 AM CDT by Jessica Vu)

## 2022-02-15 NOTE — TELEPHONE ENCOUNTER
---------------------  From: Sandra Felix CMA (Phone Messages Pool ([id number]))   To: [facility] Message Pool ([id number]);     Sent: 5/28/2019 2:40:18 PM CDT  Subject: diverticulitis flare up     Phone message    PCP: KVNG      Person calling: María Elena  Phone number: [phone]  Time message left: 1230  Return call time: 1437    Reason: María Elena called and left a message stating that she thinks she is having a diverticulitis flare up, she is having the pain in LLQ again the same that she had back on 4/6/19. She is wondering if [facility] will send in medication or if she needs a visit. Please advise.     LOV: 4/6/19 diverticulitis with KWL   Cipro 500 mg oral tablet: = 1 tab(s) ( 500 mg ), PO, q12hr, # 20 tab(s), ,x10 day(s)   metroNIDAZOLE 500 mg oral tablet: = 1 tab(s) ( 500 mg ), PO, q8hr, # 30 tab(s) x10 day(s).      1437 Called and LVM notifying pt that LORIM not in clinic today that message will be sen to him to address tomorrow and that he may or may not want her to come in for a visit, and if she cant wait for message to be address clinic is open until 8 pm for evaluation.         Transferred to:             EDWARD Felix CMA---------------------  From: Sandra Felix CMA ([facility] Message Pool ([id number]_[facility]))   To: Jack Mcfadden MD;     Sent: 5/29/2019 11:52:25 AM CDT  Subject: FW: diverticulitis flare up---------------------  From: Jack Mcfadden MD   To: [facility] Message Pool ([id number]_[place]);     Sent: 5/29/2019 11:59:57 AM CDT  Subject: RE: diverticulitis flare up     Have her come in and see me today or tomorrow---------------------  From: Jack Mcfadden MD   To: Sandra Felix CMA;     Sent: 5/29/2019 12:00:16 PM CDT  Subject: RE: diverticulitis flare upCalled and LVM notifying her that GJ would like to see her and that she can be worked into schedule today or tomorrow      EDWARD Felix CMAPt calling back at 1844. She would like to come in between 1-330 tomorrow. She'll come in at 1300  work in with KVNG. Transferred to scheduling.

## 2022-02-15 NOTE — TELEPHONE ENCOUNTER
---------------------  From: Jack Mcfadden MD   To: MARÍA ELENA ESPARZA FALGUNI    Sent: 1/23/2021 3:12:01 PM CST  Subject: Labs     Dear María Elena    Prediabetes is stable  Cholesterol slightly improved.      Results:  Date Result Name Ind Value Ref Range   1/22/2021 12:15 PM Glucose Level ((H)) 106 mg/dL (65 - 99)   1/22/2021 12:15 PM Cholesterol ((H)) 255 mg/dL ( - <200)   1/22/2021 12:15 PM Non-HDL Cholesterol ((H)) 203 ( - <130)   1/22/2021 12:15 PM HDL  52 mg/dL (> OR = 50 - )   1/22/2021 12:15 PM Cholesterol/HDL Ratio  4.9 ( - <5.0)   1/22/2021 12:15 PM LDL ((H)) 176    1/22/2021 12:15 PM Triglyceride  132 mg/dL ( - <150)     Gerald Mcfadden MD

## 2022-02-15 NOTE — NURSING NOTE
Comprehensive Intake Entered On:  2021 11:54 AM CDT    Performed On:  2021 11:44 AM CDT by Minoo Lal               Summary   Chief Complaint :   Shoulder pain on the right side.  Lifted something heavy.  Fell into truck.    Advance Directive :   No   Menstrual Status :   Hysterectomy   Height Measured :   61 in(Converted to: 5 ft 1 in, 154.94 cm)    Ht/Wt Measurement Refused by Patient? :   Yes   Systolic Blood Pressure :   126 mmHg   Diastolic Blood Pressure :   78 mmHg   Mean Arterial Pressure :   94 mmHg   Peripheral Pulse Rate :   60 bpm   BP Site :   Right arm   Pulse Site :   Radial artery   BP Method :   Manual   HR Method :   Manual   Temperature Tympanic :   97 DegF(Converted to: 36.1 DegC)  (LOW)    Race :      Languages :   English   Ethnicity :   Not  or    Minoo Lal - 2021 11:44 AM CDT   Health Status   Allergies Verified? :   Yes   Medication History Verified? :   Yes   Medical History Verified? :   No   Pre-Visit Planning Status :   Not completed   Minoo Lal - 2021 11:44 AM CDT   Demographics   Last Name :   Fish   Address :   [address] [place] [address]   First Name :   María Elena Polk Initial :   j   Responsible Party Date of Birth () :   1955 CDT   City :   [place]   State :   [place]   Zip Code :   [address]   Minoo Lal  2021 11:44 AM CDT   Ancillary Services Grid   Name :    [facility]           Location :    [place]                Minoo Lal  2021 11:44 AM CDT  Minoo Lal 2021 11:44 AM CDT        Consents   Consent for Immunization Exchange :   Consent Granted   Consent for Immunizations to Providers :   Consent Granted   Minoo Lal - 2021 11:44 AM CDT   Meds / Allergies   (As Of: 2021 11:54:22 AM CDT)   Allergies (Active)   Augmentin  Estimated Onset Date:   Unspecified ; Reactions:   hives ; Created By:   Myla Tyler CMA; Reaction Status:   Active ; Category:   Drug ;  Substance:   Augmentin ; Type:   Allergy ; Updated By:   Myla Tyler CMA; Reviewed Date:   4/12/2021 11:54 AM CDT      doxycycline  Estimated Onset Date:   <not entered> 8/1/1997 ; Reactions:   Hives ; Created By:   Belen Adams; Reaction Status:   Active ; Category:   Drug ; Substance:   doxycycline ; Type:   Allergy ; Updated By:   Belen Adams; Source:   Paper Chart ; Reviewed Date:   4/12/2021 11:54 AM CDT      Zithromax  Estimated Onset Date:   Unspecified ; Reactions:   nausea, diarrhea ; Created By:   Nicole Spicer CMA; Reaction Status:   Active ; Category:   Drug ; Substance:   Zithromax ; Type:   Allergy ; Updated By:   Nicole Spicer CMA; Source:   Patient ; Reviewed Date:   4/12/2021 11:54 AM CDT        Medication List   (As Of: 4/12/2021 11:54:22 AM CDT)   Prescription/Discharge Order    albuterol  :   albuterol ; Status:   Prescribed ; Ordered As Mnemonic:   albuterol 90 mcg/inh inhalation aerosol ; Simple Display Line:   2 puff(s), INH, 1-4x/day, aerochamber with valve, PRN: for wheezing, 17 g, 2 Refill(s) ; Ordering Provider:   Jack Mcfadden MD; Catalog Code:   albuterol ; Order Dt/Tm:   1/28/2021 11:50:06 AM CST          amitriptyline  :   amitriptyline ; Status:   Prescribed ; Ordered As Mnemonic:   amitriptyline 10 mg oral tablet ; Simple Display Line:   20 mg, 2 tab(s), po, hs, fill when needed, 190 tab(s), 3 Refill(s) ; Ordering Provider:   Jack Mcfadden MD; Catalog Code:   amitriptyline ; Order Dt/Tm:   1/28/2021 11:49:21 AM CST          ciprofloxacin  :   ciprofloxacin ; Status:   Prescribed ; Ordered As Mnemonic:   Cipro 500 mg oral tablet ; Simple Display Line:   500 mg, 1 tab(s), PO, q12hr, for 10 day(s), 20 tab(s), 0 Refill(s) ; Ordering Provider:   Jack Mcfadden MD; Catalog Code:   ciprofloxacin ; Order Dt/Tm:   1/28/2021 11:48:49 AM CST          EPINEPHrine  :   EPINEPHrine ; Status:   Prescribed ; Ordered As Mnemonic:   EpiPen 2-Wilver 0.3 mg injectable  kit ; Simple Display Line:   0.3 mg, IM, Once, 1 EA, 1 Refill(s) ; Ordering Provider:   Jack Mcfadden MD; Catalog Code:   EPINEPHrine ; Order Dt/Tm:   1/23/2018 4:18:12 PM CST          fluticasone-salmeterol  :   fluticasone-salmeterol ; Status:   Prescribed ; Ordered As Mnemonic:   Advair Diskus 100 mcg-50 mcg inhalation powder ; Simple Display Line:   1 puff(s), inh, bid, fill when needed, 60 EA, 11 Refill(s) ; Ordering Provider:   Jack Mcfadden MD; Catalog Code:   fluticasone-salmeterol ; Order Dt/Tm:   1/28/2021 11:49:53 AM CST          fluticasone nasal  :   fluticasone nasal ; Status:   Suspended ; Ordered As Mnemonic:   fluticasone 50 mcg/inh nasal spray ; Simple Display Line:   2 spray(s), nasal, daily, fill when needed, 3 EA, 4 Refill(s) ; Ordering Provider:   Jack Mcfadden MD; Catalog Code:   fluticasone nasal ; Order Dt/Tm:   3/10/2020 2:55:06 PM CDT          hyoscyamine  :   hyoscyamine ; Status:   Prescribed ; Ordered As Mnemonic:   hyoscyamine 0.125 mg oral tablet ; Simple Display Line:   0.125 mg, 1 tab(s), PO, QID, fill when needed, PRN: for loose stool, 10 tab(s), 3 Refill(s) ; Ordering Provider:   Jack Mcfadden MD; Catalog Code:   hyoscyamine ; Order Dt/Tm:   1/28/2021 11:48:43 AM CST          metroNIDAZOLE  :   metroNIDAZOLE ; Status:   Prescribed ; Ordered As Mnemonic:   metroNIDAZOLE 500 mg oral tablet ; Simple Display Line:   500 mg, 1 tab(s), PO, q8hr, for 10 day(s), 30 tab(s), 0 Refill(s) ; Ordering Provider:   Jack Mcfadden MD; Catalog Code:   metroNIDAZOLE ; Order Dt/Tm:   1/28/2021 11:48:57 AM CST          mometasone nasal  :   mometasone nasal ; Status:   Prescribed ; Ordered As Mnemonic:   Nasonex 50 mcg/inh nasal spray ; Simple Display Line:   2 spray(s), Nasal, daily, PRN: for allergy symptoms, 3 EA, 3 Refill(s) ; Ordering Provider:   Jack Mcfadden MD; Catalog Code:   mometasone nasal ; Order Dt/Tm:   1/28/2021 11:50:14 AM CST          pantoprazole  :   pantoprazole ;  Status:   Prescribed ; Ordered As Mnemonic:   pantoprazole 40 mg oral delayed release tablet ; Simple Display Line:   1 tab(s), Oral, daily, fill when needed, 95 tab(s), 3 Refill(s) ; Ordering Provider:   Jack Mcfadden MD; Catalog Code:   pantoprazole ; Order Dt/Tm:   1/28/2021 11:49:06 AM CST            ID Risk Screen   Recent Travel History :   No recent travel   Family Member Travel History :   No recent travel   Other Exposure to Infectious Disease :   Unknown   COVID-19 Testing Status :   No positive COVID-19 test   Minoo Lal - 4/12/2021 11:44 AM CDT   Social History   Social History   (As Of: 4/12/2021 11:54:22 AM CDT)   Alcohol:        Quit in 1975   (Last Updated: 1/29/2021 10:27:51 AM CST by Peggy Rose)          Tobacco:        Never   (Last Updated: 6/25/2014 4:27:30 PM CDT by Myla Tyler CMA)   Never (less than 100 in lifetime)   (Last Updated: 1/28/2021 11:03:00 AM CST by Sandra Felix CMA)          Electronic Cigarette/Vaping:        Electronic Cigarette Use: Never.   (Last Updated: 1/28/2021 11:03:03 AM CST by Sandra Felix CMA)          Substance Abuse:        Never   (Last Updated: 6/25/2014 4:27:45 PM CDT by Myla Tyler CMA)          Employment/School:        Retired, Work/School description: Ag teacher, [employer]..   (Last Updated: 1/29/2021 10:29:33 AM CST by Peggy Rose)          Home/Environment:        Marital status: Single.  Living situation: Home/Independent.  Injuries/Abuse/Neglect in household: No.  Feels unsafe at home: No.  Family/Friends available for support: Yes.   (Last Updated: 1/29/2021 10:29:24 AM CST by Peggy Rose)          Nutrition/Health:        Diet: Low fat.  Sleeping concerns: No.  Feels highly stressed: No.   (Last Updated: 1/29/2021 10:28:30 AM CST by Peggy Rose)          Exercise:  Occasional exercise      Exercise frequency: 3-4 times/week.  Exercise type: Bicycling, Walking, Weight lifting.   (Last Updated: 1/29/2021  10:28:15 AM CST by Peggy Rose)          Sexual:        Sexually active: No.   Comments:  7/30/2010 9:33 AM - Jessica Vu: .   (Last Updated: 7/30/2010 9:33:28 AM CDT by Jessica Vu)

## 2022-02-15 NOTE — TELEPHONE ENCOUNTER
---------------------  From: Iwona Espinosa CMA (eRx Pool ([id number]_[facility]))   To: [facility] Message Pool ([id number]_[place]);     Sent: 2/20/2020 7:29:57 AM CST  Subject: FW: Medication Management   Due Date/Time: 2/21/2020 2:48:00 AM CST           ------------------------------------------  From: [facility]/pharmacy #[id number]  To: Jack Mcfadden MD  Sent: February 20, 2020 2:48:34 AM CST  Subject: Medication Management  Due: February 21, 2020 2:48:34 AM CST    ** On Hold Pending Signature **  Drug: pantoprazole (pantoprazole 40 mg oral delayed release tablet)  TAKE 1 TABLET BY MOUTH ONCE DAILY  Quantity: 90 tab(s)  Days Supply: 90  Refills: 3  RAVEN  Notes from Pharmacy:     Dispensed Drug: pantoprazole (pantoprazole 40 mg oral delayed release tablet)  TAKE 1 TABLET BY MOUTH ONCE DAILY  Quantity: 90 tab(s)  Days Supply: 90  Refills: 3  RAVEN  Notes from Pharmacy:   ------------------------------------------Pt seen  in clinic 2/20/20 will refill during visit      EDWARD Felix CMA---------------------  From: Sandra Felix CMA   To: [facility]/pharmacy #[id number]    Sent: 2/20/2020 9:39:03 AM CST  Subject: FW: Medication Management     ** Not Approved: Patient needs appointment, will refill during apt today **  pantoprazole (PANTOPRAZOLE SOD DR 40 MG TAB)  TAKE 1 TABLET BY MOUTH ONCE DAILY  Qty:  90 tab(s)        Days Supply:  90        Refills:  3          RAVEN     Route To Pharmacy - [facility]/pharmacy #[id number]   Signed by Sandra Felix CMA

## 2022-02-15 NOTE — TELEPHONE ENCOUNTER
---------------------  From: Iwona Espinosa CMA (eRx Pool ([id number]_[facility]))   To: [facility] Message Pool ([id number]_[place]);     Sent: 3/6/2020 7:50:38 AM CST  Subject: FW: Medication Management   Due Date/Time: 3/7/2020 1:41:00 AM CST           ------------------------------------------  From: [facility]/pharmacy #[id number]  To: Jack Mcfadden MD  Sent: March 6, 2020 1:41:37 AM CST  Subject: Medication Management  Due: March 7, 2020 1:41:37 AM CST    ** On Hold Pending Signature **  Drug: amitriptyline (amitriptyline 10 mg oral tablet)  TAKE 2 TABLETS BY MOUTH AT BEDTIME  Quantity: 180 tab(s)  Days Supply: 90  Refills: 4  Substitutions Allowed  Notes from Pharmacy:     Dispensed Drug: amitriptyline (amitriptyline 10 mg oral tablet)  TAKE 2 TABLETS BY MOUTH AT BEDTIME  Quantity: 180 tab(s)  Days Supply: 90  Refills: 4  Substitutions Allowed  Notes from Pharmacy:   ------------------------------------------Pt has apt with LORI on 3/10/20 will refill medication at apt---------------------  From: Sandra Felix CMA   To: [facility]/pharmacy #[id number]    Sent: 3/8/2020 9:47:11 AM CDT  Subject: FW: Medication Management     ** Not Approved: Patient needs appointment, pt has apt on 3/10/20 will refill medication then **  amitriptyline (AMITRIPTYLINE HCL 10 MG TAB)  TAKE 2 TABLETS BY MOUTH AT BEDTIME  Qty:  180 tab(s)        Days Supply:  90        Refills:  4          Substitutions Allowed     Route To Pharmacy - [facility]/pharmacy #[id number]   Signed by Sandra Felix CMA

## 2022-02-15 NOTE — LETTER
(Inserted Image. Unable to display)   February 12, 2020        ANDRADE ESPARZA  [address] [place], [place] [id number]        Dear ANDRADE,      Thank you for selecting [facility] for your healthcare needs.    Our records indicate you are due for the following services:     Fasting Lab Tests ~ Please do not eat or drink anything 10 hours prior to your scheduled appointment time.  (Water and any medications that you may need are allowed unless directed otherwise.)    If you had your labs done at another facility or with Direct Access Lab Testing at [facility], please bring in a copy of the results to your next visit, mail a copy, or drop off a copy of your results to your Healthcare Provider.    To schedule an appointment or if you have further questions, please contact your primary clinic:   [facility]       [phone]   [facility]       [phone]              [facility]     [phone]      Powered by [employer]    Sincerely,    Jack Mcfadden M.D.

## 2022-02-15 NOTE — PROGRESS NOTES
Chief Complaint    SHOULDER PAIN - Has gotten worse since last here.  History of Present Illness       Right shoulder pain started 2-3 weeks ago lifting a box of miscellaneous stuff. Had a hard time tolerating Advil with her stomach. Has done some muscle strengthening exercises. Pain woke her up at 4am this morning.  Did some raking. Removed some cement blocks.  Review of Systems       No numbness or tingling       Maybe some weakness  Physical Exam   Vitals & Measurements    HR: 82 (Peripheral)  BP: 112/72     HT: 61 in  WT: 253 lb  BMI: 47.8        General: No acute distress       Musculoskeletal: Normal gait.  Some decreased strength with the right rotator cuff muscles.  Neer's and Guzman tests are negative.  Speeds and Yergason's maneuver are negative.       Skin: No bruising or swelling  Assessment/Plan       Right shoulder pain: We will check an MRI to make sure there is no rotator cuff tear.  If no rotator cuff tear then consider physical therapy.  Patient Information     Name:ANDRADE ESPARZA      Address:      [address] [place] [phone]     Sex:Female     YOB: 1955     [phone]     Emergency Contact:TOMASA HANSON     MRN:[id number]     FIN:[id number]     Location:[facility]     Date of Service:04/26/2021      Primary Care Physician:       Jack Mcfadden MD, [phone]      Attending Physician:       Jack Mcfadden MD, [phone]  Problem List/Past Medical History    Ongoing     Allergic asthma       Comments: fall/spring- mild persistant     Cholecystitis     Dyslipidemia     GERD (gastroesophageal reflux disease)     History of adenomatous polyp of colon     IBS (irritable bowel syndrome)     Obese     Osteopenia     Prediabetes    Historical     Chicken pox  Medications    Advair Diskus 100 mcg-50 mcg inhalation powder, 1 puff(s), Inhale, bid, 11 refills    albuterol 90 mcg/inh inhalation aerosol, 2 puff(s), Inhale, 1-4x/day, PRN, 2 refills     amitriptyline 10 mg oral tablet, 20 mg= 2 tab(s), Oral, hs, 3 refills    Cipro 500 mg oral tablet, 500 mg= 1 tab(s), Oral, q12 hrs    EpiPen 2-Wilver 0.3 mg injectable kit, 0.3 mg, IM, once, 1 refills    hyoscyamine 0.125 mg oral tablet, 0.125 mg= 1 tab(s), Oral, qid, PRN, 3 refills    metroNIDAZOLE 500 mg oral tablet, 500 mg= 1 tab(s), Oral, q8 hrs    Nasonex 50 mcg/inh nasal spray, 2 spray(s), Nasal, daily, PRN, 3 refills    pantoprazole 40 mg oral delayed release tablet, 1 tab(s), Oral, daily, 3 refills  Allergies    Augmentin (hives)    Zithromax (nausea, diarrhea)    doxycycline (Hives)

## 2022-02-15 NOTE — PROGRESS NOTES
Patient:   ANDRADE ESPARZA            MRN: [id number]            FIN: [id number]               Age:   61 years     Sex:  Female     :  1955   Associated Diagnoses:   Viral URI   Author:   Jack Mcfadden MD      Visit Information      Date of Service: 2017 10:47 am  Performing Location: [facility]  Encounter#: [id number]      Primary Care Provider (PCP):  Jack Mcfadden MD    NPI# [id number]      Referring Provider:  No referring provider recorded for selected visit.      Chief Complaint   2017 10:52 AM CST    URI sx sarted Sat.        History of Present Illness   Laryngitis with productive coughing for the past 6 days. This morning left eye crusted shut. 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 Denies sinus pressure. Coughing increased at night. Has minimal post nasal drainage.      Review of Systems   Constitutional:  Chills, Sweats, No fever.    Gastrointestinal:  No vomiting.    Integumentary:  No rash.       Health Status   Allergies:    Allergic Reactions (Selected)  Severity Not Documented  Augmentin (Hives)  Doxycycline (Hives)  Zithromax (Nausea, diarrhea)   Medications:  (Selected)   Prescriptions  Prescribed  Advair Diskus 100 mcg-50 mcg inhalation powder: 1 puff(s), inh, bid, Instructions: give 3 month supply, # 60 EA, 11 Refill(s), Type: Maintenance, Pharmacy: [facility] #, 1 puff(s) inh bid,Instr:give 3 month supply  EpiPen 2-Wilver 0.3 mg injectable kit: ( 0.3 mg ), IM, Once, # 1 EA, 1 Refill(s), Type: Soft Stop, Pharmacy: [facility] #, 0.3 mg im once  Protonix 40 mg oral delayed release tablet: 1 tab(s) ( 40 mg ), po, daily, # 95 tab(s), 0 Refill(s), Type: Maintenance, Pharmacy: [id number], 1 tab(s) po daily  albuterol CFC free 90 mcg/inh inhalation aerosol: 2 puff(s), INH, 1-4x/day, Instructions: aerochamber with valve, PRN: for wheezing, # 17 g, 2 Refill(s), Type: Maintenance, Pharmacy: [id number], 2 puff(s) inh 1-4x/day,PRN:for  wheezing,Instr:aerochamber with valve  amitriptyline 10 mg oral tablet: 2 tab(s) ( 20 mg ), po, hs, # 190 tab(s), 3 Refill(s), Type: Maintenance, Pharmacy: [id number], 2 tab(s) po hs  fluticasone 50 mcg/inh nasal spray: 2 spray(s), nasal, daily, # 3 EA, 4 Refill(s), Type: Maintenance, Pharmacy: [id number], 2 spray(s) nasal daily  hyoscyamine 0.125 mg oral tablet: 1 tab(s) ( 0.125 mg ), PO, QID, PRN: for loose stool, # 50 tab(s), 3 Refill(s), Type: Maintenance, Pharmacy: [id number], 1 tab(s) po qid,PRN:for loose stool   Problem list:    All Problems  Allergic Asthma / ICD-9-.90 / Confirmed  Tubulovillous adenoma / SNOMED CT 16343740 / Confirmed  Cholecystitis / ICD-9-.10 / Confirmed  Dyslipidemia / ICD-9-.4 / Confirmed  GERD (Gastroesophageal Reflux Disease) / ICD-9-.81 / Confirmed  IBS (Irritable Bowel Syndrome) / ICD-9-.1 / Confirmed  Obese / ICD-9-.00 / Probable  Osteopenia / ICD-9-.90 / Confirmed  Prediabetes / ICD-9-.29 / Confirmed  Resolved: Chicken Pox / ICD-9-.9      Histories   Procedure history:    DEXA - Dual energy X-ray photon absorptiometry (SNOMED CT 404318668) on 3/25/2016 at 60 Years.  Comments:  3/25/2016 8:07 AM - Alpa Washington  Osteopenia stable  Colonoscopy (SNOMED CT 875882090) performed by Jack Mcfadden MD on 8/21/2014 at 59 Years.  Comments:  8/28/2014 5:52 PM - Myla Tyler CMA  Polyps x2 - random bx    8/28/2014 5:49 PM - Myla Tyler CMA  Indication: screening  Sedation: MAC  Tubulovillous adenoma x2  Repeat in 3 years  DEXA on 8/2/2010 at 55 Years.  Comments:  8/5/2010 10:25 AM - Rashawn DELATORRE, Jessica  Osteopenia, slightly worse. Repeat 3-5 yrs.  Left Bunionectomy in the month of 12/2008 at 53 Years.  Coronary angiogram (SNOMED CT 74495334) in the month of 8/2007 at 52 Years.  Cardiovascular stress testing (SNOMED CT 988720430) in the month of 3/2007 at 51 Years.  Colonoscopy  normal-recheck 2014 (SNOMED CT 909021023) performed by Jack Mcfadden MD in the month of 8/2004 at 49 Years.  Comments:  7/30/2010 9:29 AM - Rashawn DELATORRE, Jessica Mckeon. Repeat 10 yrs. Due 2014.  Arthroscopy of left shoulder in the month of 9/2003 at 48 Years.  Cardiovascular stress testing (SNOMED CT 122284700) on 7/10/2003 at 47 Years.  Esophagogastroduodenoscopy (SNOMED CT 332660871) in the month of 7/2003 at 47 Years.  LAVH - Laparoscopy assisted vaginal hysterectomy (SNOMED CT 8157891719) performed by Gregory Garcia MD in the month of 2/2003 at 47 Years.  Cholecystectomy (SNOMED CT 66188257) in 2000 at 45 Years.  laparoscopy-removal uterine fibroid on 11/24/1997 at 42 Years.  right ovarian cystectomy.   Social History: Teaching      Physical Examination   Vital Signs   1/5/2017 10:52 AM CST Temperature Tympanic 99.9 DegF    Peripheral Pulse Rate 101 bpm  HI    Systolic Blood Pressure 107 mmHg    Diastolic Blood Pressure 60 mmHg      General:  Alert and oriented, No acute distress.    HENT:  No pharyngeal erythema.    Neck:  No lymphadenopathy.    Respiratory:  Lungs are clear to auscultation.    Cardiovascular:  Normal rate, Regular rhythm.    Musculoskeletal:  Normal gait.       Impression and Plan   Diagnosis     Viral URI ([id number] J06.9).     Likely viral syndrome and treat symptomatically. Discussed trying mucinex. Follow up if not improving

## 2022-02-15 NOTE — TELEPHONE ENCOUNTER
---------------------  From: Betina Em MD   To: [facility] ([id number]);     Sent: 11/3/2020 11:52:11 AM CST  Subject: General Message     Please schedule patient for curbside testing today.  Thank you!  Betina Em, MDSCKettering Health Springfield

## 2022-02-15 NOTE — NURSING NOTE
Comprehensive Intake Entered On:  3/10/2020 2:37 PM CDT    Performed On:  3/10/2020 2:33 PM CDT by Marylu MURPHY Diane               Summary   Chief Complaint :   Patient presents with ongoing pain in right shoulder. States there is a clicking noise   Menstrual Status :   Hysterectomy   Weight Measured :   257 lb(Converted to: 257 lb 0 oz, 116.57 kg)    Brad Valero CMAhanie - 3/10/2020 2:33 PM CDT   Height Measured :   61 in(Converted to: 5 ft 1 in, 154.94 cm)      Body Mass Index :   48.55 kg/m2 (HI)      Body Surface Area :   2.24 m2   Brad Valero CMAhanie - 3/10/2020 3:01 PM CDT     Systolic Blood Pressure :   126 mmHg   Diastolic Blood Pressure :   83 mmHg (HI)    Mean Arterial Pressure :   97 mmHg   Peripheral Pulse Rate :   86 bpm   BP Site :   Right arm   BP Method :   Electronic   Temperature Tympanic :   97.4 DegF(Converted to: 36.3 DegC)  (LOW)    Race :      Languages :   English   Ethnicity :   Not  or    Diane Valero CMA - 3/10/2020 2:33 PM CDT   Health Status   Allergies Verified? :   Yes   Medication History Verified? :   Yes   Medical History Verified? :   Yes   Pre-Visit Planning Status :   Completed   Tobacco Use? :   Never smoker   Diane Valero CMA - 3/10/2020 2:33 PM CDT   Consents   Consent for Immunization Exchange :   Consent Granted   Consent for Immunizations to Providers :   Consent Granted   Marylu MURPHY Diane - 3/10/2020 2:33 PM CDT   Meds / Allergies   (As Of: 3/10/2020 2:37:13 PM CDT)   Allergies (Active)   Augmentin  Estimated Onset Date:   Unspecified ; Reactions:   hives ; Created By:   Myla Tyler CMA; Reaction Status:   Active ; Category:   Drug ; Substance:   Augmentin ; Type:   Allergy ; Updated By:   Myla Tyler CMA; Reviewed Date:   3/10/2020 2:34 PM CDT      doxycycline  Estimated Onset Date:   <not entered> 8/1/1997 ; Reactions:   Hives ; Created By:   Belen Adams; Reaction Status:   Active ; Category:   Drug ; Substance:   doxycycline  ; Type:   Allergy ; Updated By:   Belen Adams; Source:   Paper Chart ; Reviewed Date:   3/10/2020 2:34 PM CDT      Zithromax  Estimated Onset Date:   Unspecified ; Reactions:   nausea, diarrhea ; Created By:   Nicole Spicer CMA; Reaction Status:   Active ; Category:   Drug ; Substance:   Zithromax ; Type:   Allergy ; Updated By:   Nicole Spicer CMA; Source:   Patient ; Reviewed Date:   3/10/2020 2:34 PM CDT        Medication List   (As Of: 3/10/2020 2:37:13 PM CDT)   Prescription/Discharge Order    albuterol  :   albuterol ; Status:   Prescribed ; Ordered As Mnemonic:   albuterol 90 mcg/inh inhalation aerosol ; Simple Display Line:   2 puff(s), INH, 1-4x/day, aerochamber with valve, PRN: for wheezing, 17 g, 2 Refill(s) ; Ordering Provider:   Jack Mcfadden MD; Catalog Code:   albuterol ; Order Dt/Tm:   1/23/2018 4:16:09 PM CST          amitriptyline  :   amitriptyline ; Status:   Prescribed ; Ordered As Mnemonic:   amitriptyline 10 mg oral tablet ; Simple Display Line:   20 mg, 2 tab(s), po, hs, fill when needed, 190 tab(s), 3 Refill(s) ; Ordering Provider:   Jack Mcfadden MD; Catalog Code:   amitriptyline ; Order Dt/Tm:   2/4/2019 5:12:32 PM CST          ciprofloxacin  :   ciprofloxacin ; Status:   Prescribed ; Ordered As Mnemonic:   Cipro 500 mg oral tablet ; Simple Display Line:   500 mg, 1 tab(s), PO, q12hr, for 10 day(s), 20 tab(s), 1 Refill(s) ; Ordering Provider:   Jack Mcfadden MD; Catalog Code:   ciprofloxacin ; Order Dt/Tm:   5/30/2019 1:40:36 PM CDT          EPINEPHrine  :   EPINEPHrine ; Status:   Prescribed ; Ordered As Mnemonic:   EpiPen 2-Wilver 0.3 mg injectable kit ; Simple Display Line:   0.3 mg, IM, Once, 1 EA, 1 Refill(s) ; Ordering Provider:   Jack Mcfadden MD; Catalog Code:   EPINEPHrine ; Order Dt/Tm:   1/23/2018 4:18:12 PM CST          fluticasone-salmeterol  :   fluticasone-salmeterol ; Status:   Prescribed ; Ordered As Mnemonic:   Advair Diskus 100 mcg-50 mcg  inhalation powder ; Simple Display Line:   1 puff(s), inh, bid, fill when needed, 60 EA, 11 Refill(s) ; Ordering Provider:   Jack Mcfaddne MD; Catalog Code:   fluticasone-salmeterol ; Order Dt/Tm:   2/4/2019 5:11:18 PM CST          fluticasone nasal  :   fluticasone nasal ; Status:   Prescribed ; Ordered As Mnemonic:   fluticasone 50 mcg/inh nasal spray ; Simple Display Line:   2 spray(s), nasal, daily, fill when needed, 3 EA, 4 Refill(s) ; Ordering Provider:   Jack Mcfadden MD; Catalog Code:   fluticasone nasal ; Order Dt/Tm:   2/4/2019 5:12:56 PM CST          hyoscyamine  :   hyoscyamine ; Status:   Prescribed ; Ordered As Mnemonic:   hyoscyamine 0.125 mg oral tablet ; Simple Display Line:   0.125 mg, 1 tab(s), PO, QID, fill when needed, PRN: for loose stool, 10 tab(s), 3 Refill(s) ; Ordering Provider:   Jack Mcfadden MD; Catalog Code:   hyoscyamine ; Order Dt/Tm:   2/4/2019 5:12:45 PM CST          metroNIDAZOLE  :   metroNIDAZOLE ; Status:   Prescribed ; Ordered As Mnemonic:   metroNIDAZOLE 500 mg oral tablet ; Simple Display Line:   500 mg, 1 tab(s), PO, q8hr, for 10 day(s), 30 tab(s), 1 Refill(s) ; Ordering Provider:   Jack Mcfadden MD; Catalog Code:   metroNIDAZOLE ; Order Dt/Tm:   5/30/2019 1:40:45 PM CDT          pantoprazole  :   pantoprazole ; Status:   Prescribed ; Ordered As Mnemonic:   pantoprazole 40 mg oral delayed release tablet ; Simple Display Line:   1 tab(s), Oral, daily, fill when needed, 95 tab(s), 3 Refill(s) ; Ordering Provider:   Jack Mcfadden MD; Catalog Code:   pantoprazole ; Order Dt/Tm:   2/4/2019 5:12:10 PM CST

## 2022-02-15 NOTE — TELEPHONE ENCOUNTER
---------------------  From: Soniya Key (Phone Messages Pool ([id number]_[facility]))   To: "[employer] Message Pool ([id number] [facility] [place]);     Sent: 2/1/2021 2:59:13 PM CST  Subject: PA needed       PCP:   Bogdan      Time of Call:  1117       Person Calling:  pt  Phone number:  [phone]    Returned call at: _    Note:   Pt is needing a PA sent for Nasonex nasal spray. Can this be faxed over to [facility] pharmacy [phone].     Thank you  Soniya RN    Last office visit and reason:  _---------------------  From: Sandra Felix CMA ("[employer] Message Pool ([id number]_[facility]))   To: Jack Mcfadden MD;     Sent: 2/1/2021 3:05:47 PM CST  Subject: FW: PA needed     advise on if PA to be done or change medication---------------------  From: Jack Mcfadden MD   To: Sandra Felix CMA;     Sent: 2/1/2021 3:11:11 PM CST  Subject: RE: PA needed     Have her discuss with pharmacist an affordable alternative for her insuranceCalled and LVM notifying her that per pharmacy Nasonex is not covered by her insurance, but suggested that she   try OTC Nasacort, advised her to call back with any questions/concerns or to discuss medication.

## 2022-02-15 NOTE — NURSING NOTE
Asthma Control Test (ACT) Total Entered On:  3/11/2020 4:07 PM CDT    Performed On:  3/10/2020 4:07 PM CDT by Oneyda Lu               Asthma Control Test (ACT) Total   Asthma Control Test Total (Adult) :   23    Oneyda Lu - 3/11/2020 4:07 PM CDT

## 2022-02-15 NOTE — NURSING NOTE
Comprehensive Intake Entered On:  11/3/2020 11:38 AM CST    Performed On:  11/3/2020 11:36 AM CST by Myla Tyler CMA               Summary   Chief Complaint :   Verbal consent given for telephone visit. Allergy sx since April. Sx worsening over last week. Drainage, sinus pain, ST, ear soreness, nausea, diarrhea. Hx of diverticulitis. Has never been tested for COVID. No known exposures.    Menstrual Status :   Hysterectomy   Race :      Languages :   English   Ethnicity :   Not  or    Myla Tyler CMA - 11/3/2020 11:36 AM CST   Health Status   Allergies Verified? :   Yes   Medication History Verified? :   Yes   Pre-Visit Planning Status :   Not completed   Myla Tyler CMA - 11/3/2020 11:36 AM CST   Meds / Allergies   (As Of: 11/3/2020 11:38:29 AM CST)   Allergies (Active)   Augmentin  Estimated Onset Date:   Unspecified ; Reactions:   hives ; Created By:   Myla Tyler CMA; Reaction Status:   Active ; Category:   Drug ; Substance:   Augmentin ; Type:   Allergy ; Updated By:   Myla Tyler CMA; Reviewed Date:   8/31/2020 2:52 PM CDT      doxycycline  Estimated Onset Date:   <not entered> 8/1/1997 ; Reactions:   Hives ; Created By:   Belen Adams; Reaction Status:   Active ; Category:   Drug ; Substance:   doxycycline ; Type:   Allergy ; Updated By:   Belen Adams; Source:   Paper Chart ; Reviewed Date:   8/31/2020 2:52 PM CDT      Zithromax  Estimated Onset Date:   Unspecified ; Reactions:   nausea, diarrhea ; Created By:   Nicole Spicer CMA; Reaction Status:   Active ; Category:   Drug ; Substance:   Zithromax ; Type:   Allergy ; Updated By:   Nicole Spicer CMA; Source:   Patient ; Reviewed Date:   8/31/2020 2:52 PM CDT        Medication List   (As Of: 11/3/2020 11:38:29 AM CST)   Prescription/Discharge Order    albuterol  :   albuterol ; Status:   Prescribed ; Ordered As Mnemonic:   albuterol 90 mcg/inh inhalation aerosol ; Simple Display Line:   2  puff(s), INH, 1-4x/day, aerochamber with valve, PRN: for wheezing, 17 g, 2 Refill(s) ; Ordering Provider:   Jack Mcfadden MD; Catalog Code:   albuterol ; Order Dt/Tm:   3/10/2020 2:55:47 PM CDT          amitriptyline  :   amitriptyline ; Status:   Prescribed ; Ordered As Mnemonic:   amitriptyline 10 mg oral tablet ; Simple Display Line:   20 mg, 2 tab(s), po, hs, fill when needed, 190 tab(s), 3 Refill(s) ; Ordering Provider:   Jack Mcfadden MD; Catalog Code:   amitriptyline ; Order Dt/Tm:   3/10/2020 2:55:18 PM CDT          ciprofloxacin  :   ciprofloxacin ; Status:   Prescribed ; Ordered As Mnemonic:   Cipro 500 mg oral tablet ; Simple Display Line:   500 mg, 1 tab(s), PO, q12hr, for 10 day(s), 20 tab(s), 1 Refill(s) ; Ordering Provider:   Jack Mcfadden MD; Catalog Code:   ciprofloxacin ; Order Dt/Tm:   3/10/2020 2:55:00 PM CDT          EPINEPHrine  :   EPINEPHrine ; Status:   Prescribed ; Ordered As Mnemonic:   EpiPen 2-Wilver 0.3 mg injectable kit ; Simple Display Line:   0.3 mg, IM, Once, 1 EA, 1 Refill(s) ; Ordering Provider:   Jack Mcfadden MD; Catalog Code:   EPINEPHrine ; Order Dt/Tm:   1/23/2018 4:18:12 PM CST          fluticasone-salmeterol  :   fluticasone-salmeterol ; Status:   Prescribed ; Ordered As Mnemonic:   Advair Diskus 100 mcg-50 mcg inhalation powder ; Simple Display Line:   1 puff(s), inh, bid, fill when needed, 60 EA, 11 Refill(s) ; Ordering Provider:   Jack Mcfadden MD; Catalog Code:   fluticasone-salmeterol ; Order Dt/Tm:   3/10/2020 2:55:34 PM CDT          fluticasone nasal  :   fluticasone nasal ; Status:   Suspended ; Ordered As Mnemonic:   fluticasone 50 mcg/inh nasal spray ; Simple Display Line:   2 spray(s), nasal, daily, fill when needed, 3 EA, 4 Refill(s) ; Ordering Provider:   Jack Mcfadden MD; Catalog Code:   fluticasone nasal ; Order Dt/Tm:   3/10/2020 2:55:06 PM CDT          hyoscyamine  :   hyoscyamine ; Status:   Prescribed ; Ordered As Mnemonic:   hyoscyamine 0.125 mg  oral tablet ; Simple Display Line:   0.125 mg, 1 tab(s), PO, QID, fill when needed, PRN: for loose stool, 10 tab(s), 3 Refill(s) ; Ordering Provider:   Jack Mcfadden MD; Catalog Code:   hyoscyamine ; Order Dt/Tm:   3/10/2020 2:55:12 PM CDT          metroNIDAZOLE  :   metroNIDAZOLE ; Status:   Prescribed ; Ordered As Mnemonic:   metroNIDAZOLE 500 mg oral tablet ; Simple Display Line:   500 mg, 1 tab(s), PO, q8hr, for 10 day(s), 30 tab(s), 1 Refill(s) ; Ordering Provider:   Jack Mcfadden MD; Catalog Code:   metroNIDAZOLE ; Order Dt/Tm:   3/10/2020 2:54:54 PM CDT          mometasone nasal  :   mometasone nasal ; Status:   Prescribed ; Ordered As Mnemonic:   Nasonex 50 mcg/inh nasal spray ; Simple Display Line:   2 spray(s), Nasal, daily, PRN: for allergy symptoms, 3 EA, 3 Refill(s) ; Ordering Provider:   Jack Mcfadden MD; Catalog Code:   mometasone nasal ; Order Dt/Tm:   8/31/2020 3:14:50 PM CDT          pantoprazole  :   pantoprazole ; Status:   Prescribed ; Ordered As Mnemonic:   pantoprazole 40 mg oral delayed release tablet ; Simple Display Line:   1 tab(s), Oral, daily, fill when needed, 95 tab(s), 3 Refill(s) ; Ordering Provider:   Jack Mcfadden MD; Catalog Code:   pantoprazole ; Order Dt/Tm:   3/10/2020 2:55:24 PM CDT            ID Risk Screen   Recent Travel History :   No recent travel   Family Member Travel History :   No recent travel   Other Exposure to Infectious Disease :   Unknown   Myla Tyler CMA - 11/3/2020 11:36 AM CST

## 2022-02-15 NOTE — TELEPHONE ENCOUNTER
Entered by Janie Mcgee LPN on January 28, 2019 8:49:32 AM CST  ---------------------  From: Janie Mcgee LPN   To: [id number]    Sent: 1/28/2019 8:49:32 AM CST  Subject: Medication Management     ** Submitted: **  Order:pantoprazole (pantoprazole 40 mg oral delayed release tablet)  1 tab(s)  Oral  daily  Qty:  30 tab(s)        Refills:  0          RAVEN     Route To [facility] - [id number]    Signed by Janie Mcgee LPN  1/28/2019 8:48:00 AM    ** Submitted: **  Complete:pantoprazole (pantoprazole 40 mg oral delayed release tablet)   Signed by Janie Mcgee LPN  1/28/2019 8:49:00 AM    ** Not Approved:  **  pantoprazole (Pantoprazole Sodium Oral Tablet Delayed Release 40 MG)  TAKE ONE TABLET BY MOUTH ONE TIME DAILY  Qty:  90 tab(s)        Days Supply:  90        Refills:  2          RAVEN     Route To [facility] - [id number]   Signed by Janie Mcgee LPN            ------------------------------------------  From: [id number]  To: Jack Mcfadden MD  Sent: January 25, 2019 7:26:54 AM CST  Subject: Medication Management  Due: January 26, 2019 7:26:54 AM CST    ** On Hold Pending Signature **  Drug: pantoprazole (pantoprazole 40 mg oral delayed release tablet)  TAKE ONE TABLET BY MOUTH ONE TIME DAILY   Quantity: 95 tab(s)     Days Supply: 95        Refills: 2  Substitutions Allowed  Notes from Pharmacy:     Dispensed Drug: pantoprazole (pantoprazole 40 mg oral delayed release tablet)  TAKE ONE TABLET BY MOUTH ONE TIME DAILY   Quantity: 90 tab(s)     Days Supply: 90        Refills: 2  Substitutions Allowed  Notes from Pharmacy:   ------------------------------------------Date of last office visit and reason:  1/23/18 Medication Management w/[facility]      Date of last Med Check / Px:   1/23/18  Date of last labs pertaining to med:  1/17/18    RTC order in chart:  yes, sent 1/23/18 due now    For Protocol refill, has patient been contacted:  Sent 30 day supply to [facility] per protocol. Called  informed patient was due for visit - she expressed understanding and was transferred to scheduling to make appointment.

## 2022-02-15 NOTE — NURSING NOTE
Comprehensive Intake Entered On:  8/31/2020 2:56 PM CDT    Performed On:  8/31/2020 2:50 PM CDT by Sandra Felix CMA               Summary   Chief Complaint :   c/o ongoing allergies- itchy eyes, nasal congestion, post nasal drainage, scratchy throat   Menstrual Status :   Hysterectomy   Height Measured :   61 in(Converted to: 5 ft 1 in, 154.94 cm)    Ht/Wt Measurement Refused by Patient? :   Yes   Systolic Blood Pressure :   128 mmHg   Diastolic Blood Pressure :   86 mmHg (HI)    Mean Arterial Pressure :   100 mmHg   Peripheral Pulse Rate :   80 bpm   BP Site :   Right arm   Pulse Site :   Radial artery   BP Method :   Manual   HR Method :   Manual   Temperature Tympanic :   98.4 DegF(Converted to: 36.9 DegC)    Race :      Languages :   English   Ethnicity :   Not  or    Sandra Felix CMA - 8/31/2020 2:50 PM CDT   Health Status   Allergies Verified? :   Yes   Medication History Verified? :   Yes   Medical History Verified? :   No   Pre-Visit Planning Status :   Not completed   Tobacco Use? :   Never smoker   Sandra Felix CMA - 8/31/2020 2:50 PM CDT   Consents   Consent for Immunization Exchange :   Consent Granted   Consent for Immunizations to Providers :   Consent Granted   Sandra Felix CMA - 8/31/2020 2:50 PM CDT   Meds / Allergies   (As Of: 8/31/2020 2:56:16 PM CDT)   Allergies (Active)   Augmentin  Estimated Onset Date:   Unspecified ; Reactions:   hives ; Created By:   Myla Tyler CMA; Reaction Status:   Active ; Category:   Drug ; Substance:   Augmentin ; Type:   Allergy ; Updated By:   Myla Tyler CMA; Reviewed Date:   8/31/2020 2:52 PM CDT      doxycycline  Estimated Onset Date:   <not entered> 8/1/1997 ; Reactions:   Hives ; Created By:   Belen Adams; Reaction Status:   Active ; Category:   Drug ; Substance:   doxycycline ; Type:   Allergy ; Updated By:   Belen Adams; Source:   Paper Chart ; Reviewed Date:   8/31/2020 2:52 PM CDT      Zithromax   Estimated Onset Date:   Unspecified ; Reactions:   nausea, diarrhea ; Created By:   Nicole Spicer CMA; Reaction Status:   Active ; Category:   Drug ; Substance:   Zithromax ; Type:   Allergy ; Updated By:   Nicole Spicer CMA; Source:   Patient ; Reviewed Date:   8/31/2020 2:52 PM CDT        Medication List   (As Of: 8/31/2020 2:56:16 PM CDT)   Prescription/Discharge Order    albuterol  :   albuterol ; Status:   Prescribed ; Ordered As Mnemonic:   albuterol 90 mcg/inh inhalation aerosol ; Simple Display Line:   2 puff(s), INH, 1-4x/day, aerochamber with valve, PRN: for wheezing, 17 g, 2 Refill(s) ; Ordering Provider:   Jack Mcfadden MD; Catalog Code:   albuterol ; Order Dt/Tm:   3/10/2020 2:55:47 PM CDT          amitriptyline  :   amitriptyline ; Status:   Prescribed ; Ordered As Mnemonic:   amitriptyline 10 mg oral tablet ; Simple Display Line:   20 mg, 2 tab(s), po, hs, fill when needed, 190 tab(s), 3 Refill(s) ; Ordering Provider:   Jack Mcfadden MD; Catalog Code:   amitriptyline ; Order Dt/Tm:   3/10/2020 2:55:18 PM CDT          ciprofloxacin  :   ciprofloxacin ; Status:   Prescribed ; Ordered As Mnemonic:   Cipro 500 mg oral tablet ; Simple Display Line:   500 mg, 1 tab(s), PO, q12hr, for 10 day(s), 20 tab(s), 1 Refill(s) ; Ordering Provider:   Jack Mcfadden MD; Catalog Code:   ciprofloxacin ; Order Dt/Tm:   3/10/2020 2:55:00 PM CDT          EPINEPHrine  :   EPINEPHrine ; Status:   Prescribed ; Ordered As Mnemonic:   EpiPen 2-Wilver 0.3 mg injectable kit ; Simple Display Line:   0.3 mg, IM, Once, 1 EA, 1 Refill(s) ; Ordering Provider:   Jack Mcfadden MD; Catalog Code:   EPINEPHrine ; Order Dt/Tm:   1/23/2018 4:18:12 PM CST          fluticasone-salmeterol  :   fluticasone-salmeterol ; Status:   Prescribed ; Ordered As Mnemonic:   Advair Diskus 100 mcg-50 mcg inhalation powder ; Simple Display Line:   1 puff(s), inh, bid, fill when needed, 60 EA, 11 Refill(s) ; Ordering Provider:   Jack Mcfadden MD;  Catalog Code:   fluticasone-salmeterol ; Order Dt/Tm:   3/10/2020 2:55:34 PM CDT          fluticasone nasal  :   fluticasone nasal ; Status:   Prescribed ; Ordered As Mnemonic:   fluticasone 50 mcg/inh nasal spray ; Simple Display Line:   2 spray(s), nasal, daily, fill when needed, 3 EA, 4 Refill(s) ; Ordering Provider:   Jack Mcfadden MD; Catalog Code:   fluticasone nasal ; Order Dt/Tm:   3/10/2020 2:55:06 PM CDT          hyoscyamine  :   hyoscyamine ; Status:   Prescribed ; Ordered As Mnemonic:   hyoscyamine 0.125 mg oral tablet ; Simple Display Line:   0.125 mg, 1 tab(s), PO, QID, fill when needed, PRN: for loose stool, 10 tab(s), 3 Refill(s) ; Ordering Provider:   Jack Mcfadden MD; Catalog Code:   hyoscyamine ; Order Dt/Tm:   3/10/2020 2:55:12 PM CDT          metroNIDAZOLE  :   metroNIDAZOLE ; Status:   Prescribed ; Ordered As Mnemonic:   metroNIDAZOLE 500 mg oral tablet ; Simple Display Line:   500 mg, 1 tab(s), PO, q8hr, for 10 day(s), 30 tab(s), 1 Refill(s) ; Ordering Provider:   Jack Mcfadden MD; Catalog Code:   metroNIDAZOLE ; Order Dt/Tm:   3/10/2020 2:54:54 PM CDT          pantoprazole  :   pantoprazole ; Status:   Prescribed ; Ordered As Mnemonic:   pantoprazole 40 mg oral delayed release tablet ; Simple Display Line:   1 tab(s), Oral, daily, fill when needed, 95 tab(s), 3 Refill(s) ; Ordering Provider:   Jack Mcfadden MD; Catalog Code:   pantoprazole ; Order Dt/Tm:   3/10/2020 2:55:24 PM CDT            ID Risk Screen   Recent Travel History :   No recent travel   Family Member Travel History :   No recent travel   Other Exposure to Infectious Disease :   Unknown   Sandra Felix CMA - 8/31/2020 2:50 PM CDT

## 2022-02-15 NOTE — PROGRESS NOTES
Patient:   ANDRADE ESPARZA            MRN: [id number]            FIN: [id number]               Age:   63 years     Sex:  Female     :  1955   Associated Diagnoses:   Allergic Asthma; GERD (Gastroesophageal Reflux Disease); IBS (Irritable Bowel Syndrome); Musculoskeletal chest pain   Author:   Jack Mcfadden MD      Visit Information      Date of Service: 2019 04:18 pm  Performing Location: [facility]  Encounter#: [id number]      Primary Care Provider (PCP):  Jack Mcfadden MD    NPI# [id number]      Referring Provider:  Jack Mcfadden MD, NPI# [id number]      Chief Complaint   2019 4:27 PM CST     Medication management      History of Present Illness     IBS well controlled.  Asthma controlled with advair. Uses it twice daily but only once a day October - February. Did not go through an albuterol inhaler in the past year.    Rarely needs the hyoscyamine. Imodium helps.  Protonix helps acid reflux  Had a low stress year and IBS controlled. Probiotic has even helped.  Had pollen related anaphylactic reaction in past.    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 Feels she has ribs popping in and out place at time dependent on the amount of lifting and carrying. Pain decreases with pressing on chest. Happens couple times a month      Review of Systems   Respiratory:  asthma and allergic rhinitis are controlled with her medications; rarely needs albuterol inhaler.    Gastrointestinal:  abdominal cramping and diarrhea continues to be improved on amitriptyline, heartburn controlled by protonix.    Gynecologic:  no longer having hot flashes.    Neurologic:  tinnitus bilateral starting  but decreased with hearing aides.    Psychiatric:  PHQ-9: 1pt (); 4pts ().    ACT: 25pts  (February 2019). 24pts (January 2018). 25pts (January 2017)      Health Status   Allergies:    Allergic Reactions (Selected)  Severity Not Documented  Augmentin (Hives)  Doxycycline (Hives)  Zithromax (Nausea, diarrhea)   Medications:  (Selected)   Prescriptions  Prescribed  Advair Diskus 100 mcg-50 mcg inhalation powder: 1 puff(s), inh, bid, Instructions: give 1 month supply, # 60 EA, 0 Refill(s), Type: Maintenance, Pharmacy: [id number], due for appointment now, 1 puff(s) Inhale bid,Instr:give 1 month supply  EpiPen 2-Wilver 0.3 mg injectable kit: ( 0.3 mg ), IM, Once, # 1 EA, 1 Refill(s), Type: Soft Stop, Pharmacy: [id number], 0.3 mg im once  albuterol 90 mcg/inh inhalation aerosol: 2 puff(s), INH, 1-4x/day, Instructions: aerochamber with valve, PRN: for wheezing, # 17 g, 2 Refill(s), Type: Maintenance, Pharmacy: [id number], 2 puff(s) inh 1-4x/day,PRN:for wheezing,Instr:aerochamber with valve  amitriptyline 10 mg oral tablet: 2 tab(s) ( 20 mg ), po, hs, # 190 tab(s), 3 Refill(s), Type: Maintenance, Pharmacy: [id number], 2 tab(s) po hs  fluticasone 50 mcg/inh nasal spray: 2 spray(s), nasal, daily, # 3 EA, 4 Refill(s), Type: Maintenance, Pharmacy: [id number], 2 spray(s) nasal daily  hyoscyamine 0.125 mg oral tablet: 1 tab(s) ( 0.125 mg ), PO, QID, PRN: for loose stool, # 10 tab(s), 3 Refill(s), Type: Maintenance, Pharmacy: [id number], 1 tab(s) po qid,PRN:for loose stool  pantoprazole 40 mg oral delayed release tablet: = 1 tab(s), Oral, daily, # 30 tab(s), 0 Refill(s), RAVEN, Type: Maintenance, Pharmacy: [id number], Pt due for clinic visit.   Problem list:    All Problems  Allergic asthma / SNOMED CT 0141756219 / Confirmed  Cholecystitis / SNOMED CT 157398716 / Confirmed  Dyslipidemia / SNOMED CT 2258266801 / Confirmed  GERD (gastroesophageal reflux disease) / SNOMED CT 479507057 / Confirmed  History of adenomatous polyp of colon / SNOMED CT  3673946870 / Confirmed  IBS (irritable bowel syndrome) / SNOMED CT 08799770 / Confirmed  Obese / SNOMED CT 9389578988 / Probable  Osteopenia / SNOMED CT 626940445 / Confirmed  Prediabetes / SNOMED CT 6418492954 / Confirmed  Resolved: Chicken pox / SNOMED CT 43077902  Canceled: Tubulovillous adenoma / SNOMED CT 35614635      Histories   Procedure history:    DEXA - Dual energy X-ray photon absorptiometry (SNOMED CT 102343038) on 3/25/2016 at 60 Years.  Comments:  3/25/2016 8:07 AM CDT - Chris RIVERA-CAlpa  Osteopenia stable  Colonoscopy (SNOMED CT 978285652) performed by Jack Mcfadden MD on 8/21/2014 at 59 Years.  Comments:  8/28/2014 5:52 PM CDT Myla Wright CMA  Polyps x2 - random bx    8/28/2014 5:49 PM CDT - Myla Tyler CMA  Indication: screening  Sedation: MAC  Tubulovillous adenoma x2  Repeat in 3 years  DEXA on 8/2/2010 at 55 Years.  Comments:  8/5/2010 10:25 AM CDT - Rashawn RIVERA-CJessica  Osteopenia, slightly worse. Repeat 3-5 yrs.  Left Bunionectomy in the month of 12/2008 at 53 Years.  Coronary angiogram (SNOMED CT 35512111) in the month of 8/2007 at 52 Years.  Cardiovascular stress testing (SNOMED CT 015898016) in the month of 3/2007 at 51 Years.  Colonoscopy normal-recheck 2014 (SNOMED CT 316238887) performed by Jack Mcfadden MD in the month of 8/2004 at 49 Years.  Comments:  7/30/2010 9:29 AM CDT - Rashawn NP-C, Jessica  Normal. Repeat 10 yrs. Due 2014.  Arthroscopy of left shoulder in the month of 9/2003 at 48 Years.  Cardiovascular stress testing (SNOMED CT 201304871) on 7/10/2003 at 47 Years.  Esophagogastroduodenoscopy (SNOMED CT 898350742) in the month of 7/2003 at 47 Years.  LAVH - Laparoscopy assisted vaginal hysterectomy (SNOMED CT 0164252757) performed by Gregory Garcia MD in the month of 2/2003 at 47 Years.  Cholecystectomy (SNOMED CT 20197971) in 2000 at 45 Years.  laparoscopy-removal uterine fibroid on 11/24/1997 at 42 Years.  right ovarian cystectomy.  Colonoscopy (SNOMED  CT 738328347) performed by Jack Mcfadden MD.  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 [employer] since . Mom had lived with her and  . Has no immediate family  Family History: Father  heart attack; Mom  age 83. Brother  MVA      Physical Examination   Vital Signs   2019 4:27 PM CST Temperature Tympanic 97.7 DegF  LOW    Peripheral Pulse Rate 74 bpm    Pulse Site Radial artery    HR Method Manual    Systolic Blood Pressure 138 mmHg  HI    Diastolic Blood Pressure 80 mmHg    Mean Arterial Pressure 99 mmHg    BP Site Right arm    BP Method Manual      Measurements from flowsheet : Measurements   2019 4:27 PM CST Height Measured - Standard 60.5 in    Weight Measured - Standard 251.6 lb    BSA 2.2 m2    Body Mass Index 48.32 kg/m2  HI      General:  Alert and oriented.    Neck:  No lymphadenopathy.    Respiratory:  Lungs are clear to auscultation.    Cardiovascular:  Normal rate, Regular rhythm.    Musculoskeletal:  Normal gait.    Neurologic:  No focal deficits.    Psychiatric:  Appropriate mood & affect.       Review / Management   Documentation reviewed:  Calcium score of zero (May 2012).       Impression and Plan   Diagnosis     Allergic Asthma (GKN94-GY J45.909).     GERD (Gastroesophageal Reflux Disease) (HBJ12-FT K21.9).     IBS (Irritable Bowel Syndrome) (UFY70-LE K58.9).     Musculoskeletal chest pain (PFV41-ZN R07.89).       Irritable Bowel Syndrome: renewed amitriptyline and hyocsyamine 2019. Still taking probiotic with cinnamon and fish oil  Dyslipidemia: LDL range 175-228. Calcium score of zero ().  Breas Cancer screening: Biopsy with benign fibroadenoma 2017 and normal 2018. Repeat 2019  GERD: using protonix with good beneft and refilled (zantac and prilosec did not  help)  Allergic asthma: continue advair and flonase and albuterol (February 2019)  Colon cancer screening: normal 2004. Tubulovillous adenoma 2014 and tubular adenoma 8mm October 2017 with repeat 2022  Immunizations: Pneumovax 2011 and repeat age 64. Adacel 2008 and 2018. Zostavax 2014  Prediabetes: stable  Hearing: Hearing aides working well  Musculoskeletal chest pain: monitor and follow up if changes    Discussed end of life of issues: no immediate family available  Desires CPR and ventilator if needed but if no improvement within a week consider a removal. Currently in process of finding a new HCPOA

## 2022-02-15 NOTE — LETTER
(Inserted Image. Unable to display)                       2021  Re:  ANDRADE ESPARZA  :  1955        [facility]    [address] [place] [address]      Dear    [facility],    The following patient has been referred to your office/practice:  ANDRADE ESPARZA     Appointment is pending with Allergy. Please contact patient to schedule.        Please refer to the attached clinical documentation for a summary of ANDRADE's care.  Please do not hesitate to contact our office if any additional clinical questions arise. All relevant records and transition of care documents should be mailed or faxed.     Your assistance in providing continuity of care is appreciated.         Sincerely,   [address]  (P) [phone]  (F) [phone]

## 2022-02-15 NOTE — NURSING NOTE
Asthma Control Test (ACT) Total Entered On:  1/29/2021 7:47 AM CST    Performed On:  1/28/2021 7:46 AM CST by Oneyda Lu               Asthma Control Test (ACT) Total   Asthma Control Test Total (Adult) :   25    Oneyda Lu - 1/29/2021 7:46 AM CST

## 2022-02-15 NOTE — TELEPHONE ENCOUNTER
---------------------  From: Jack Mcfadden MD   To: RYLAN ESPARZAVALARIE MONTES DE OCA    Sent: 2/21/2020 11:06:34 AM CST  Subject: Labs     Dear María Elena    Prediabetes (glucose) is stable  Cholesterol is stable.    Results:  Date Result Name Ind Value Ref Range   2/20/2020 9:07 AM Glucose Level ((H)) 101 mg/dL (65 - 99)   2/20/2020 9:07 AM Cholesterol ((H)) 266 mg/dL ( - <200)   2/20/2020 9:07 AM Non-HDL Cholesterol ((H)) 216 ( - <130)   2/20/2020 9:07 AM HDL  50 mg/dL (> OR = 50 - )   2/20/2020 9:07 AM Cholesterol/HDL Ratio ((H)) 5.3 ( - <5.0)   2/20/2020 9:07 AM LDL ((H)) 187    2/20/2020 9:07 AM Triglyceride  148 mg/dL ( - <150)     Gerald Mcfadden MD

## 2022-02-15 NOTE — TELEPHONE ENCOUNTER
---------------------  From: Iwona Espinosa CMA ([facility] ([id number]))   To: KVNG Melani Pool ([id number] [facility] [place]);     Sent: 3/14/2020 10:01:20 AM CDT  Subject: FW: Medication Management   Due Date/Time: 3/15/2020 12:43:00 AM CDT           ------------------------------------------  From: [facility]/pharmacy #[id number]  To: Jack Mcfadden MD  Sent: March 14, 2020 12:43:08 AM CDT  Subject: Medication Management  Due: March 15, 2020 12:43:08 AM CDT    ** On Hold Pending Signature **  Drug: fluticasone nasal (fluticasone 50 mcg/inh nasal spray)  SPRAY 2 SPRAY(S) INTO BOTH NOSTRILS DAILY  Quantity: 48 mL  Days Supply: 90  Refills: 4  Substitutions Allowed  Notes from Pharmacy:     Dispensed Drug: fluticasone nasal (fluticasone 50 mcg/inh nasal spray)  SPRAY 2 SPRAY(S) INTO BOTH NOSTRILS DAILY  Quantity: 48 mL  Days Supply: 90  Refills: 4  Substitutions Allowed  Notes from Pharmacy:   ------------------------------------------Pt had medication refilled on 3/10/2020 and requested be sent to [facility]---------------------  From: Sandra Felix CMA   To: [facility]/pharmacy #[id number]    Sent: 3/16/2020 9:46:27 AM CDT  Subject: FW: Medication Management     ** Not Approved: Refill not appropriate, pt [facility] medication [facility] **  fluticasone nasal (FLUTICASONE PROP 50 MCG SPRAY)  SPRAY 2 SPRAY(S) INTO BOTH NOSTRILS DAILY  Qty:  48 mL        Days Supply:  90        Refills:  4          Substitutions Allowed     Route To Pharmacy - [facility]/pharmacy #[id number]   Signed by Sandra Felix CMA

## 2022-02-15 NOTE — NURSING NOTE
Depression Screening Entered On:  3/11/2020 4:20 PM CDT    Performed On:  3/10/2020 4:19 PM CDT by Oneyda Lu               Depression Screening   Little Interest - Pleasure in Activities :   Not at all   Feeling Down, Depressed, Hopeless :   Not at all   Initial Depression Screen Score :   0    Trouble Falling or Staying Asleep :   Not at all   Feeling Tired or Little Energy :   Not at all   Poor Appetite or Overeating :   Not at all   Feeling Bad About Yourself :   Not at all   Trouble Concentrating :   Not at all   Moving or Speaking Slowly :   Not at all   Thoughts Better Off Dead or Hurting Self :   Not at all   Detailed Depression Screen Score :   0    Total Depression Screen Score :   0    Oneyda Lu - 3/11/2020 4:19 PM CDT

## 2022-02-15 NOTE — PROGRESS NOTES
Chief Complaint        Indigestion and urinary frequency for a few hours yesterday.      History of Present Illness          Feeling indigestion in the mid sternum for 2-3 hours last night. Sensation began while sitting down. Still feels a little sensation in mid sternum. Able to sleep last night. During the 2-3 hrs urinating frequently.  Denies dysuria. Urination normal this morning. Tums did not help.            Chest pain 2007 with normal stress test and angiogram.            No known history of heart disease           Having shoulder pain with certain activities. Sometimes shoulder pain has caused stomach issues.      Review of Systems          No fevers           No shortness of breath           No vomiting      Physical Exam       Vitals & Measurements        T: 98.7(Tympanic)  HR: 86(Peripheral)  BP: 154/92           General: No acute distress.          HENT: No pharyngeal erythema.          Neck: No lymphadenopathy.          Respiratory: Lungs are clear to auscultation.          Cardiovascular: Normal rate, Regular rhythm.          Musculoskeletal: Normal gait.           Chest x-ray: Normal.  Images reviewed with patient           EKG: Normal sinus rhythm.  No ST changes.  Good R-wave progression.  Normal QTC and DC interval.      Assessment/Plan       Chest pain: Discussed and most likely musculoskeletal or gastrointestinal related.  Cardiac and pulmonary testing is reassuring today.  Do not feel this is heart and stress testing indicated at this time.  Will monitor symptoms and follow-up next week.  Return sooner if worse.        Patient Information         Name:ANDRADE ESPARZA          Address:          [address] [place] [id number]         Sex:Female         YOB: 1955         [phone]         Emergency Contact:TOMASA HANSON         MRN:[id number]         FIN:[id number]         Location:[facility]         Date of Service:01/17/2018          Primary  Care Physician:           Bogdan RADFORD Jack, [phone]      Medications            EpiPen 2-Wilver 0.3 mg injectable kit: 0.3 mg, IM, Once, 1 box(es), 1 Refill(s).            albuterol 90 mcg/inh inhalation aerosol: 2 puff(s), INH, 1-4x/day, aerochamber with valve, PRN: for wheezing, 17 g, 2 Refill(s).            amitriptyline 10 mg oral tablet: 20 mg, 2 tab(s), po, hs, 190 tab(s), 3 Refill(s).            fluticasone 50 mcg/inh nasal spray: 2 spray(s), nasal, daily, 3 EA, 4 Refill(s).            Advair Diskus 100 mcg-50 mcg inhalation powder: 1 puff(s), inh, bid, give 3 month supply, 60 EA, 11 Refill(s).            hyoscyamine 0.125 mg oral tablet: 0.125 mg, 1 tab(s), PO, QID, PRN: for loose stool, 50 tab(s), 3 Refill(s).            pantoprazole 40 mg oral delayed release tablet: 40 mg, 1 tab(s), po, daily, 95 tab(s), 3 Refill(s).                            Allergies        Augmentin (hives)        Zithromax (nausea, diarrhea)        doxycycline (Hives)      Diagnostic Results       Troponin: Negative       D-dimer: 0.47        WBC 4.1        Hemoglobin: 13.9        Platelets 179        Creatinine: 0.84        Sodium: 142        Potassium: 2.6        Glucose: 103  Images   CXR: normal.  Images reviewed with patient

## 2022-02-15 NOTE — NURSING NOTE
Comprehensive Intake Entered On:  4/6/2019 12:11 PM CDT    Performed On:  4/6/2019 12:09 PM CDT by Clare Rios               Summary   Chief Complaint :   Left lower abdominal pain.  Started last night.    Menstrual Status :   Hysterectomy   Weight Measured :   253.6 lb(Converted to: 253 lb 10 oz, 115.03 kg)    Systolic Blood Pressure :   148 mmHg (HI)    Diastolic Blood Pressure :   82 mmHg (HI)    Mean Arterial Pressure :   104 mmHg   Peripheral Pulse Rate :   89 bpm   Temperature Tympanic :   98.6 DegF(Converted to: 37.0 DegC)    Oxygen Saturation :   96 %   Race :      Languages :   English   Ethnicity :   Not  or    Clare Rios - 4/6/2019 12:09 PM CDT

## 2022-03-01 ENCOUNTER — OFFICE VISIT (OUTPATIENT)
Dept: AUDIOLOGY | Facility: CLINIC | Age: 67
End: 2022-03-01
Payer: COMMERCIAL

## 2022-03-02 VITALS
BODY MASS INDEX: 47.8 KG/M2 | WEIGHT: 246 LBS | HEIGHT: 61 IN | HEART RATE: 80 BPM | HEIGHT: 61 IN | DIASTOLIC BLOOD PRESSURE: 76 MMHG | TEMPERATURE: 99 F | SYSTOLIC BLOOD PRESSURE: 128 MMHG | TEMPERATURE: 98.9 F | OXYGEN SATURATION: 97 % | DIASTOLIC BLOOD PRESSURE: 84 MMHG | SYSTOLIC BLOOD PRESSURE: 150 MMHG | OXYGEN SATURATION: 100 % | HEART RATE: 93 BPM | BODY MASS INDEX: 46.44 KG/M2

## 2022-03-02 NOTE — TELEPHONE ENCOUNTER
---------------------  From: Jack Mcfadden MD   To: MARÍA ELENA ESPARZA    Sent: 2/1/2022 12:21:52 PM CST  Subject: Labs     Dear María Elena    Cholesterol stable and prediabetes stable.      Results:  Date Result Name Ind Value Ref Range   1/31/2022 12:26 PM Cholesterol ((H)) 257 mg/dL ( - <200)   1/31/2022 12:26 PM HDL  51 mg/dL (> OR = 50 - )   1/31/2022 12:26 PM Triglyceride  94 mg/dL ( - <150)   1/31/2022 12:26 PM LDL ((H)) 185    1/31/2022 12:26 PM Cholesterol/HDL Ratio ((H)) 5.0 ( - <5.0)   1/31/2022 12:26 PM Non-HDL Cholesterol ((H)) 206 ( - <130)   1/31/2022 12:26 PM Glucose Level ((H)) 110 mg/dL (65 - 99)     Gerald Mcfadden M.D.

## 2022-03-02 NOTE — PROGRESS NOTES
Patient:   ANDRADE ESPARZA            MRN: [id number]            FIN: [id number]               Age:   66 years     Sex:  Female     :  1955   Associated Diagnoses:   Allergic reaction   Author:   Jaxon Reza PA-C      Report Summary   Diagnosis  Allergic reaction (APE14-ST T78.40XA).  Patient InstructionsSummaryOrders   Visit Information   Accompanied by:  No one.    Source of history:  Self, Medical record.    Referral source:  Self.    History limitation:  None.       Chief Complaint   2022 10:18 AM CST    c/o tounge feels prickly, swollen and has trouble swallowing since yesterday        History of Present Illness             The patient presents with oral problem(s).  The oral problem is described as tight.  The location of the oral problem is tongue.  The severity of the oral problem is moderate.  The oral problem has lasted for 1 day(s).  Associated symptoms consist of denies drooling and denies painful swallowing.  Tongue feels thick. Able to swallow. No wheezing or SOB. No fever or chills. No new exposures. Has had previously. Not currently on antihistamine. CC above noted and confirmed with the patient..        Review of Systems   Constitutional:  Negative.    Ear/Nose/Mouth/Throat:  Negative except as documented in history of present illness.    Respiratory:  Negative.       Health Status   Allergies:    Allergic Reactions (Selected)  Severity Not Documented  Augmentin (Hives)  Doxycycline (Hives)  MetroNIDAZOLE (No reactions were documented)  Seasonal/Environmental Allergies (No reactions were documented)  Sulfa drug (No reactions were documented)  Zithromax (Nausea, diarrhea)   Medications:  (Selected)   Prescriptions  Prescribed  Advair Diskus 250 mcg-50 mcg inhalation powder: 1 puff, Inhale, bid, Instructions: increased dose, # 180 blister, 3 Refill(s), Type: Maintenance, Pharmacy: [id number], 1 puff Inhale bid,x90 day(s),Instr:increased dose, 61, in, 10/20/21 10:42:00 CDT, Height  Measured, 253, lb, 04/26/21 14:...  EpiPen 2-Wilver 0.3 mg injectable kit: ( 0.3 mg ), IM, Once, # 1 EA, 1 Refill(s), Type: Soft Stop, Pharmacy: [place] [id number], 0.3 mg IM once, 61, in, 01/07/22 10:18:00 CST, Height Measured, 253, lb, 04/26/21 14:27:00 CDT, Weight Measured  Pepcid 40 mg oral tablet: = 1 tab(s) ( 40 mg ), Oral, hs, # 10 tab(s), 0 Refill(s), Type: Maintenance, Pharmacy: [place] [address], 1 tab(s) Oral hs,x10 day(s), 61, in, 01/07/22 10:18:00 CST, Height Measured, 253, lb, 04/26/21 14:27:00 CDT, Weight Measured  Singulair 10 mg oral tablet: = 1 tab(s) ( 10 mg ), Oral, qpm, # 90 tab(s), 3 Refill(s), Type: Maintenance, Pharmacy: [facility], 1 tab(s) Oral qpm, 61, in, 10/20/21 10:42:00 CDT, Height Measured, 253, lb, 04/26/21 14:27:00 CDT, Weight Measured  albuterol 90 mcg/inh inhalation aerosol: 2 puff(s), INH, 1-4x/day, Instructions: aerochamber with valve, PRN: for wheezing, # 17 g, 2 Refill(s), Type: Maintenance, Pharmacy: [facility], 2 puff(s) Inhale 1-4x/day,PRN:for wheezing,Instr:aerochamber with valve, 61, in, 01/28/21 11:02...  amitriptyline 10 mg oral tablet: = 2 tab(s) ( 20 mg ), po, hs, Instructions: fill when needed, # 190 tab(s), 3 Refill(s), Type: Maintenance, Pharmacy: [place] [id number], 2 tab(s) Oral hs,Instr:fill when needed, 61, in, 01/28/21 11:02:00 CST, Height Measured, 253.4, lb, 01/28/21 1...  fluticasone 50 mcg/inh nasal spray: = 2 spray(s), nasal, daily, Instructions: fill when needed, # 3 EA, 4 Refill(s), Type: Maintenance, Pharmacy: [id number], 2 spray(s) Nasal daily,Instr:fill when needed, 61, in, 10/20/21 10:42:00 CDT, Height Measured, 253, lb, 04/26/21 14:27...  hyoscyamine 0.125 mg oral tablet: = 1 tab(s) ( 0.125 mg ), PO, QID, Instructions: fill when needed, PRN: for loose stool, # 10 tab(s), 3 Refill(s), Type: Maintenance, Pharmacy: [facility], 1 tab(s) Oral qid,PRN:for loose stool,Instr:fill when needed, 61, in,  01/28/21 11:02:...  pantoprazole 40 mg oral delayed release tablet: = 1 tab(s), Oral, daily, Instructions: fill when needed, # 95 tab(s), 3 Refill(s), RAVEN, Type: Maintenance, Pharmacy: [id number], Pt due for clinic visit., 1 tab(s) Oral daily,Instr:fill when needed, 61, in, 01/28/21 11:02:00 CST, Height Susana...  predniSONE 20 mg oral tablet: = 2 tab(s) ( 40 mg ), Oral, daily, x 7 day(s), # 14 tab(s), 0 Refill(s), Type: Acute, Pharmacy: [id number], 2 tab(s) Oral daily,x7 day(s), 61, in, 01/07/22 10:18:00 CST, Height Measured, 253, lb, 04/26/21 14:27:00 CDT, Weight Measured   Problem list:    All Problems (Selected)  Prediabetes / SNOMED CT 4289512543 / Confirmed  Osteopenia / SNOMED CT 792454340 / Confirmed  Obese / SNOMED CT 9448416108 / Probable  IBS (irritable bowel syndrome) / SNOMED CT 12247049 / Confirmed  History of adenomatous polyp of colon / SNOMED CT 2752519090 / Confirmed  GERD (gastroesophageal reflux disease) / SNOMED CT 287407126 / Confirmed  Dyslipidemia / SNOMED CT 2682936472 / Confirmed  Cholecystitis / SNOMED CT 055662510 / Confirmed  Allergic asthma / SNOMED CT 5767474229 / Confirmed      Histories   Past Medical History:    Active  Prediabetes (9229802168): Onset on 3/11/2013 at 57 years.  Cholecystitis (511485009)  GERD (gastroesophageal reflux disease) (599100913)  Allergic asthma (5105921709)  Comments:  2/9/2010 CST 11:48 AM CST - Belen Sawant CMA  fall/spring- mild persistant  Dyslipidemia (7739822980)  IBS (irritable bowel syndrome) (67658737)  Osteopenia (894344864)  Obese (7163482276)  History of adenomatous polyp of colon (0345944083)  Resolved  Chicken pox (91130815):  Resolved.   Procedure history:    DEXA - Dual energy X-ray photon absorptiometry (628563883) on 3/25/2016 at 60 Years.  Comments:  3/25/2016 8:07 AM CDT - Alpa Washington  Osteopenia stable  Colonoscopy (632176238) on 8/21/2014 at 59 Years.  Comments:  8/28/2014 5:52 PM CDT - Nayeli MURPHY,  Myla  Polyps x2 - random bx    8/28/2014 5:49 PM CDT - Myla Tyler CMA  Indication: screening  Sedation: MAC  Tubulovillous adenoma x2  Repeat in 3 years  DEXA on 8/2/2010 at 55 Years.  Comments:  8/5/2010 10:25 AM CDT - Jessica Vu  Osteopenia, slightly worse. Repeat 3-5 yrs.  Left Bunionectomy in the month of 12/2008 at 53 Years.  Coronary angiogram (48771825) in the month of 8/2007 at 52 Years.  Cardiovascular stress testing (804769206) in the month of 3/2007 at 51 Years.  Colonoscopy normal-recheck 2014 (503850444) in the month of 8/2004 at 49 Years.  Comments:  7/30/2010 9:29 AM CDT - Rashawn DELATORRE, Jessica  Normal. Repeat 10 yrs. Due 2014.  Arthroscopy of left shoulder in the month of 9/2003 at 48 Years.  Cardiovascular stress testing (959169005) on 7/10/2003 at 47 Years.  Esophagogastroduodenoscopy (443323215) in the month of 7/2003 at 47 Years.  LAVH - Laparoscopy assisted vaginal hysterectomy with BSO (3293615607) in the month of 2/2003 at 47 Years.  Cholecystectomy (84871943) in 2000 at 45 Years.  laparoscopy-removal uterine fibroid on 11/24/1997 at 42 Years.  right ovarian cystectomy.  Colonoscopy (320821719).  Comments:  11/9/2017 8:22 AM CST - Meghan Mcdaniels  Sedation:  Fentanyl and Versed  Polyps:  tubular adenoma  Diverticuli: sigmoid  F/u in 5 years  Tonsillectomy (759823564).      Physical Examination   Vital Signs   1/7/2022 10:18 AM CST Temperature Tympanic 99.0 DegF    Peripheral Pulse Rate 93 bpm    HR Method Electronic    Systolic Blood Pressure 150 mmHg  HI    Diastolic Blood Pressure 84 mmHg  HI    Mean Arterial Pressure 106 mmHg    BP Site Right arm    BP Method Electronic    Oxygen Saturation 100 %      Measurements from flowsheet : Measurements   1/7/2022 10:18 AM CST Ht/Wt Measurement Refused by Patient? Yes    Height Measured - Standard 61 in      General:  Alert and oriented, No acute distress.    HENT:  Oral mucosa is moist, No pharyngeal erythema.    Neck:   Supple, Non-tender, No lymphadenopathy.    Respiratory:  Lungs are clear to auscultation, Respirations are non-labored.    Cardiovascular:  Normal rate, Regular rhythm.       Impression and Plan   Diagnosis     Allergic reaction ([id number] T78.40XA).     Patient Instructions:       Counseled: Patient, Regarding diagnosis, Regarding medications, Verbalized understanding.    Summary:  To ED if acute worsening as we discussed. This should gradually improve..    Orders     Orders (Selected)   Prescriptions  Prescribed  EpiPen 2-Wilver 0.3 mg injectable kit: ( 0.3 mg ), IM, Once, # 1 EA, 1 Refill(s), Type: Soft Stop, Pharmacy: [id number], 0.3 mg IM once, 61, in, 01/07/22 10:18:00 CST, Height Measured, 253, lb, 04/26/21 14:27:00 CDT, Weight Measured  Pepcid 40 mg oral tablet: = 1 tab(s) ( 40 mg ), Oral, hs, # 10 tab(s), 0 Refill(s), Type: Maintenance, Pharmacy: [facility], 1 tab(s) Oral hs,x10 day(s), 61, in, 01/07/22 10:18:00 CST, Height Measured, 253, lb, 04/26/21 14:27:00 CDT, Weight Measured  predniSONE 20 mg oral tablet: = 2 tab(s) ( 40 mg ), Oral, daily, x 7 day(s), # 14 tab(s), 0 Refill(s), Type: Acute, Pharmacy: [id number], 2 tab(s) Oral daily,x7 day(s), 61, in, 01/07/22 10:18:00 CST, Height Measured, 253, lb, 04/26/21 14:27:00 CDT, Weight Measured.

## 2022-03-02 NOTE — NURSING NOTE
Comprehensive Intake Entered On:  1/7/2022 10:24 AM CST    Performed On:  1/7/2022 10:18 AM CST by Elizabeth Peña CMA               Summary   Chief Complaint :   c/o tosue feels prickly, swollen and has trouble swallowing since yesterday   Advance Directive :   No   Menstrual Status :   Hysterectomy   Height Measured :   61 in(Converted to: 5 ft 1 in, 154.94 cm)    Ht/Wt Measurement Refused by Patient? :   Yes   Systolic Blood Pressure :   150 mmHg (HI)    Diastolic Blood Pressure :   84 mmHg (HI)    Mean Arterial Pressure :   106 mmHg   Peripheral Pulse Rate :   93 bpm   BP Site :   Right arm   BP Method :   Electronic   HR Method :   Electronic   Temperature Tympanic :   99.0 DegF(Converted to: 37.2 DegC)    Oxygen Saturation :   100 %   Race :      Languages :   English   Ethnicity :   Not  or    Elizabeth Peña CMA - 1/7/2022 10:18 AM CST   Health Status   Allergies Verified? :   Yes   Medication History Verified? :   Yes   Medical History Verified? :   Yes   Tobacco Use? :   Never smoker   Elizabeth Peña CMA - 1/7/2022 10:18 AM CST   Consents   Consent for Immunization Exchange :   Consent Granted   Consent for Immunizations to Providers :   Consent Granted   Elizabeth Peña CMA - 1/7/2022 10:18 AM CST   Meds / Allergies   (As Of: 1/7/2022 10:24:58 AM CST)   Allergies (Active)   Augmentin  Estimated Onset Date:   Unspecified ; Reactions:   hives ; Created By:   Myla Tyler CMA; Reaction Status:   Active ; Category:   Drug ; Substance:   Augmentin ; Type:   Allergy ; Updated By:   Myla Tyler CMA; Reviewed Date:   1/7/2022 10:22 AM CST      doxycycline  Estimated Onset Date:   <not entered> 8/1/1997 ; Reactions:   Hives ; Created By:   Belen Adams; Reaction Status:   Active ; Category:   Drug ; Substance:   doxycycline ; Type:   Allergy ; Updated By:   Belen Adams; Source:   Paper Chart ; Reviewed Date:   1/7/2022 10:22 AM CST      metroNIDAZOLE  Estimated Onset  Date:   Unspecified ; Created By:   Silvia Howell PA-C; Reaction Status:   Active ; Category:   Drug ; Substance:   metroNIDAZOLE ; Type:   Allergy ; Updated By:   Silvia Howell PA-C; Reviewed Date:   1/7/2022 10:22 AM CST      Seasonal/Environmental Allergies  Estimated Onset Date:   Unspecified ; Created By:   Sandra Felix CMA; Reaction Status:   Active ; Category:   Environment ; Substance:   Seasonal/Environmental Allergies ; Type:   Allergy ; Updated By:   Sandra Felix CMA; Reviewed Date:   1/7/2022 10:22 AM CST      sulfa drug  Estimated Onset Date:   Unspecified ; Created By:   Silvia Howell PA-C; Reaction Status:   Active ; Category:   Drug ; Substance:   sulfa drug ; Type:   Allergy ; Updated By:   Silvia Howell PA-C; Reviewed Date:   1/7/2022 10:22 AM CST      Zithromax  Estimated Onset Date:   Unspecified ; Reactions:   nausea, diarrhea ; Created By:   Nicole Spicer CMA; Reaction Status:   Active ; Category:   Drug ; Substance:   Zithromax ; Type:   Allergy ; Updated By:   Nicole Spicer CMA; Source:   Patient ; Reviewed Date:   1/7/2022 10:22 AM CST        Medication List   (As Of: 1/7/2022 10:24:58 AM CST)   Prescription/Discharge Order    albuterol  :   albuterol ; Status:   Prescribed ; Ordered As Mnemonic:   albuterol 90 mcg/inh inhalation aerosol ; Simple Display Line:   2 puff(s), INH, 1-4x/day, aerochamber with valve, PRN: for wheezing, 17 g, 2 Refill(s) ; Ordering Provider:   Jack Mcfadden MD; Catalog Code:   albuterol ; Order Dt/Tm:   1/28/2021 11:50:06 AM CST          fluticasone-salmeterol  :   fluticasone-salmeterol ; Status:   Prescribed ; Ordered As Mnemonic:   Advair Diskus 250 mcg-50 mcg inhalation powder ; Simple Display Line:   1 puff, Inhale, bid, for 90 day(s), increased dose, 180 blister, 3 Refill(s) ; Ordering Provider:   Jack Mcfadden MD; Catalog Code:   fluticasone-salmeterol ; Order Dt/Tm:   10/20/2021 11:13:26 AM CDT           amitriptyline  :   amitriptyline ; Status:   Prescribed ; Ordered As Mnemonic:   amitriptyline 10 mg oral tablet ; Simple Display Line:   20 mg, 2 tab(s), po, hs, fill when needed, 190 tab(s), 3 Refill(s) ; Ordering Provider:   Jack Mcfadden MD; Catalog Code:   amitriptyline ; Order Dt/Tm:   1/28/2021 11:49:21 AM CST          fluticasone nasal  :   fluticasone nasal ; Status:   Prescribed ; Ordered As Mnemonic:   fluticasone 50 mcg/inh nasal spray ; Simple Display Line:   2 spray(s), nasal, daily, fill when needed, 3 EA, 4 Refill(s) ; Ordering Provider:   Jack Mcfadden MD; Catalog Code:   fluticasone nasal ; Order Dt/Tm:   10/20/2021 11:14:54 AM CDT          pantoprazole  :   pantoprazole ; Status:   Prescribed ; Ordered As Mnemonic:   pantoprazole 40 mg oral delayed release tablet ; Simple Display Line:   1 tab(s), Oral, daily, fill when needed, 95 tab(s), 3 Refill(s) ; Ordering Provider:   Jack Mcfadden MD; Catalog Code:   pantoprazole ; Order Dt/Tm:   1/28/2021 11:49:06 AM CST          montelukast  :   montelukast ; Status:   Prescribed ; Ordered As Mnemonic:   Singulair 10 mg oral tablet ; Simple Display Line:   10 mg, 1 tab(s), Oral, qpm, 90 tab(s), 3 Refill(s) ; Ordering Provider:   Jack Mcfadden MD; Catalog Code:   montelukast ; Order Dt/Tm:   10/20/2021 11:14:40 AM CDT          hyoscyamine  :   hyoscyamine ; Status:   Prescribed ; Ordered As Mnemonic:   hyoscyamine 0.125 mg oral tablet ; Simple Display Line:   0.125 mg, 1 tab(s), PO, QID, fill when needed, PRN: for loose stool, 10 tab(s), 3 Refill(s) ; Ordering Provider:   Jack Mcfadden MD; Catalog Code:   hyoscyamine ; Order Dt/Tm:   1/28/2021 11:48:43 AM CST          EPINEPHrine  :   EPINEPHrine ; Status:   Prescribed ; Ordered As Mnemonic:   EpiPen 2-Wilver 0.3 mg injectable kit ; Simple Display Line:   0.3 mg, IM, Once, 1 EA, 1 Refill(s) ; Ordering Provider:   Jack Mcfadden MD; Catalog Code:   EPINEPHrine ; Order Dt/Tm:   1/23/2018 4:18:12 PM  CST            Social History   Social History   (As Of: 1/7/2022 10:24:58 AM CST)   Alcohol:        Quit in 1975   (Last Updated: 1/29/2021 10:27:51 AM CST by Peggy Rose)          Tobacco:        Never   (Last Updated: 6/25/2014 4:27:30 PM CDT by Myla Tyler CMA)   Never (less than 100 in lifetime)   (Last Updated: 1/28/2021 11:03:00 AM CST by Sandra Felix CMA)          Electronic Cigarette/Vaping:        Electronic Cigarette Use: Never.   (Last Updated: 1/28/2021 11:03:03 AM CST by Sandra Felix CMA)          Substance Abuse:        Never   (Last Updated: 6/25/2014 4:27:45 PM CDT by Myla Tyler CMA)          Employment/School:        Retired, Work/School description: Ag teacher, [employer]..   (Last Updated: 1/29/2021 10:29:33 AM CST by Peggy Rose)          Home/Environment:        Marital status: Single.  Living situation: Home/Independent.  Injuries/Abuse/Neglect in household: No.  Feels unsafe at home: No.  Family/Friends available for support: Yes.   (Last Updated: 1/29/2021 10:29:24 AM CST by Peggy Rose)          Nutrition/Health:        Diet: Low fat.  Sleeping concerns: No.  Feels highly stressed: No.   (Last Updated: 1/29/2021 10:28:30 AM CST by Peggy Rose)          Exercise:  Occasional exercise      Exercise frequency: 3-4 times/week.  Exercise type: Bicycling, Walking, Weight lifting.   (Last Updated: 1/29/2021 10:28:15 AM CST by Peggy Rose)          Sexual:        Sexually active: No.   Comments:  7/30/2010 9:33 AM - Jessica Vu: .   (Last Updated: 7/30/2010 9:33:28 AM CDT by Jessica Vu)

## 2022-03-02 NOTE — TELEPHONE ENCOUNTER
---------------------  From: Jack Mcfadden MD   To: Sandra Felix CMA;     Sent: 2/10/2022 6:25:20 PM CST  Subject: aerochamber     Sandra  Please let [facility] pharmacy ([place]) know she needs an aerochamber and it was written on the albuterol rachel Woods script to [facility] for spacer chamber.

## 2022-03-02 NOTE — PROGRESS NOTES
Patient:   ANDRADE ESPARZA            MRN: [id number]            FIN: [id number]               Age:   66 years     Sex:  Female     :  1955   Associated Diagnoses:   Annual physical exam; IBS (irritable bowel syndrome); GERD (gastroesophageal reflux disease); Allergic asthma   Author:   Jack Mcfadden MD      Visit Information      Date of Service: 2022 11:17 am  Performing Location: [facility]  Encounter#: [id number]      Primary Care Provider (PCP):  Jack Mcfadden MD    NPI# [id number]      Chief Complaint   2022 11:20 AM CST   AWV      Well Adult History   Advanced Directive: DNR but consider intubation for reversible pneumonia  Activities of Daily Living: able to bathe, dress and feed self  Instrumental Activities of Daily Living: able to take own medications, handle finances, prepare food and perform house keeping  Behavioral Risk: no alcohol, nonsmoker, some physical activity, good home safety  Psychosocial risk: does not feel stressed, pain, fatigue, depressed  Self Assessment: Good health status with one fall last April  Providers: Dr Mcfadden (Primary care)  Hearing: Using hearing aides  Vision: Good with glasses    IBS well controlled. Started taking mikael and feels helping. Rarely needs the hyoscyamine (once in last 6 months). Imodium helps. Amitriptyline continues to give her good relief.  Asthma controlled with Advair and increased the dose in  to 250/50. Uses it twice daily during allergy season and otherwise once daily.  Allergic rhinitis currently controlled with allegra and flonase. Seen by Allergist. Astelin caused nose bleeds.  Protonix helps acid reflux.  Had pollen related anaphylactic reaction in past.    Coronary CT Angio: normal 2007  Cardiac calcium score (): zero  EGD: normal  (done for chest pain with normal stress test)  Stress Test: normal 2007  Colonoscopy: normal  2004    Thinks her chest pain is slipped rib  syndrome. Feels she has ribs popping in and out place at time dependent on the amount of lifting and carrying. Pain decreases with pressing on chest. Happens couple times a month       Review of Systems   Constitutional:  No fever.    Respiratory:  No shortness of breath, No cough.    Cardiovascular:  No palpitations.    Breast:  Negative.    Gastrointestinal:  No nausea, No vomiting, No diarrhea, No constipation, No rectal bleeding.    Genitourinary:  No change in urine stream.    Hematology/Lymphatics:  No bruising tendency, No bleeding tendency.    Endocrine:  Negative.    Musculoskeletal:  No joint pain, No muscle pain.    Integumentary:  No rash.    Neurologic:  Alert and oriented X4, No abnormal balance, No numbness, No headache.    Psychiatric:  Negative.    All other systems reviewed and negative   PHQ-9: 0pts (March 2020). 1pt (2014); 4pts (2013).    ACT: 25 pts (January 2021). 23pts (March 2020). 25pts (February 2019). 24pts (January 2018). 25pts (January 2017)     GDS: 2pts (January 2022). 2pts (January 2021)  Nondrinker. Had allergic reaction age 22 to alcohol   Genitourinary: nocturia seldom if drinks a lot of water at night. No incontinence    No new significant symptoms      Health Status   Allergies:    Allergic Reactions (Selected)  Severity Not Documented  Augmentin (Hives)  Doxycycline (Hives)  MetroNIDAZOLE (No reactions were documented)  Seasonal/Environmental Allergies (No reactions were documented)  Sulfa drug (No reactions were documented)  Zithromax (Nausea, diarrhea)   Medications:  (Selected)   Prescriptions  Prescribed  Advair Diskus 250 mcg-50 mcg inhalation powder: 1 puff, Inhale, bid, Instructions: increased dose, # 180 blister, 3 Refill(s), Type: Maintenance, Pharmacy: [id number], 1 puff Inhale bid,x90 day(s),Instr:increased dose, 61, in, 10/20/21 10:42:00 CDT, Height Measured, 253, lb, 04/26/21 14:...  EpiPen 2-Wilver 0.3 mg injectable kit: ( 0.3 mg ), IM, Once, # 1 EA, 1  Refill(s), Type: Soft Stop, Pharmacy: [facility], 0.3 mg IM once, 61, in, 01/07/22 10:18:00 CST, Height Measured, 253, lb, 04/26/21 14:27:00 CDT, Weight Measured  Pepcid 40 mg oral tablet: = 1 tab(s) ( 40 mg ), Oral, hs, # 10 tab(s), 0 Refill(s), Type: Maintenance, Pharmacy: [place] [id number], 1 tab(s) Oral hs,x10 day(s), 61, in, 01/07/22 10:18:00 CST, Height Measured, 253, lb, 04/26/21 14:27:00 CDT, Weight Measured  Singulair 10 mg oral tablet: = 1 tab(s) ( 10 mg ), Oral, qpm, # 90 tab(s), 3 Refill(s), Type: Maintenance, Pharmacy: [facility], 1 tab(s) Oral qpm, 61, in, 10/20/21 10:42:00 CDT, Height Measured, 253, lb, 04/26/21 14:27:00 CDT, Weight Measured  albuterol 90 mcg/inh inhalation aerosol: 2 puff(s), INH, 1-4x/day, Instructions: aerochamber with valve, PRN: for wheezing, # 17 g, 2 Refill(s), Type: Maintenance, Pharmacy: [place] [id number], 2 puff(s) Inhale 1-4x/day,PRN:for wheezing,Instr:aerochamber with valve, 61, in, 01/28/21 11:02...  amitriptyline 10 mg oral tablet: = 2 tab(s) ( 20 mg ), po, hs, Instructions: fill when needed, # 190 tab(s), 3 Refill(s), Type: Maintenance, Pharmacy: [place] [id number], 2 tab(s) Oral hs,Instr:fill when needed, 61, in, 01/28/21 11:02:00 CST, Height Measured, 253.4, lb, 01/28/21 1...  fluticasone 50 mcg/inh nasal spray: = 2 spray(s), nasal, daily, Instructions: fill when needed, # 3 EA, 4 Refill(s), Type: Maintenance, Pharmacy: [id number], 2 spray(s) Nasal daily,Instr:fill when needed, 61, in, 10/20/21 10:42:00 CDT, Height Measured, 253, lb, 04/26/21 14:27...  hyoscyamine 0.125 mg oral tablet: = 1 tab(s) ( 0.125 mg ), PO, QID, Instructions: fill when needed, PRN: for loose stool, # 10 tab(s), 3 Refill(s), Type: Maintenance, Pharmacy: [id number], 1 tab(s) Oral qid,PRN:for loose stool,Instr:fill when needed, 61, in, 01/28/21 11:02:...  pantoprazole 40 mg oral delayed release tablet: = 1 tab(s), Oral, daily, Instructions:  fill when needed, # 95 tab(s), 3 Refill(s), RAVEN, Type: Maintenance, Pharmacy: [id number], Pt due for clinic visit., 1 tab(s) Oral daily,Instr:fill when needed, 61, in, 01/28/21 11:02:00 CST, Height Susana...   Problem list:    All Problems  Allergic asthma / SNOMED CT 6877783115 / Confirmed  Cholecystitis / SNOMED CT 901675396 / Confirmed  Dyslipidemia / SNOMED CT 1026486406 / Confirmed  GERD (gastroesophageal reflux disease) / SNOMED CT 586407678 / Confirmed  History of adenomatous polyp of colon / SNOMED CT 4283038948 / Confirmed  IBS (irritable bowel syndrome) / SNOMED CT 48202785 / Confirmed  Obese / SNOMED CT 4638021306 / Probable  Osteopenia / SNOMED CT 458806018 / Confirmed  Prediabetes / SNOMED CT 6060373543 / Confirmed  Resolved: Chicken pox / SNOMED CT 28393828  Canceled: Tubulovillous adenoma / SNOMED CT 80074108      Histories   Procedure history:    DEXA - Dual energy X-ray photon absorptiometry (SNOMED CT 392451296) on 3/25/2016 at 60 Years.  Comments:  3/25/2016 8:07 AM CDT - Alpa Washington  Osteopenia stable  Colonoscopy (SNOMED CT 478183341) performed by Jack Mcfadden MD on 8/21/2014 at 59 Years.  Comments:  8/28/2014 5:52 PM CDT - Myla Tyler CMA  Polyps x2 - random bx    8/28/2014 5:49 PM JAKT Myla Wright CMA  Indication: screening  Sedation: MAC  Tubulovillous adenoma x2  Repeat in 3 years  DEXA on 8/2/2010 at 55 Years.  Comments:  8/5/2010 10:25 AM CDT - Jessica Vu  Osteopenia, slightly worse. Repeat 3-5 yrs.  Left Bunionectomy in the month of 12/2008 at 53 Years.  Coronary angiogram (SNOMED CT 28476638) in the month of 8/2007 at 52 Years.  Cardiovascular stress testing (SNOMED CT 086719505) in the month of 3/2007 at 51 Years.  Colonoscopy normal-recheck 2014 (SNOMED CT 539818295) performed by Jack Mcfadden MD in the month of 8/2004 at 49 Years.  Comments:  7/30/2010 9:29 AM JAKT - Rashawn DELATORRE, Jessica Mckeon. Repeat 10 yrs. Due 2014.  Arthroscopy of left  shoulder in the month of 2003 at 48 Years.  Cardiovascular stress testing (SNOMED CT 713955949) on 7/10/2003 at 47 Years.  Esophagogastroduodenoscopy (SNOMED CT 378406706) in the month of 2003 at 47 Years.  LAVH - Laparoscopy assisted vaginal hysterectomy with BSO (SNOMED CT 5491474482) performed by Gregory Garcia MD in the month of 2003 at 47 Years.  Cholecystectomy (SNOMED CT 47557183) in  at 45 Years.  laparoscopy-removal uterine fibroid on 1997 at 42 Years.  right ovarian cystectomy.  Colonoscopy (SNOMED CT 352515354) performed by Jack Mcfadden MD.  Comments:  2017 8:22 AM CST - Meghan Mcdaniels  Sedation:  Fentanyl and Versed  Polyps:  tubular adenoma  Diverticuli: sigmoid  F/u in 5 years  Tonsillectomy (SNOMED CT 907174439).     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 Mom had lived with her and  . Has no immediate family. Nonsmoker  Family History: Father  heart attack. Mom  age 83. Brother  MVA       Physical Examination   Vital Signs   2022 11:20 AM CST Temperature Tympanic 98.9 DegF    Peripheral Pulse Rate 80 bpm    Pulse Site Radial artery    HR Method Electronic    Systolic Blood Pressure 128 mmHg    Diastolic Blood Pressure 76 mmHg    Mean Arterial Pressure 93 mmHg    BP Site Right arm    BP Method Manual    Oxygen Saturation 97 %      Measurements from flowsheet : Measurements   2022 11:20 AM CST Height Measured - Standard 61 in    Weight Measured - Standard 246 lb    BSA 2.19 m2    Body Mass Index 46.48 kg/m2  HI      General:  Alert and oriented, No acute distress.    Eye:  Normal conjunctiva.    HENT:  Tympanic membranes are clear, No pharyngeal erythema.    Neck:  Non-tender, No lymphadenopathy, No thyromegaly.    Respiratory:  Lungs are clear to auscultation, Respirations are non-labored.    Cardiovascular:  Normal rate, Regular rhythm, No murmur.    Breast:  defers.    Gastrointestinal:  Soft, Non-tender,  Non-distended.    Gynecologic:  defers.    Musculoskeletal:  Normal range of motion, Normal strength, No swelling, Normal gait.    Integumentary:  Pink, No rash.    Neurologic:  Alert, Oriented, Normal sensory, Normal motor function, No focal deficits.    Psychiatric:  Appropriate mood & affect.    Memory with immediate recall 3/3 and delayed 3/3    What year is it? 2022  Whate date is it? January 31st  What city do you live in? [place]      Impression and Plan   Diagnosis     Annual physical exam (LCC63-ZS Z00.00).     IBS (irritable bowel syndrome) (ZLK91-YP K58.9).     GERD (gastroesophageal reflux disease) (VDO68-TS K21.9).     Allergic asthma (BLP40-QD J45.909).         AAA screen: nonsmoker and no family history AAA  Cervical Cancer Screening: had hysterectomy 2003 (reviewed pathology report and cervix removed)  Glaucoma Screen: Spring 2021  Nutrition weight and diet: discussed and lost 7 lbs since 2021    Irritable Bowel Syndrome: renewed amitriptyline and hyocsyamine. Still taking probiotic with cinnamon and fish oil and calcium with vitamin D  Dyslipidemia: LDL range 175-228. Calcium score of zero (2012). Discussed and declines repeat calcium score  Breast Cancer screening: Biopsy with benign fibroadenoma July 2017. Normal mammogram  September 2021. Declines breast exam  GERD: using protonix with good beneft and refilled (zantac and prilosec did not help).   Allergic asthma: continue advair and flonase and albuterol  Colon cancer screening: normal 2004. Tubulovillous adenoma 2014 and tubular adenoma 8mm October 2017 with repeat October 2022  Immunizations: Pneumovax 2011 and 2021. Adacel 2008 and 2018. Zostavax 2014. Shingrix 2021. Coronavirus 2021  Osteopenia: DEXA March 2016 with hip -1.2 and spine 1.3. DEXA hip -1.2 and spine 1.6 February 2021 and recheck 2026  Prediabetes: stable  Diverticulitis: episode 2019  Hearing: Hearing aides  Anaphylactic reaction: from pollen and given epinephrine. Declines  epipen refill due to cost. Has never used the epipen.  Declines HIV and hepatitis C screening  Renewed medications January 2022    Discussed end of life of issues: no immediate family available  Desires DNR Currently in process of finding a new HCPOA (Jen Landis)     Preventative service checklist reviewed, updated and copy given to patient. Please see scanned document.

## 2022-03-02 NOTE — NURSING NOTE
Medicare Visit Entered On:  1/31/2022 11:34 AM CST    Performed On:  1/31/2022 11:20 AM CST by Sandra Felix CMA               Summary   Chief Complaint :   AWV    Advance Directive :   No   Menstrual Status :   Hysterectomy   Weight Measured :   246 lb(Converted to: 246 lb 0 oz, 111.584 kg)    Height Measured :   61 in(Converted to: 5 ft 1 in, 154.94 cm)    Body Mass Index :   46.48 kg/m2 (HI)    Body Surface Area :   2.19 m2   Systolic Blood Pressure :   128 mmHg   Diastolic Blood Pressure :   76 mmHg   Mean Arterial Pressure :   93 mmHg   Peripheral Pulse Rate :   80 bpm   BP Site :   Right arm   Pulse Site :   Radial artery   BP Method :   Manual   HR Method :   Electronic   Temperature Tympanic :   98.9 DegF(Converted to: 37.2 DegC)    Oxygen Saturation :   97 %   Race :      Languages :   English   Ethnicity :   Not  or    Sandra Felix CMA - 1/31/2022 11:20 AM CST   Health Status   Allergies Verified? :   Yes   Medication History Verified? :   Yes   Medical History Verified? :   No   Pre-Visit Planning Status :   Completed   Tobacco Use? :   Never smoker   Sandra Felix CMA - 1/31/2022 11:20 AM CST   Consents   Consent for Immunization Exchange :   Consent Granted   Consent for Immunizations to Providers :   Consent Granted   Sandar Felix CMA - 1/31/2022 11:20 AM CST   Meds / Allergies   (As Of: 1/31/2022 11:34:56 AM CST)   Allergies (Active)   Augmentin  Estimated Onset Date:   Unspecified ; Reactions:   hives ; Created By:   Myla Tyler CMA; Reaction Status:   Active ; Category:   Drug ; Substance:   Augmentin ; Type:   Allergy ; Updated By:   Myla Tyler CMA; Reviewed Date:   1/31/2022 11:24 AM CST      doxycycline  Estimated Onset Date:   <not entered> 8/1/1997 ; Reactions:   Hives ; Created By:   Belen Adams; Reaction Status:   Active ; Category:   Drug ; Substance:   doxycycline ; Type:   Allergy ; Updated By:   Belen Adams; Source:   Paper Chart  ; Reviewed Date:   1/31/2022 11:24 AM CST      metroNIDAZOLE  Estimated Onset Date:   Unspecified ; Created By:   Silvia Howell PA-C; Reaction Status:   Active ; Category:   Drug ; Substance:   metroNIDAZOLE ; Type:   Allergy ; Updated By:   Silvia Howell PA-C; Reviewed Date:   1/31/2022 11:24 AM CST      Seasonal/Environmental Allergies  Estimated Onset Date:   Unspecified ; Created By:   Sandra Felix CMA; Reaction Status:   Active ; Category:   Environment ; Substance:   Seasonal/Environmental Allergies ; Type:   Allergy ; Updated By:   Sandra Felix CMA; Reviewed Date:   1/31/2022 11:24 AM CST      sulfa drug  Estimated Onset Date:   Unspecified ; Created By:   Silvia Howell PA-C; Reaction Status:   Active ; Category:   Drug ; Substance:   sulfa drug ; Type:   Allergy ; Updated By:   Silvia Howell PA-C; Reviewed Date:   1/31/2022 11:24 AM CST      Zithromax  Estimated Onset Date:   Unspecified ; Reactions:   nausea, diarrhea ; Created By:   Nicole Spicer CMA; Reaction Status:   Active ; Category:   Drug ; Substance:   Zithromax ; Type:   Allergy ; Updated By:   Nicole Spicer CMA; Source:   Patient ; Reviewed Date:   1/31/2022 11:24 AM CST        Medication List   (As Of: 1/31/2022 11:34:56 AM CST)   Prescription/Discharge Order    albuterol  :   albuterol ; Status:   Prescribed ; Ordered As Mnemonic:   albuterol 90 mcg/inh inhalation aerosol ; Simple Display Line:   2 puff(s), INH, 1-4x/day, aerochamber with valve, PRN: for wheezing, 17 g, 2 Refill(s) ; Ordering Provider:   Jack Mcfadden MD; Catalog Code:   albuterol ; Order Dt/Tm:   1/28/2021 11:50:06 AM CST          amitriptyline  :   amitriptyline ; Status:   Prescribed ; Ordered As Mnemonic:   amitriptyline 10 mg oral tablet ; Simple Display Line:   20 mg, 2 tab(s), po, hs, fill when needed, 190 tab(s), 3 Refill(s) ; Ordering Provider:   Jack Mcfadden MD; Catalog Code:   amitriptyline ; Order Dt/Tm:   1/28/2021  11:49:21 AM CST          EPINEPHrine  :   EPINEPHrine ; Status:   Prescribed ; Ordered As Mnemonic:   EpiPen 2-Wilver 0.3 mg injectable kit ; Simple Display Line:   0.3 mg, IM, Once, 1 EA, 1 Refill(s) ; Ordering Provider:   Jaxon Reza PA-C; Catalog Code:   EPINEPHrine ; Order Dt/Tm:   1/7/2022 10:37:19 AM CST          famotidine  :   famotidine ; Status:   Prescribed ; Ordered As Mnemonic:   Pepcid 40 mg oral tablet ; Simple Display Line:   40 mg, 1 tab(s), Oral, hs, for 10 day(s), 10 tab(s), 0 Refill(s) ; Ordering Provider:   Jaxon Reza PA-C; Catalog Code:   famotidine ; Order Dt/Tm:   1/7/2022 10:36:19 AM CST          fluticasone-salmeterol  :   fluticasone-salmeterol ; Status:   Prescribed ; Ordered As Mnemonic:   Advair Diskus 250 mcg-50 mcg inhalation powder ; Simple Display Line:   1 puff, Inhale, bid, for 90 day(s), increased dose, 180 blister, 3 Refill(s) ; Ordering Provider:   Jack Mcfadden MD; Catalog Code:   fluticasone-salmeterol ; Order Dt/Tm:   10/20/2021 11:13:26 AM CDT          fluticasone nasal  :   fluticasone nasal ; Status:   Prescribed ; Ordered As Mnemonic:   fluticasone 50 mcg/inh nasal spray ; Simple Display Line:   2 spray(s), nasal, daily, fill when needed, 3 EA, 4 Refill(s) ; Ordering Provider:   Jack Mcfadden MD; Catalog Code:   fluticasone nasal ; Order Dt/Tm:   10/20/2021 11:14:54 AM CDT          hyoscyamine  :   hyoscyamine ; Status:   Prescribed ; Ordered As Mnemonic:   hyoscyamine 0.125 mg oral tablet ; Simple Display Line:   0.125 mg, 1 tab(s), PO, QID, fill when needed, PRN: for loose stool, 10 tab(s), 3 Refill(s) ; Ordering Provider:   Jack Mcfadden MD; Catalog Code:   hyoscyamine ; Order Dt/Tm:   1/28/2021 11:48:43 AM CST          montelukast  :   montelukast ; Status:   Prescribed ; Ordered As Mnemonic:   Singulair 10 mg oral tablet ; Simple Display Line:   10 mg, 1 tab(s), Oral, qpm, 90 tab(s), 3 Refill(s) ; Ordering Provider:   Jack Mcfadden MD; Catalog Code:    montelukast ; Order Dt/Tm:   10/20/2021 11:14:40 AM CDT          pantoprazole  :   pantoprazole ; Status:   Prescribed ; Ordered As Mnemonic:   pantoprazole 40 mg oral delayed release tablet ; Simple Display Line:   1 tab(s), Oral, daily, fill when needed, 95 tab(s), 3 Refill(s) ; Ordering Provider:   Jack Mcfadden MD; Catalog Code:   pantoprazole ; Order Dt/Tm:   1/28/2021 11:49:06 AM CST            Social History   Social History   (As Of: 1/31/2022 11:34:56 AM CST)   Alcohol:        Quit in 1975   (Last Updated: 1/29/2021 10:27:51 AM CST by Peggy Rose)          Tobacco:        Never (less than 100 in lifetime)   (Last Updated: 1/31/2022 11:21:07 AM CST by Sandra Felix CMA)          Electronic Cigarette/Vaping:        Electronic Cigarette Use: Never.   (Last Updated: 1/31/2022 11:21:11 AM CST by Sandra Felix CMA)          Substance Abuse:        Never   (Last Updated: 6/25/2014 4:27:45 PM CDT by Myla Tyler CMA)          Employment/School:        Retired, Work/School description: Ag teacher, [employer]..   (Last Updated: 1/29/2021 10:29:33 AM CST by Peggy Rose)          Home/Environment:        Marital status: Single.  Living situation: Home/Independent.  Injuries/Abuse/Neglect in household: No.  Feels unsafe at home: No.  Family/Friends available for support: Yes.   (Last Updated: 1/29/2021 10:29:24 AM CST by Peggy Rose)          Nutrition/Health:        Diet: Low fat.  Sleeping concerns: No.  Feels highly stressed: No.   (Last Updated: 1/29/2021 10:28:30 AM CST by Peggy Rose)          Exercise:  Occasional exercise      Exercise frequency: 3-4 times/week.  Exercise type: Bicycling, Walking, Weight lifting.   (Last Updated: 1/29/2021 10:28:15 AM CST by Peggy Rose)          Sexual:        Sexually active: No.   Comments:  7/30/2010 9:33 AM - Jessica Vu: .   (Last Updated: 7/30/2010 9:33:28 AM CDT by Jessica Vu)            Geriatric  Depression Screening   Geriatric Depression Satisfied Life :   Yes   Geriatric Depression Dropped Activities :   Yes   Geriatric Depression Life Empty :   No   Geriatric Depression Bored :   No   Geriatric Depression Good Spirits :   Yes   Geriatric Depression Afraid Bad Things :   No   Geriatric Depression Feel Happy :   Yes   Geriatric Depression Feel Helpless :   No   Geriatric Depression Prefer to Stay Home :   Yes   Geriatric Depression Memory Problems :   No   Geriatric Depression Wonderful Be Alive :   Yes   Geriatric Depression Feel Worthless :   No   Geriatric Depression Situation Hopeless :   No   Geriatric Depression Others Better Off :   No   Geriatric Depression Full of Energy :   Yes   Geriatric Depression Total Score :   2    Sandra Felix CMA - 1/31/2022 11:20 AM CST   Hearing and Vision Screening   Audiogram Result Right Ear :   Fail   Audiogram Result Left Ear :   Fail   Hearing Screen Comments :   bilateral hearing aides     Sandra Felix CMA - 1/31/2022 11:20 AM CST   Home Safety Screen   Emergency Numbers Kept/Updated :   Yes   Aware of Smoking Dangers :   Yes   Smoke Alarms/Fire Extinguisher Available :   Yes   Household Members Fire Safety Knowledge :   Yes   Floor Rugs Removed or Fastened :   Yes   Mats in Bathtub/Shower :   Yes   Outdoor Clutter Safety :   Yes   Indoor Clutter Safety :   Yes   Electrical Cord Safety :   Yes   Sadnra Felix CMA - 1/31/2022 11:20 AM CST   Advance Directives   Advance Directive :   No   Sandra Felix CMA - 1/31/2022 11:20 AM CST   Almendarez Fall Risk   History of Fall in Last 3 Months Almendarez :   No   Sandra Felix CMA - 1/31/2022 11:20 AM CST   Functional Assessment   Focused Functional Assessment Grid   Bathing :   Independent (2)   Dressing :   Independent (2)   Toileting :   Independent (2)   Transferring Bed or Chair :   Independent (2)   Continence :   Independent (2)   Feeding :   Independent (2)   Sandra Felix CMA - 1/31/2022 11:20 AM CST   ADL Index  Score :   12    Capable of Shopping :   Yes   Capable of Walking :   Yes   Capable of Housekeeping :   Yes   Capable of Managing Medications :   Yes   Capable of Handling Finances :   Yes   Sandra Felix CMA - 1/31/2022 11:20 AM CST

## 2022-03-07 ENCOUNTER — LAB (OUTPATIENT)
Dept: URGENT CARE | Facility: URGENT CARE | Age: 67
End: 2022-03-07
Attending: FAMILY MEDICINE

## 2022-03-07 ENCOUNTER — VIRTUAL VISIT (OUTPATIENT)
Dept: FAMILY MEDICINE | Facility: CLINIC | Age: 67
End: 2022-03-07
Payer: COMMERCIAL

## 2022-03-07 DIAGNOSIS — J02.9 SORE THROAT: ICD-10-CM

## 2022-03-07 DIAGNOSIS — R05.9 COUGH: Primary | ICD-10-CM

## 2022-03-07 PROBLEM — K58.9 IRRITABLE BOWEL SYNDROME: Status: ACTIVE | Noted: 2022-03-07

## 2022-03-07 PROBLEM — K21.9 GASTROESOPHAGEAL REFLUX DISEASE: Status: ACTIVE | Noted: 2022-03-07

## 2022-03-07 PROBLEM — J45.909 EXTRINSIC ASTHMA: Status: ACTIVE | Noted: 2022-03-07

## 2022-03-07 PROBLEM — K81.9 CHOLECYSTITIS: Status: ACTIVE | Noted: 2022-03-07

## 2022-03-07 PROBLEM — E66.9 OBESITY: Status: ACTIVE | Noted: 2022-03-07

## 2022-03-07 PROBLEM — M85.80 OSTEOPENIA: Status: ACTIVE | Noted: 2022-03-07

## 2022-03-07 PROBLEM — E78.5 DYSLIPIDEMIA: Status: ACTIVE | Noted: 2022-03-07

## 2022-03-07 PROBLEM — Z86.0101 HISTORY OF ADENOMATOUS POLYP OF COLON: Status: ACTIVE | Noted: 2022-03-07

## 2022-03-07 LAB
FLUAV AG SPEC QL IA: NEGATIVE
FLUBV AG SPEC QL IA: NEGATIVE

## 2022-03-07 PROCEDURE — 87804 INFLUENZA ASSAY W/OPTIC: CPT | Mod: QW | Performed by: FAMILY MEDICINE

## 2022-03-07 PROCEDURE — U0003 INFECTIOUS AGENT DETECTION BY NUCLEIC ACID (DNA OR RNA); SEVERE ACUTE RESPIRATORY SYNDROME CORONAVIRUS 2 (SARS-COV-2) (CORONAVIRUS DISEASE [COVID-19]), AMPLIFIED PROBE TECHNIQUE, MAKING USE OF HIGH THROUGHPUT TECHNOLOGIES AS DESCRIBED BY CMS-2020-01-R: HCPCS | Performed by: FAMILY MEDICINE

## 2022-03-07 PROCEDURE — 99213 OFFICE O/P EST LOW 20 MIN: CPT | Mod: TEL | Performed by: FAMILY MEDICINE

## 2022-03-07 PROCEDURE — U0005 INFEC AGEN DETEC AMPLI PROBE: HCPCS | Performed by: FAMILY MEDICINE

## 2022-03-07 RX ORDER — PANTOPRAZOLE SODIUM 40 MG/1
TABLET, DELAYED RELEASE ORAL
COMMUNITY
Start: 2021-01-28 | End: 2022-11-14

## 2022-03-07 RX ORDER — HYOSCYAMINE SULFATE 0.125 MG
0.12 TABLET ORAL
COMMUNITY
Start: 2021-01-28 | End: 2022-11-14

## 2022-03-07 RX ORDER — EPINEPHRINE 0.3 MG/.3ML
0.3 INJECTION SUBCUTANEOUS
COMMUNITY
Start: 2022-01-07 | End: 2023-05-01

## 2022-03-07 RX ORDER — MULTIVIT-MIN/FOLIC ACID/BIOTIN 400-400MCG
1 CAPSULE ORAL DAILY
COMMUNITY
End: 2023-04-06

## 2022-03-07 NOTE — PROGRESS NOTES
Clinical Decision Making:    At the end of the encounter, I discussed results, diagnosis, medications. Discussed red flags for immediate return to clinic/ER, as well as indications for follow up if no improvement. Patient understood and agreed to plan. Patient was stable for discharge.      ICD-10-CM    1. Cough  R05.9 Symptomatic; Yes; 3/6/2022 COVID-19 Virus (Coronavirus) by PCR Nose     Influenza A & B Antigen - Clinic Collect   2. Sore throat  J02.9 Symptomatic; Yes; 3/6/2022 COVID-19 Virus (Coronavirus) by PCR Nose     Influenza A & B Antigen - Clinic Collect     Discussed with the patient with her positive home test that we could do treatment for Covid.  Patient is not feeling very ill and she is not trusting her home Covid results because the kids were not stored at room temperature.  She would like to get a PCR test.  We discussed options for outpatient treatment including  paxlovid or monoclonal antibodies   Patient will think about whether or not she would like to be treated as an outpatient if her PCR comes back positive.  We will test her for Covid and influenza today.        There are no Patient Instructions on file for this visit.   No follow-ups on file.      chief complaint    Telemedicine Visit  Call time: 1:51 PM through 2:07 PM   Patient is at home in [facility]  Physician in clinic in [place], [place]    HPI:  María Elena Whitten is a 66 year old female who presents today complaining of  cough since yesterday, March 6.  She woke up in the early morning with a cough.  The cough has kept her up at night the last 2 nights.  She has a sore throat and right earache.  Her voice is hoarse.  She has had no fevers or chills.  No shortness of breath.  She does use Advair daily and albuterol as needed for allergic asthma.  She took a home Covid test that came back positive with 2 bar immediately.  She then took a second test and that came back negative.  She is not trusting the results because the  boxes state that the kits should be kept at room temperature and they were delivered to her house when it was freezing.  She has been vaccinated to Covid including her booster.  No history of Covid infection.    History obtained from the patient.    Problem List:  2022-03: Extrinsic asthma  2022-03: Gastroesophageal reflux disease  2022-03: History of adenomatous polyp of colon  2022-03: Irritable bowel syndrome  2022-03: Obesity  2022-03: Osteopenia  2022-03: Cholecystitis  2022-03: Dyslipidemia  2013-03: Prediabetes      History reviewed. No pertinent past medical history.    Social History     Tobacco Use     Smoking status: Not on file     Smokeless tobacco: Not on file   Substance Use Topics     Alcohol use: Not on file       Review of systems  negative except listed in HPI    There were no vitals filed for this visit.    Physical Exam  Telemedicine visit  In general she is alert, active, in no acute distress  Breathing not labored

## 2022-03-07 NOTE — PROGRESS NOTES
Pt was seen at [facility] today per Dr. Betina Em for covid and flu testing. Pt informed that we will have the flu results back today and to look in My [employer] for her covid results-will be called if positive.

## 2022-03-08 ENCOUNTER — TELEPHONE (OUTPATIENT)
Dept: NURSING | Facility: CLINIC | Age: 67
End: 2022-03-08
Payer: COMMERCIAL

## 2022-03-08 ENCOUNTER — TELEPHONE (OUTPATIENT)
Dept: FAMILY MEDICINE | Facility: CLINIC | Age: 67
End: 2022-03-08
Payer: COMMERCIAL

## 2022-03-08 LAB — SARS-COV-2 RNA RESP QL NAA+PROBE: POSITIVE

## 2022-03-08 NOTE — TELEPHONE ENCOUNTER
Reason for call:  Covid Positive (results this am 3.8.22)    Symptom or request: Cough, would like plan of care on what to do    Duration (how long have symptoms been present): Jair 3.6.22    Have you been treated for this before? No    Additional comments: Want's to know what to do. Dr. Mcfadden is current PCP but is not here, and she would like to know what to do.    Phone Number patient can be reached at:  Home number on file [phone] (home)    Best Time:  any    Can we leave a detailed message on this number:  YES    Call taken on 3/8/2022 at 11:44 AM by DIANA AGUERO

## 2022-03-08 NOTE — TELEPHONE ENCOUNTER
Coronavirus (COVID-19) Notification    Caller Name (Patient, parent, daughter/son, grandparent, etc)  Patient    Reason for call  Notify of Positive Coronavirus (COVID-19) lab results, assess symptoms,  review [facility] recommendations    Lab Result    Lab test:  2019-nCoV rRt-PCR or SARS-CoV-2 PCR    Oropharyngeal AND/OR nasopharyngeal swabs is POSITIVE for 2019-nCoV RNA/SARS-COV-2 PCR (COVID-19 virus)      Gather patient reported symptoms   Assessment   Current Symptoms at time of phone call, reported by patient: (if no symptoms, document: No symptoms] Cough   Date of symptom(s) onset (if applicable) 3/6     If at time of call, Patients symptoms have worsened, the Patient should contact 911 or have someone drive them to Emergency Dept promptly:      If Patient calling 911, inform 911 personal that you have tested positive for the Coronavirus (COVID-19).  Place mask on and await 911 to arrive.    If Emergency Dept, If possible, please have another adult drive you to the Emergency Dept but you need to wear mask when in contact with other people.      Treatment Options:   Patient classified as COVID treatment eligible by Epic high risk algorithm: Yes  Is the patient symptomatic at the time of result notification? Yes. Was the onset of symptoms within the last 5 days? Yes.   There are now oral medications available for the treatment of COVID-19.  Taking one of these medications within the first five days of symptoms (when people may not yet feel severely ill) has been shown to make people feel better, prevent them from getting sicker, and preventing hospitalization and death.   Does the patient agree to have a visit with a provider to discuss treatment options? No.  Reason patient declined:  Other: Wants to consult with PCP first  You may be eligible to receive a new treatment with a monoclonal antibody for preventing hospitalization in patients at high risk for complications from COVID-19.   This medication  is still experimental and available on a limited basis; it is given through an IV and must be given at an infusion center. Please note that not all people who are eligible will receive the medication since it is in limited supply. Are you interested in being considered for this medication?   No.  Reason patient declined:  Other: Wants to discuss with PCP first    Review information with Patient    Your result was positive. This means you have COVID-19 (coronavirus).    How can I protect others?    These guidelines are for isolating before returning to work, school or .    If you DO have symptoms    Stay home and away from others     For at least 5 days after your symptoms started, AND    You are fever free for 24 hours (with no medicine that reduces fever), AND    Your other symptoms are better    Wear a mask for 10 full days anytime you are around others    If you DON'T have symptoms    Stay home and away from others for at least 5 days after your positive test    Wear a mask for 10 full days anytime you are around others    There may be different guidelines for healthcare facilities.  Please check with the specific sites before arriving.    If you have been told by a doctor that you were severely ill with COVID-19 or are immunocompromised, you should isolate for at least 10 days.    You should not go back to work until you meet the guidelines above for ending your home isolation. You don't need to be retested for COVID-19 before going back to work--studies show that you won't spread the virus if it's been at least 10 days since your symptoms started (or 20 days, if you have a weak immune system).    Employers, schools, and daycares: This is an official notice for this person's medical guidelines for returning in-person.  They must meet the above guidelines before going back to work, school or  in person.    You will receive a positive COVID-19 letter via [employer] or the mail soon with additional  self-care information (exception, no letters will be sent to presurgical/preprocedure patients).    Would you like me to review some of that information with you now?  Yes    How can I take care of myself?      Get lots of rest. Drink extra fluids (unless a doctor has told you not to).      Take Tylenol (acetaminophen) for fever or pain. If you have liver or kidney problems, ask your family doctor if it's okay to take Tylenol.     Take either:     650 mg (two 325 mg pills) every 4 to 6 hours, or     1,000 mg (two 500 mg pills) every 8 hours as needed.     Note: Do not take more than 3,000 mg in one day. Acetaminophen is found in many medicines (both prescribed and over-the-counter medicines). Read all labels to be sure you don't take too much.    For children, check the Tylenol bottle for the right dose (based on their age or weight).      If you have other health problems (like cancer, heart failure, an organ transplant or severe kidney disease): Call your specialty clinic if you don't feel better in the next 2 days.      Know when to call 911: Emergency warning signs include:    Trouble breathing or shortness of breath    Pain or pressure in the chest that doesn't go away    Feeling confused like you haven't felt before, or not being able to wake up    Bluish-colored lips or face        If you were tested for an upcoming procedure, please contact your provider for next steps.    Deirdre Mccurdy

## 2022-03-08 NOTE — TELEPHONE ENCOUNTER
Paxlovid as directed on package for 5 days  Please check with regional pharmacies unsure if any have it

## 2022-03-08 NOTE — RESULT ENCOUNTER NOTE
Relayed MD message. No further action is needed. I let the patient know that we will be contacting her when her Covid results are back.      Deni MASON,KATHERINE

## 2022-03-08 NOTE — TELEPHONE ENCOUNTER
----- Message from Betina Em MD sent at 3/7/2022  3:59 PM CST -----  Please call patient with negative influenza testing results

## 2022-03-09 ENCOUNTER — APPOINTMENT (OUTPATIENT)
Dept: LAB | Facility: CLINIC | Age: 67
End: 2022-03-09
Payer: COMMERCIAL

## 2022-03-09 ENCOUNTER — VIRTUAL VISIT (OUTPATIENT)
Dept: FAMILY MEDICINE | Facility: CLINIC | Age: 67
End: 2022-03-09
Payer: COMMERCIAL

## 2022-03-09 DIAGNOSIS — U07.1 INFECTION DUE TO 2019 NOVEL CORONAVIRUS: Primary | ICD-10-CM

## 2022-03-09 LAB
ALBUMIN SERPL-MCNC: 3.7 G/DL (ref 3.4–5)
ALP SERPL-CCNC: 91 U/L (ref 40–150)
ALT SERPL W P-5'-P-CCNC: 44 U/L (ref 0–50)
ANION GAP SERPL CALCULATED.3IONS-SCNC: 6 MMOL/L (ref 3–14)
AST SERPL W P-5'-P-CCNC: 37 U/L (ref 0–45)
BILIRUB DIRECT SERPL-MCNC: <0.1 MG/DL (ref 0–0.2)
BILIRUB SERPL-MCNC: 0.6 MG/DL (ref 0.2–1.3)
BUN SERPL-MCNC: 15 MG/DL (ref 7–30)
CALCIUM SERPL-MCNC: 9.2 MG/DL (ref 8.5–10.1)
CHLORIDE BLD-SCNC: 108 MMOL/L (ref 94–109)
CO2 SERPL-SCNC: 27 MMOL/L (ref 20–32)
CREAT SERPL-MCNC: 0.85 MG/DL (ref 0.52–1.04)
GFR SERPL CREATININE-BSD FRML MDRD: 75 ML/MIN/1.73M2
GLUCOSE BLD-MCNC: 101 MG/DL (ref 70–99)
POTASSIUM BLD-SCNC: 3.9 MMOL/L (ref 3.4–5.3)
PROT SERPL-MCNC: 7.4 G/DL (ref 6.8–8.8)
SODIUM SERPL-SCNC: 141 MMOL/L (ref 133–144)

## 2022-03-09 PROCEDURE — 82248 BILIRUBIN DIRECT: CPT | Performed by: EMERGENCY MEDICINE

## 2022-03-09 PROCEDURE — 80053 COMPREHEN METABOLIC PANEL: CPT | Performed by: EMERGENCY MEDICINE

## 2022-03-09 PROCEDURE — 36415 COLL VENOUS BLD VENIPUNCTURE: CPT | Performed by: EMERGENCY MEDICINE

## 2022-03-09 PROCEDURE — 99213 OFFICE O/P EST LOW 20 MIN: CPT | Mod: GT | Performed by: EMERGENCY MEDICINE

## 2022-03-09 NOTE — PROGRESS NOTES
Telephone note:  Time start: 1412  Time ended: 1426    History of Present Illness:  María Elena Whitten is a 66 year old female  Had cough with positive Coronavirus test. Cough started on Sunday and symptoms have stayed the same (not increasing)    Current Outpatient Medications   Medication Sig Dispense Refill     albuterol (PROAIR HFA/PROVENTIL HFA/VENTOLIN HFA) 108 (90 Base) MCG/ACT inhaler Inhale 1-2 puffs into the lungs every 4 hours as needed       amitriptyline (ELAVIL) 10 MG tablet Take 10 mg by mouth At Bedtime       CALCIUM-VITAMIN D PO Take 1 tablet by mouth       CINNAMON PO        EPINEPHrine (EPIPEN 2-TEE) 0.3 MG/0.3ML injection 2-pack Inject 0.3 mg into the muscle       fluticasone (FLONASE) 50 MCG/ACT nasal spray Spray 2 sprays in nostril       fluticasone-salmeterol (ADVAIR) 250-50 MCG/DOSE inhaler Inhale 1 puff into the lungs       hyoscyamine (LEVSIN) 0.125 MG tablet Take 0.125 mg by mouth       pantoprazole (PROTONIX) 40 MG EC tablet        Specialty Vitamins Products (VITAMINS FOR HAIR) CAPS Take 1 tablet by mouth daily           There were no vitals taken for this visit.  General: no acute distress  Musculoskeletal: normal gait      Assessment and Plan:    Coronavirus infection: Discussed risks and benefits of Paxlovid..  Her risk factors include weight, asthma and age.  No known liver or kidney problems.  We will have her get a creatinine and liver tests drawn before going to the pharmacy.  We will start out with the regular dose of Paxlovid.  Follow-up with any concerns.

## 2022-03-11 ENCOUNTER — MYC MEDICAL ADVICE (OUTPATIENT)
Dept: FAMILY MEDICINE | Facility: CLINIC | Age: 67
End: 2022-03-11
Payer: COMMERCIAL

## 2022-04-02 ENCOUNTER — MYC MEDICAL ADVICE (OUTPATIENT)
Dept: FAMILY MEDICINE | Facility: CLINIC | Age: 67
End: 2022-04-02
Payer: COMMERCIAL

## 2022-04-05 ENCOUNTER — APPOINTMENT (OUTPATIENT)
Dept: AUDIOLOGY | Facility: CLINIC | Age: 67
End: 2022-04-05
Payer: COMMERCIAL

## 2022-04-11 NOTE — TELEPHONE ENCOUNTER
Please see Rad messages.    Dr. Pacheco - Per 3/9/22 virtual visit with Dr. Mcfadden, this patient was prescribed Paxlovid and is wondering about receiving the second booster vaccination.    Would you please advise.    Thank you,  Alpesh Zaldivar RN  [facility]

## 2022-04-25 ENCOUNTER — OFFICE VISIT (OUTPATIENT)
Dept: FAMILY MEDICINE | Facility: CLINIC | Age: 67
End: 2022-04-25
Payer: COMMERCIAL

## 2022-04-25 VITALS
HEART RATE: 90 BPM | TEMPERATURE: 98.9 F | OXYGEN SATURATION: 99 % | DIASTOLIC BLOOD PRESSURE: 76 MMHG | SYSTOLIC BLOOD PRESSURE: 124 MMHG

## 2022-04-25 DIAGNOSIS — J45.30 MILD PERSISTENT ASTHMA WITHOUT COMPLICATION: Primary | ICD-10-CM

## 2022-04-25 PROCEDURE — 99213 OFFICE O/P EST LOW 20 MIN: CPT | Performed by: EMERGENCY MEDICINE

## 2022-04-25 RX ORDER — GINGER ROOT 550 MG
550 CAPSULE ORAL DAILY
COMMUNITY

## 2022-04-25 RX ORDER — MONTELUKAST SODIUM 10 MG/1
1 TABLET ORAL EVERY EVENING
COMMUNITY
Start: 2022-04-08 | End: 2022-11-14

## 2022-04-25 ASSESSMENT — ASTHMA QUESTIONNAIRES: ACT_TOTALSCORE: 21

## 2022-04-25 NOTE — PROGRESS NOTES
History of Present Illness:  María Elena Whitten is a 66 year old female    Asthma controlled with Advair and increased the dose in 2021 to 250/50. Uses it twice daily during allergy season and otherwise once daily. Uses albuterol inhaler a couple times a year.  Allergic rhinitis currently controlled with allegra (works well in winter), singulair (summer) and flonase. Seen by Allergist. Astelin caused nose bleeds.    Review of Systems:  ACT 21 with no ER visits or hospitalizations from asthma  Arthritis pain in back and right hand      Current Outpatient Medications   Medication Sig Dispense Refill     albuterol (PROAIR HFA/PROVENTIL HFA/VENTOLIN HFA) 108 (90 Base) MCG/ACT inhaler Inhale 1-2 puffs into the lungs every 4 hours as needed       amitriptyline (ELAVIL) 10 MG tablet Take 10 mg by mouth At Bedtime       CALCIUM-VITAMIN D PO Take 1 tablet by mouth       CINNAMON PO        EPINEPHrine (ANY BX GENERIC EQUIV) 0.3 MG/0.3ML injection 2-pack Inject 0.3 mg into the muscle       fluticasone (FLONASE) 50 MCG/ACT nasal spray Spray 2 sprays in nostril       fluticasone-salmeterol (ADVAIR) 250-50 MCG/DOSE inhaler Inhale 1 puff into the lungs       hyoscyamine (LEVSIN) 0.125 MG tablet Take 0.125 mg by mouth       montelukast (SINGULAIR) 10 MG tablet Take 1 tablet by mouth every evening       pantoprazole (PROTONIX) 40 MG EC tablet        Specialty Vitamins Products (VITAMINS FOR HAIR) CAPS Take 1 tablet by mouth daily           /76 (BP Location: Right arm)   Pulse 90   Temp 98.9  F (37.2  C) (Tympanic)   SpO2 99%   General: no acute distress  Musculoskeletal: normal gait  Lungs: clear  Assessment and Plan:    Allergic asthma: continue advair, flonase and albuterol  IBS: fewer problems since starting ginger daily  Osteoarthritis: discussed muscle strengthening exercises

## 2022-04-26 ENCOUNTER — APPOINTMENT (OUTPATIENT)
Dept: AUDIOLOGY | Facility: CLINIC | Age: 67
End: 2022-04-26
Payer: COMMERCIAL

## 2022-10-03 ENCOUNTER — HEALTH MAINTENANCE LETTER (OUTPATIENT)
Age: 67
End: 2022-10-03

## 2022-11-08 ASSESSMENT — ASTHMA QUESTIONNAIRES
ACT_TOTALSCORE: 23
ACT_TOTALSCORE: 23
QUESTION_3 LAST FOUR WEEKS HOW OFTEN DID YOUR ASTHMA SYMPTOMS (WHEEZING, COUGHING, SHORTNESS OF BREATH, CHEST TIGHTNESS OR PAIN) WAKE YOU UP AT NIGHT OR EARLIER THAN USUAL IN THE MORNING: NOT AT ALL
QUESTION_4 LAST FOUR WEEKS HOW OFTEN HAVE YOU USED YOUR RESCUE INHALER OR NEBULIZER MEDICATION (SUCH AS ALBUTEROL): NOT AT ALL
QUESTION_1 LAST FOUR WEEKS HOW MUCH OF THE TIME DID YOUR ASTHMA KEEP YOU FROM GETTING AS MUCH DONE AT WORK, SCHOOL OR AT HOME: NONE OF THE TIME
QUESTION_2 LAST FOUR WEEKS HOW OFTEN HAVE YOU HAD SHORTNESS OF BREATH: ONCE OR TWICE A WEEK
QUESTION_5 LAST FOUR WEEKS HOW WOULD YOU RATE YOUR ASTHMA CONTROL: WELL CONTROLLED

## 2022-11-14 ENCOUNTER — OFFICE VISIT (OUTPATIENT)
Dept: FAMILY MEDICINE | Facility: CLINIC | Age: 67
End: 2022-11-14
Payer: COMMERCIAL

## 2022-11-14 VITALS
TEMPERATURE: 98.8 F | HEART RATE: 90 BPM | OXYGEN SATURATION: 98 % | SYSTOLIC BLOOD PRESSURE: 139 MMHG | DIASTOLIC BLOOD PRESSURE: 78 MMHG | RESPIRATION RATE: 14 BRPM

## 2022-11-14 DIAGNOSIS — J45.30 MILD PERSISTENT EXTRINSIC ASTHMA WITHOUT COMPLICATION: Primary | ICD-10-CM

## 2022-11-14 DIAGNOSIS — Z86.0101 HISTORY OF ADENOMATOUS POLYP OF COLON: ICD-10-CM

## 2022-11-14 DIAGNOSIS — K58.0 IRRITABLE BOWEL SYNDROME WITH DIARRHEA: ICD-10-CM

## 2022-11-14 DIAGNOSIS — Z12.11 COLON CANCER SCREENING: ICD-10-CM

## 2022-11-14 DIAGNOSIS — K21.9 GASTROESOPHAGEAL REFLUX DISEASE WITHOUT ESOPHAGITIS: ICD-10-CM

## 2022-11-14 PROCEDURE — 99214 OFFICE O/P EST MOD 30 MIN: CPT | Performed by: FAMILY MEDICINE

## 2022-11-14 RX ORDER — FLUTICASONE PROPIONATE AND SALMETEROL 250; 50 UG/1; UG/1
1 POWDER RESPIRATORY (INHALATION) 2 TIMES DAILY
Qty: 3 EACH | Refills: 3 | Status: SHIPPED | OUTPATIENT
Start: 2022-11-14 | End: 2023-06-28

## 2022-11-14 RX ORDER — ALBUTEROL SULFATE 90 UG/1
1-2 AEROSOL, METERED RESPIRATORY (INHALATION) EVERY 4 HOURS PRN
Qty: 18 G | Refills: 5 | Status: SHIPPED | OUTPATIENT
Start: 2022-11-14 | End: 2023-09-11

## 2022-11-14 RX ORDER — PANTOPRAZOLE SODIUM 40 MG/1
40 TABLET, DELAYED RELEASE ORAL DAILY
Qty: 90 TABLET | Refills: 3 | Status: SHIPPED | OUTPATIENT
Start: 2022-11-14 | End: 2023-11-20

## 2022-11-14 RX ORDER — HYOSCYAMINE SULFATE 0.125 MG
0.12 TABLET ORAL EVERY 6 HOURS PRN
Qty: 30 TABLET | Refills: 5 | Status: SHIPPED | OUTPATIENT
Start: 2022-11-14 | End: 2023-11-20

## 2022-11-14 RX ORDER — MONTELUKAST SODIUM 10 MG/1
1 TABLET ORAL EVERY EVENING
Qty: 90 TABLET | Refills: 3 | Status: SHIPPED | OUTPATIENT
Start: 2022-11-14 | End: 2023-11-20

## 2022-11-14 RX ORDER — AMITRIPTYLINE HYDROCHLORIDE 10 MG/1
10 TABLET ORAL AT BEDTIME
Qty: 90 TABLET | Refills: 3 | Status: SHIPPED | OUTPATIENT
Start: 2022-11-14 | End: 2023-11-20

## 2022-11-14 RX ORDER — FLUTICASONE PROPIONATE 50 MCG
2 SPRAY, SUSPENSION (ML) NASAL DAILY
Qty: 48 G | Refills: 4 | Status: SHIPPED | OUTPATIENT
Start: 2022-11-14 | End: 2023-11-20

## 2022-11-14 ASSESSMENT — ENCOUNTER SYMPTOMS
EYES NEGATIVE: 1
NEUROLOGICAL NEGATIVE: 1
CARDIOVASCULAR NEGATIVE: 1
MUSCULOSKELETAL NEGATIVE: 1
GASTROINTESTINAL NEGATIVE: 1
RESPIRATORY NEGATIVE: 1
HEMATOLOGIC/LYMPHATIC NEGATIVE: 1
CONSTITUTIONAL NEGATIVE: 1
ALLERGIC/IMMUNOLOGIC NEGATIVE: 1
ENDOCRINE NEGATIVE: 1
PSYCHIATRIC NEGATIVE: 1

## 2022-11-14 NOTE — LETTER
My Asthma Action Plan    Name: María Elena Whitten   YOB: 1955  Date: 11/14/2022   My doctor: Calixto Pacheco MD   My clinic: [facility]        My Control Medicine: Fluticasone propionate + salmeterol (Advair Diskus or Wixela Inhub) -  250/50 mcg twice a day  Montelukast (Singulair) -  10 mg daily  My Rescue Medicine: Albuterol (Proair/Ventolin/Proventil HFA) 2-4 puffs EVERY 4 HOURS as needed. Use a spacer if recommended by your provider.   My Asthma Severity:   Mild Persistent  Know your asthma triggers: smoke, upper respiratory infections and pollens  Pollens            GREEN ZONE   Good Control    I feel good    No cough or wheeze    Can work, sleep and play without asthma symptoms       Take your asthma control medicine every day.     1. If exercise triggers your asthma, take your rescue medication    15 minutes before exercise or sports, and    During exercise if you have asthma symptoms  2. Spacer to use with inhaler: If you have a spacer, make sure to use it with your inhaler             YELLOW ZONE Getting Worse  I have ANY of these:    I do not feel good    Cough or wheeze    Chest feels tight    Wake up at night   1. Keep taking your Green Zone medications  2. Start taking your rescue medicine:    every 20 minutes for up to 1 hour. Then every 4 hours for 24-48 hours.  3. If you stay in the Yellow Zone for more than 12-24 hours, contact your doctor.  4. If you do not return to the Green Zone in 12-24 hours or you get worse, start taking your oral steroid medicine if prescribed by your provider.           RED ZONE Medical Alert - Get Help  I have ANY of these:    I feel awful    Medicine is not helping    Breathing getting harder    Trouble walking or talking    Nose opens wide to breathe       1. Take your rescue medicine NOW  2. If your provider has prescribed an oral steroid medicine, start taking it NOW  3. Call your doctor NOW  4. If you are still in the Red  Zone after 20 minutes and you have not reached your doctor:    Take your rescue medicine again and    Call 911 or go to the emergency room right away    See your regular doctor within 2 weeks of an Emergency Room or Urgent Care visit for follow-up treatment.          Annual Reminders:  Meet with Asthma Educator,  Flu Shot in the Fall, consider Pneumonia Vaccination for patients with asthma (aged 19 and older).    Pharmacy:    [facility] [address] [place] [address] [address]  [place], [place] - [address] [place] [address]    Electronically signed by Calixto Pacheco MD   Date: 11/14/22                      Asthma Triggers  How To Control Things That Make Your Asthma Worse    Triggers are things that make your asthma worse.  Look at the list below to help you find your triggers and what you can do about them.  You can help prevent asthma flare-ups by staying away from your triggers.      Trigger                                                          What you can do   Cigarette Smoke  Tobacco smoke can make asthma worse. Do not allow smoking in your home, car or around you.  Be sure no one smokes at a child s day care or school.  If you smoke, ask your health care provider for ways to help you quit.  Ask family members to quit too.  Ask your health care provider for a referral to Quit Plan to help you quit smoking, or call 4-152-051-PLAN.     Colds, Flu, Bronchitis  These are common triggers of asthma. Wash your hands often.  Don t touch your eyes, nose or mouth.  Get a flu shot every year.     Dust Mites  These are tiny bugs that live in cloth or carpet. They are too small to see. Wash sheets and blankets in hot water every week.   Encase pillows and mattress in dust mite proof covers.  Avoid having carpet if you can. If you have carpet, vacuum weekly.   Use a dust mask and HEPA vacuum.   Pollen and Outdoor Mold  Some people are allergic to trees, grass, or weed pollen, or molds. Try to keep your windows  closed.  Limit time out doors when pollen count is high.   Ask you health care provider about taking medicine during allergy season.     Animal Dander  Some people are allergic to skin flakes, urine or saliva from pets with fur or feathers. Keep pets with fur or feathers out of your home.    If you can t keep the pet outdoors, then keep the pet out of your bedroom.  Keep the bedroom door closed.  Keep pets off cloth furniture and away from stuffed toys.     Mice, Rats, and Cockroaches   Some people are allergic to the waste from these pests.   Cover food and garbage.  Clean up spills and food crumbs.  Store grease in the refrigerator.   Keep food out of the bedroom.   Indoor Mold  This can be a trigger if your home has high moisture. Fix leaking faucets, pipes, or other sources of water.   Clean moldy surfaces.  Dehumidify basement if it is damp and smelly.   Smoke, Strong Odors, and Sprays  These can reduce air quality. Stay away from strong odors and sprays, such as perfume, powder, hair spray, paints, smoke incense, paint, cleaning products, candles and new carpet.   Exercise or Sports  Some people with asthma have this trigger. Be active!  Ask your doctor about taking medicine before sports or exercise to prevent symptoms.    Warm up for 5-10 minutes before and after sports or exercise.     Other Triggers of Asthma  Cold air:  Cover your nose and mouth with a scarf.  Sometimes laughing or crying can be a trigger.  Some medicines and food can trigger asthma.

## 2022-11-14 NOTE — PROGRESS NOTES
"  Assessment & Plan     Mild persistent extrinsic asthma without complication  Asthma is well controlled, ACT controlled.  Asthma action plan updated.  Continue current regimen.  Follow-up annually.  - montelukast (SINGULAIR) 10 MG tablet; Take 1 tablet (10 mg) by mouth every evening  - fluticasone (FLONASE) 50 MCG/ACT nasal spray; Spray 2 sprays in nostril daily  - albuterol (PROAIR HFA/PROVENTIL HFA/VENTOLIN HFA) 108 (90 Base) MCG/ACT inhaler; Inhale 1-2 puffs into the lungs every 4 hours as needed for shortness of breath / dyspnea or wheezing  - fluticasone-salmeterol (ADVAIR DISKUS) 250-50 MCG/ACT inhaler; Inhale 1 puff into the lungs 2 times daily    Gastroesophageal reflux disease without esophagitis  Patient with history of GERD that has been well controlled on current regimen.  We will refill pantoprazole.  No other changes at this time.  - pantoprazole (PROTONIX) 40 MG EC tablet; Take 1 tablet (40 mg) by mouth daily    Irritable bowel syndrome with diarrhea  Overall irritable bowel syndrome has been controlled.  Typically brought on by stress.  Follow-up in 1 year or sooner as needed.  - hyoscyamine (LEVSIN) 0.125 MG tablet; Take 1 tablet (125 mcg) by mouth every 6 hours as needed for cramping  - amitriptyline (ELAVIL) 10 MG tablet; Take 1 tablet (10 mg) by mouth At Bedtime    History of adenomatous polyp of colon  Colon cancer screening  Patient with prior history of colon polyp due for 5-year follow-up.  Prior colonoscopies were done by Dr. Mcfadden.  Discussed options and she would like to have follow-up done at [facility].  Referral is placed.  - Colonoscopy Screening  Referral; Future               BMI:   Estimated body mass index is 46.48 kg/m  as calculated from the following:    Height as of 1/31/22: 1.549 m (5' 1\").    Weight as of 1/31/22: 111.6 kg (246 lb).           No follow-ups on file.   Follow-up Visit   Expected date:  Nov 14, 2023 (Approximate)      Follow Up " Appointment Details:     Follow-up with whom?: Me    Follow-Up for what?: Medicare Wellness    Any Additional Chronic Condition Management?: Asthma    Welcome or Annual?: Annual Wellness    How?: In Person                    Calixto Pacheco MD  [facility]    Triston Ring is a 67 year old accompanied by her self, presenting for the following health issues:  Asthma (Follow-Up)      Prior patient of Dr. Mcfadden's.  Reviewed her history together.  Has had well-controlled asthma.  Current regimen has been working well for her.  Has not needed her EpiPen and is not sure what has ever caused symptoms before.  Patient does also have gastroesophageal reflux disease and irritable bowel syndrome that is primarily causing diarrhea.  Her current medications are working well.  She has tried going off of them and symptoms recur.    History of Present Illness     Asthma:  She presents for follow up of asthma.  She has no cough, no wheezing, and no shortness of breath. She is not using a relief medication. She does not miss any doses of her controller medication throughout the week.Patient is aware of the following triggers: pollens. The patient has not had a visit to the Emergency Room, Urgent Care or Hospital due to asthma since the last clinic visit.     She eats 2-3 servings of fruits and vegetables daily.She consumes 1 sweetened beverage(s) daily.She exercises with enough effort to increase her heart rate 20 to 29 minutes per day.  She exercises with enough effort to increase her heart rate 4 days per week.   She is taking medications regularly.       Asthma Follow-Up    Was ACT completed today?    Yes    ACT Total Scores 11/8/2022   ACT TOTAL SCORE (Goal Greater than or Equal to 20) 23   In the past 12 months, how many times did you visit the emergency room for your asthma without being admitted to the hospital? 0   In the past 12 months, how many times were you hospitalized overnight  because of your asthma? 0          How many days per week do you miss taking your asthma controller medication?  0    Please describe any recent triggers for your asthma: Pollens    Have you had any Emergency Room Visits, Urgent Care Visits, or Hospital Admissions since your last office visit?  No        Review of Systems   Constitutional: Negative.    HENT: Negative.    Eyes: Negative.    Respiratory: Negative.    Cardiovascular: Negative.    Gastrointestinal: Negative.    Endocrine: Negative.    Breasts:  negative.    Genitourinary: Negative.    Musculoskeletal: Negative.    Skin: Negative.    Allergic/Immunologic: Negative.    Neurological: Negative.    Hematological: Negative.    Psychiatric/Behavioral: Negative.             Objective    /78   Pulse 90   Temp 98.8  F (37.1  C) (Oral)   Resp 14   SpO2 98%   There is no height or weight on file to calculate BMI.  Physical Exam   Alert cooperative in no acute distress  HEENT normocephalic atraumatic, TMs are normal in appearance  Neck supple with no lymphadenopathy no thyromegaly  Heart regular rate and rhythm  Lungs clear to auscultation bilaterally

## 2023-02-09 ENCOUNTER — TRANSFERRED RECORDS (OUTPATIENT)
Dept: HEALTH INFORMATION MANAGEMENT | Facility: CLINIC | Age: 68
End: 2023-02-09
Payer: COMMERCIAL

## 2023-04-06 ENCOUNTER — NURSE TRIAGE (OUTPATIENT)
Dept: NURSING | Facility: CLINIC | Age: 68
End: 2023-04-06
Payer: COMMERCIAL

## 2023-04-06 ENCOUNTER — OFFICE VISIT (OUTPATIENT)
Dept: FAMILY MEDICINE | Facility: CLINIC | Age: 68
End: 2023-04-06
Payer: COMMERCIAL

## 2023-04-06 VITALS
DIASTOLIC BLOOD PRESSURE: 83 MMHG | TEMPERATURE: 97.9 F | BODY MASS INDEX: 46.9 KG/M2 | WEIGHT: 248.2 LBS | OXYGEN SATURATION: 100 % | HEART RATE: 84 BPM | SYSTOLIC BLOOD PRESSURE: 146 MMHG | RESPIRATION RATE: 17 BRPM

## 2023-04-06 DIAGNOSIS — R10.32 LLQ ABDOMINAL PAIN: Primary | ICD-10-CM

## 2023-04-06 PROCEDURE — 99213 OFFICE O/P EST LOW 20 MIN: CPT | Performed by: FAMILY MEDICINE

## 2023-04-06 RX ORDER — MULTIPLE VITAMINS W/ MINERALS TAB 9MG-400MCG
1 TAB ORAL DAILY
COMMUNITY

## 2023-04-06 RX ORDER — METRONIDAZOLE 500 MG/1
500 TABLET ORAL 3 TIMES DAILY
Qty: 21 TABLET | Refills: 0 | Status: SHIPPED | OUTPATIENT
Start: 2023-04-06 | End: 2023-04-13

## 2023-04-06 RX ORDER — CIPROFLOXACIN 500 MG/1
500 TABLET, FILM COATED ORAL 2 TIMES DAILY
Qty: 14 TABLET | Refills: 0 | Status: SHIPPED | OUTPATIENT
Start: 2023-04-06 | End: 2023-04-13

## 2023-04-06 NOTE — PROGRESS NOTES
Assessment & Plan     LLQ abdominal pain  Treat as acute diverticulitis  Monitor response, watch for side effects from metronidazole  Return or call if develops f/c/s, increased abdominal pain  - ciprofloxacin (CIPRO) 500 MG tablet; Take 1 tablet (500 mg) by mouth 2 times daily for 7 days  - metroNIDAZOLE (FLAGYL) 500 MG tablet; Take 1 tablet (500 mg) by mouth 3 times daily for 7 days                 Jack Hinson MD  [facility]    Triston Ring is a 67 year old, presenting for the following health issues:  Diverticulitis         4/6/2023    10:49 AM   Additional Questions   Roomed by Vivian PATINO CMA   Accompanied by self     History of Present Illness       Reason for visit:  Diverticulitis    She eats 2-3 servings of fruits and vegetables daily.She consumes 0 sweetened beverage(s) daily.She exercises with enough effort to increase her heart rate 30 to 60 minutes per day.  She exercises with enough effort to increase her heart rate 5 days per week.   She is taking medications regularly.       Here with LLQ pain since this morning.  This is similar to previous episodes of diverticulitis.    No f/c/s, n/v    Review of Systems   Constitutional, HEENT, cardiovascular, pulmonary, gi and gu systems are negative, except as otherwise noted.      Objective    BP (!) 146/83 (BP Location: Right arm)   Pulse 84   Temp 97.9  F (36.6  C)   Resp 17   Wt 112.6 kg (248 lb 3.2 oz)   SpO2 100%   BMI 46.90 kg/m    Body mass index is 46.9 kg/m .  Physical Exam   GENERAL: healthy, alert and no distress  NECK: no adenopathy, no asymmetry, masses, or scars and thyroid normal to palpation  RESP: lungs clear to auscultation - no rales, rhonchi or wheezes  CV: regular rate and rhythm, normal S1 S2, no S3 or S4, no murmur, click or rub, no peripheral edema and peripheral pulses strong  ABDOMEN: soft, mild LLQ tendereness, no rebound tenderness, no hepatosplenomegaly, no masses and bowel sounds  normal  MS: no gross musculoskeletal defects noted, no edema

## 2023-05-01 ENCOUNTER — OFFICE VISIT (OUTPATIENT)
Dept: FAMILY MEDICINE | Facility: CLINIC | Age: 68
End: 2023-05-01
Payer: COMMERCIAL

## 2023-05-01 VITALS
DIASTOLIC BLOOD PRESSURE: 82 MMHG | TEMPERATURE: 98.7 F | HEART RATE: 83 BPM | SYSTOLIC BLOOD PRESSURE: 132 MMHG | OXYGEN SATURATION: 97 %

## 2023-05-01 DIAGNOSIS — Z13.1 SCREENING FOR DIABETES MELLITUS: ICD-10-CM

## 2023-05-01 DIAGNOSIS — Z91.09 ENVIRONMENTAL ALLERGIES: Primary | ICD-10-CM

## 2023-05-01 DIAGNOSIS — Z13.6 SCREENING FOR CARDIOVASCULAR CONDITION: ICD-10-CM

## 2023-05-01 LAB
CHOLEST SERPL-MCNC: 308 MG/DL
FASTING STATUS PATIENT QL REPORTED: ABNORMAL
GLUCOSE SERPL-MCNC: 104 MG/DL (ref 70–99)
HDLC SERPL-MCNC: 63 MG/DL
LDLC SERPL CALC-MCNC: 220 MG/DL
NONHDLC SERPL-MCNC: 245 MG/DL
TRIGL SERPL-MCNC: 127 MG/DL

## 2023-05-01 PROCEDURE — 82947 ASSAY GLUCOSE BLOOD QUANT: CPT | Mod: QW | Performed by: FAMILY MEDICINE

## 2023-05-01 PROCEDURE — 36415 COLL VENOUS BLD VENIPUNCTURE: CPT | Performed by: FAMILY MEDICINE

## 2023-05-01 PROCEDURE — 99213 OFFICE O/P EST LOW 20 MIN: CPT | Performed by: FAMILY MEDICINE

## 2023-05-01 PROCEDURE — 80061 LIPID PANEL: CPT | Performed by: FAMILY MEDICINE

## 2023-05-01 RX ORDER — EPINEPHRINE 0.3 MG/.3ML
0.3 INJECTION SUBCUTANEOUS PRN
Qty: 2 EACH | Refills: 1 | Status: SHIPPED | OUTPATIENT
Start: 2023-05-01

## 2023-05-01 RX ORDER — CETIRIZINE HYDROCHLORIDE 10 MG/1
10 TABLET ORAL DAILY
Qty: 90 TABLET | Refills: 3 | Status: SHIPPED | OUTPATIENT
Start: 2023-05-01 | End: 2024-04-18

## 2023-05-01 RX ORDER — LACTOBACILLUS RHAMNOSUS GG 10B CELL
CAPSULE ORAL
COMMUNITY
Start: 2023-04-03

## 2023-05-01 ASSESSMENT — ASTHMA QUESTIONNAIRES
QUESTION_2 LAST FOUR WEEKS HOW OFTEN HAVE YOU HAD SHORTNESS OF BREATH: ONCE OR TWICE A WEEK
QUESTION_4 LAST FOUR WEEKS HOW OFTEN HAVE YOU USED YOUR RESCUE INHALER OR NEBULIZER MEDICATION (SUCH AS ALBUTEROL): NOT AT ALL
QUESTION_5 LAST FOUR WEEKS HOW WOULD YOU RATE YOUR ASTHMA CONTROL: WELL CONTROLLED
QUESTION_3 LAST FOUR WEEKS HOW OFTEN DID YOUR ASTHMA SYMPTOMS (WHEEZING, COUGHING, SHORTNESS OF BREATH, CHEST TIGHTNESS OR PAIN) WAKE YOU UP AT NIGHT OR EARLIER THAN USUAL IN THE MORNING: NOT AT ALL
ACT_TOTALSCORE: 23
ACT_TOTALSCORE: 23
QUESTION_1 LAST FOUR WEEKS HOW MUCH OF THE TIME DID YOUR ASTHMA KEEP YOU FROM GETTING AS MUCH DONE AT WORK, SCHOOL OR AT HOME: NONE OF THE TIME

## 2023-05-01 NOTE — PROGRESS NOTES
Assessment & Plan   Problem List Items Addressed This Visit    None  Visit Diagnoses     Environmental allergies    -  Primary    Relevant Medications    cetirizine (ZYRTEC) 10 MG tablet    Screening for cardiovascular condition        Relevant Orders    Lipid panel reflex to direct LDL Fasting    Screening for diabetes mellitus        Relevant Orders    Glucose         Patient notes environmental allergies have been getting worse.  On Flonase and Singulair.  Recommend she add Zyrtec.  If that is not effective she will let me know.  Could get her set up to go back to see allergist versus blood work here.  Would likely want to be off of medications prior to doing blood work.    We will also do lipid panel and blood sugar screening for cardiovascular disease and diabetes mellitus.    Next visit is due in November for her annual Medicare physical.                 Calixto Pacheco MD  [facility]    Triston Ring is a 67 year old, presenting for the following health issues:  Recheck Medication        4/6/2023    10:49 AM   Additional Questions   Roomed by Ashley - murray MURPHY   Accompanied by self     History of Present Illness       Reason for visit:  My Chart says I need annual blood work done.  Review colonoscopy results and diverticulitus.    She eats 2-3 servings of fruits and vegetables daily.She consumes 1 sweetened beverage(s) daily.She exercises with enough effort to increase her heart rate 30 to 60 minutes per day.  She exercises with enough effort to increase her heart rate 5 days per week.   She is taking medications regularly.     Patient notes allergies have not felt well controlled.  Now feels like they are year-round.  On Singulair and Flonase.  Has constant drainage from her nose and some itchiness.  Not on an antihistamine.  Also fasting to do routine preventive labs.  Next    Annual exam due in November.            Review of Systems         Objective    /82  (BP Location: Right arm, Patient Position: Sitting, Cuff Size: Adult Large)   Pulse 83   Temp 98.7  F (37.1  C) (Tympanic)   SpO2 97%   There is no height or weight on file to calculate BMI.  Physical Exam

## 2023-05-02 ENCOUNTER — MYC MEDICAL ADVICE (OUTPATIENT)
Dept: FAMILY MEDICINE | Facility: CLINIC | Age: 68
End: 2023-05-02
Payer: COMMERCIAL

## 2023-05-02 DIAGNOSIS — E78.5 DYSLIPIDEMIA: Primary | ICD-10-CM

## 2023-05-03 RX ORDER — ATORVASTATIN CALCIUM 20 MG/1
20 TABLET, FILM COATED ORAL DAILY
Qty: 90 TABLET | Refills: 3 | Status: SHIPPED | OUTPATIENT
Start: 2023-05-03 | End: 2024-04-18

## 2023-05-03 NOTE — TELEPHONE ENCOUNTER
See MyChart from Patient needing PCP review.  Please respond directly to patient, if at all able.    Sent clinical references for diet please fill medication    ELY Champion  [facility]

## 2023-05-16 ENCOUNTER — APPOINTMENT (OUTPATIENT)
Dept: AUDIOLOGY | Facility: CLINIC | Age: 68
End: 2023-05-16
Payer: COMMERCIAL

## 2023-05-20 ENCOUNTER — HEALTH MAINTENANCE LETTER (OUTPATIENT)
Age: 68
End: 2023-05-20

## 2023-06-11 ENCOUNTER — OFFICE VISIT (OUTPATIENT)
Dept: URGENT CARE | Facility: URGENT CARE | Age: 68
End: 2023-06-11
Payer: COMMERCIAL

## 2023-06-11 VITALS
RESPIRATION RATE: 22 BRPM | OXYGEN SATURATION: 96 % | DIASTOLIC BLOOD PRESSURE: 72 MMHG | TEMPERATURE: 98.5 F | SYSTOLIC BLOOD PRESSURE: 126 MMHG | HEART RATE: 95 BPM

## 2023-06-11 DIAGNOSIS — J32.1 FRONTAL SINUSITIS, UNSPECIFIED CHRONICITY: Primary | ICD-10-CM

## 2023-06-11 DIAGNOSIS — J45.21 MILD INTERMITTENT ASTHMA WITH ACUTE EXACERBATION: ICD-10-CM

## 2023-06-11 PROCEDURE — 99214 OFFICE O/P EST MOD 30 MIN: CPT | Performed by: PHYSICIAN ASSISTANT

## 2023-06-11 RX ORDER — LEVALBUTEROL TARTRATE 45 UG/1
2 AEROSOL, METERED ORAL EVERY 4 HOURS PRN
Qty: 15 G | Refills: 11 | Status: SHIPPED | OUTPATIENT
Start: 2023-06-11

## 2023-06-11 RX ORDER — CEFDINIR 300 MG/1
300 CAPSULE ORAL 2 TIMES DAILY
Qty: 20 CAPSULE | Refills: 0 | Status: SHIPPED | OUTPATIENT
Start: 2023-06-11 | End: 2023-06-21

## 2023-06-11 RX ORDER — PREDNISONE 20 MG/1
40 TABLET ORAL DAILY
Qty: 10 TABLET | Refills: 0 | Status: SHIPPED | OUTPATIENT
Start: 2023-06-11 | End: 2023-06-16

## 2023-06-11 NOTE — PATIENT INSTRUCTIONS
Use albuterol (or xopenex) more every 4-6 hours.    Increase your advair to twice daily.    Start prednisone and antibitoic as ordered.    Flonase 2x per day  Nasal saline   Return for persistent or worsening sx.

## 2023-06-11 NOTE — PROGRESS NOTES
Assessment & Plan     Frontal sinusitis, unspecified chronicity  Patient was given Omnicef as ordered.  She does have an exacerbation of her asthma that is mild with normal O2 sats and no tachypnea.  Her lungs are clear currently.  I have asked that she use her albuterol more frequently.  She does experience significant tachycardia and jitteriness with use of albuterol so she tends to avoid using it unless she has very severe symptoms.  Given this I have ordered Xopenex which should limit those side effects.  Recommend nasal saline irrigation.  She is maxed out on her environmental allergy management including Singulair, Zyrtec, and Flonase.  We will have her increase her Flonase to twice a day rather than once a day.  Short course of oral steroids which should be helpful for her severe allergies as well as asthma exacerbation.  She should follow-up for persistent or worsening symptoms.  - cefdinir (OMNICEF) 300 MG capsule  Dispense: 20 capsule; Refill: 0  - predniSONE (DELTASONE) 20 MG tablet  Dispense: 10 tablet; Refill: 0    Mild intermittent asthma with acute exacerbation  As above.  - levalbuterol (XOPENEX HFA) 45 MCG/ACT inhaler  Dispense: 15 g; Refill: 11         Return for 2 weeks if not improved, sooner if worsening..    [facility]  [facility] [place]    Subjective     María Elena is a 67 year old female who presents to clinic today for the following health issues:  Chief Complaint   Patient presents with     URI     Cough with green mucus, congestion since yesterday and getting worse     HPI  Consuelo is a pleasant 67-year-old female with a history of asthma, environmental allergies, GERD, who presents with concerns regarding cough, rhinorrhea, congestion, facial pressure, HA.    Problems with asthma and allergies the last month.    Air quality has been bothersome.  She is needing to wear an N95 frequently.  Yesterday more coughing.   Sleeping upright helpful.    Green sputum with  flecks of blood. No sigificant wheezing but increased sob     HA, some rhinorrhea, congestion which is typical for allergies.   Ears full. Facial pressure.  Nasal discharge can be thick / thin, discolored.    No fevers.    Not using the albuterol much due to it making her feel jittery/anxious/racy heart.    Taking the advair at 1 puff twice daily.      Review of Systems  Constitutional, HEENT, cardiovascular, pulmonary, gi and gu systems are negative, except as otherwise noted.    Patient Active Problem List   Diagnosis     Extrinsic asthma     Gastroesophageal reflux disease     History of adenomatous polyp of colon     Irritable bowel syndrome     Obesity     Osteopenia     Cholecystitis     Dyslipidemia     Prediabetes     Current Outpatient Medications   Medication     albuterol (PROAIR HFA/PROVENTIL HFA/VENTOLIN HFA) 108 (90 Base) MCG/ACT inhaler     amitriptyline (ELAVIL) 10 MG tablet     atorvastatin (LIPITOR) 20 MG tablet     CALCIUM-VITAMIN D PO     cefdinir (OMNICEF) 300 MG capsule     cetirizine (ZYRTEC) 10 MG tablet     CINNAMON PO     EPINEPHrine (ANY BX GENERIC EQUIV) 0.3 MG/0.3ML injection 2-pack     fluticasone (FLONASE) 50 MCG/ACT nasal spray     fluticasone-salmeterol (ADVAIR DISKUS) 250-50 MCG/ACT inhaler     Lupis, Zingiber officinalis, (LUPIS ROOT) 550 MG CAPS capsule     hyoscyamine (LEVSIN) 0.125 MG tablet     lactobacillus rhamnosus, GG, (CULTURELL) capsule     levalbuterol (XOPENEX HFA) 45 MCG/ACT inhaler     montelukast (SINGULAIR) 10 MG tablet     multivitamin w/minerals (THERA-VIT-M) tablet     pantoprazole (PROTONIX) 40 MG EC tablet     predniSONE (DELTASONE) 20 MG tablet     No current facility-administered medications for this visit.           Objective    /72 (BP Location: Right arm, Patient Position: Sitting, Cuff Size: Adult Large)   Pulse 95   Temp 98.5  F (36.9  C) (Tympanic)   Resp 22   LMP  (LMP Unknown)   SpO2 96%   Physical Exam   Pt is in no acute distress and  appears well  Ears patent B:  TM s intact, non-injected. All land marks easily visibile    Nasal mucosa is edematous, pale boggy, mucoid discharge.    Pharynx: non erythematous, tonsils non hypertrophied, No exudate   Neck supple: no adenopathy  Lungs: CTA  Heart: RRR, no murmur, no thrills or heaves   Ext: no edema  Skin: no rashes    TTP maxillary and frontal sinuses.

## 2023-06-14 NOTE — PROGRESS NOTES
PA request for levabuterol tartrate 45 mcg was started through cover my meds 6/14/23 per [facility] orders.  Rojas Alcaraz CMA

## 2023-06-15 ENCOUNTER — TELEPHONE (OUTPATIENT)
Dept: FAMILY MEDICINE | Facility: CLINIC | Age: 68
End: 2023-06-15

## 2023-06-15 NOTE — TELEPHONE ENCOUNTER
fluticasone-salmeterol (ADVAIR DISKUS) 250-50 MCG/ACT inhaler  RX: 11/14/2022  #3 with R-3    LOV: 5/1/2023    Writer requested PA via Epic.

## 2023-06-15 NOTE — TELEPHONE ENCOUNTER
[facility] needs alternative or PA for patient's Advair diskus inhaler due to coverage.    No alternative given

## 2023-06-27 ENCOUNTER — TELEPHONE (OUTPATIENT)
Dept: FAMILY MEDICINE | Facility: CLINIC | Age: 68
End: 2023-06-27
Payer: COMMERCIAL

## 2023-06-27 ENCOUNTER — MYC MEDICAL ADVICE (OUTPATIENT)
Dept: FAMILY MEDICINE | Facility: CLINIC | Age: 68
End: 2023-06-27
Payer: COMMERCIAL

## 2023-06-27 DIAGNOSIS — J45.30 MILD PERSISTENT EXTRINSIC ASTHMA WITHOUT COMPLICATION: Primary | ICD-10-CM

## 2023-06-27 NOTE — TELEPHONE ENCOUNTER
Prior Authorization [employer] Medication Request    Medication/Dose: Advair 250-50mcg    ICD code (if different than what is on RX):    Previously Tried and Failed:    Rationale:      Insurance Name:    Insurance ID:        Pharmacy Information (if different than what is on RX)  Name:   Phone:

## 2023-06-28 RX ORDER — BUDESONIDE AND FORMOTEROL FUMARATE DIHYDRATE 160; 4.5 UG/1; UG/1
2 AEROSOL RESPIRATORY (INHALATION) 2 TIMES DAILY
Qty: 30.6 G | Refills: 3 | Status: SHIPPED | OUTPATIENT
Start: 2023-06-28 | End: 2023-09-11

## 2023-06-28 NOTE — TELEPHONE ENCOUNTER
See MyChart from Patient needing PCP review.  Please respond directly to patient, if at all able.    ELY Champion  [facility]

## 2023-06-30 NOTE — TELEPHONE ENCOUNTER
I called and left voicemail for patient, stating to call the clinic back, prefer to speak over the phone.    If/when patient calls back, please inform her that we ran the prior auth for the Advair and it was approved. We can a send a new script if she would like or wait until she finishes the symbicort this time around.

## 2023-06-30 NOTE — TELEPHONE ENCOUNTER
Prior Authorization Approval    Pharmacy is requiring a new rx to process as it was cancelled on their end when the provider discontinued.    Medication: ADVAIR DISKUS 250-50 MCG/ACT IN AEPB  Authorization Effective Date: 1/1/2023  Authorization Expiration Date: 12/31/2023  Insurance Company: [employer] - Phone [phone] Fax [phone]  Which Pharmacy is filling the prescription: [facility] [address] [place] [address] [address]  Pharmacy Notified: Yes  Patient Notified: Yes

## 2023-07-03 NOTE — TELEPHONE ENCOUNTER
Pt called back to let us know that she is going to finish the Symbicort, but was wondering if we could get an Rx for Advair sent in now so that it's ready to go when the Symbicort runs out.

## 2023-07-03 NOTE — TELEPHONE ENCOUNTER
Phone message left for patient:    Patient MyChart response:      Will route message to care team pool.  Not sure with patient already picking up medication, prior to prior authorization-can a refund be granted?  Or will it have to wait until next time?

## 2023-07-05 RX ORDER — FLUTICASONE PROPIONATE AND SALMETEROL 250; 50 UG/1; UG/1
1 POWDER RESPIRATORY (INHALATION) EVERY 12 HOURS
Qty: 3 EACH | Refills: 3 | Status: SHIPPED | OUTPATIENT
Start: 2023-07-05 | End: 2024-03-25

## 2023-07-05 NOTE — TELEPHONE ENCOUNTER
See mychart encounter, Advair refill was sent to pharmacy for patient's next fill. Encounter closed.

## 2023-09-11 ENCOUNTER — ANCILLARY PROCEDURE (OUTPATIENT)
Dept: GENERAL RADIOLOGY | Facility: CLINIC | Age: 68
End: 2023-09-11
Payer: COMMERCIAL

## 2023-09-11 ENCOUNTER — OFFICE VISIT (OUTPATIENT)
Dept: FAMILY MEDICINE | Facility: CLINIC | Age: 68
End: 2023-09-11
Payer: COMMERCIAL

## 2023-09-11 VITALS
DIASTOLIC BLOOD PRESSURE: 81 MMHG | RESPIRATION RATE: 20 BRPM | OXYGEN SATURATION: 99 % | HEART RATE: 87 BPM | WEIGHT: 248.3 LBS | TEMPERATURE: 99.1 F | BODY MASS INDEX: 46.88 KG/M2 | HEIGHT: 61 IN | SYSTOLIC BLOOD PRESSURE: 140 MMHG

## 2023-09-11 DIAGNOSIS — M25.512 ACUTE PAIN OF LEFT SHOULDER: ICD-10-CM

## 2023-09-11 DIAGNOSIS — J30.89 SEASONAL ALLERGIC RHINITIS DUE TO OTHER ALLERGIC TRIGGER: Primary | ICD-10-CM

## 2023-09-11 PROCEDURE — 73030 X-RAY EXAM OF SHOULDER: CPT | Mod: TC | Performed by: RADIOLOGY

## 2023-09-11 PROCEDURE — 99213 OFFICE O/P EST LOW 20 MIN: CPT

## 2023-09-11 RX ORDER — OLOPATADINE HYDROCHLORIDE 1 MG/ML
1 SOLUTION/ DROPS OPHTHALMIC 2 TIMES DAILY
Qty: 3 ML | Refills: 2 | Status: SHIPPED | OUTPATIENT
Start: 2023-09-11 | End: 2023-12-10

## 2023-09-11 ASSESSMENT — ENCOUNTER SYMPTOMS: WHEEZING: 1

## 2023-09-11 NOTE — PROGRESS NOTES
"  Assessment & Plan     Seasonal allergic rhinitis due to other allergic trigger  Patient having itching of eyes and ears with year-round allergies.  We will try eyedrops to see if this helps relieve itching.  Patient advised she may use over-the-counter eardrops for her ears.  - olopatadine (PATANOL) 0.1 % ophthalmic solution; Place 1 drop into both eyes 2 times daily for 90 days    Acute pain of left shoulder  Pain with left shoulder with no known injury, pain started a few weeks ago.  She reports she does repetitive movements with gardening.  Pain with palpation around top of shoulder, also has pain with posterior rotation and anterior rotation.  No arm drop noted on exam.  We will do an x-ray today to check for gross abnormalities and patient referred to physical therapy.  We discussed if this does not help her shoulder pain to be seen by Ortho would be next step.  - Physical Therapy Referral; Future  - XR Shoulder Left G/E 3 Views; Future     BMI:   Estimated body mass index is 46.92 kg/m  as calculated from the following:    Height as of this encounter: 1.549 m (5' 1\").    Weight as of this encounter: 112.6 kg (248 lb 4.8 oz).     RAMÓN Duarte CNP  [facility]    Triston Ring is a 68 year old, presenting for the following health issues:  Shoulder Pain (Left shoulder pain x 1 week ) and Allergies (Allergy symptoms x 2 weeks )        9/11/2023     2:49 PM   Additional Questions   Roomed by TRIP Merida       Shoulder Pain    Allergies    History of Present Illness       Reason for visit:  Shoulder pain  Symptom onset:  3-7 days ago  Symptoms include:  Pain when moving arm, especially behind my back  Symptom intensity:  Moderate  Symptom progression:  Staying the same  Had these symptoms before:  No    She eats 2-3 servings of fruits and vegetables daily.She consumes 0 sweetened beverage(s) daily.She exercises with enough effort to increase her heart rate 20 to 29 " "minutes per day.  She exercises with enough effort to increase her heart rate 4 days per week.   She is taking medications regularly.        Review of Systems   Respiratory:  Positive for wheezing.       Constitutional, HEENT, cardiovascular, pulmonary, gi and gu systems are negative, except as otherwise noted.      Objective    BP (!) 140/81 (BP Location: Right arm, Patient Position: Sitting, Cuff Size: Adult Large)   Pulse 87   Temp 99.1  F (37.3  C) (Tympanic)   Resp 20   Ht 1.549 m (5' 1\")   Wt 112.6 kg (248 lb 4.8 oz)   LMP  (LMP Unknown)   SpO2 99%   BMI 46.92 kg/m    Body mass index is 46.92 kg/m .  Physical Exam   GENERAL: healthy, alert and no distress  NECK: no adenopathy, no asymmetry, masses, or scars and thyroid normal to palpation  RESP: lungs clear to auscultation - no rales, rhonchi or wheezes  CV: regular rate and rhythm, normal S1 S2, no S3 or S4, no murmur, click or rub, no peripheral edema and peripheral pulses strong  MS: no gross musculoskeletal defects noted, no edema                      "

## 2023-09-22 ENCOUNTER — TRANSFERRED RECORDS (OUTPATIENT)
Dept: HEALTH INFORMATION MANAGEMENT | Facility: CLINIC | Age: 68
End: 2023-09-22
Payer: COMMERCIAL

## 2023-10-14 ENCOUNTER — OFFICE VISIT (OUTPATIENT)
Dept: URGENT CARE | Facility: URGENT CARE | Age: 68
End: 2023-10-14
Payer: COMMERCIAL

## 2023-10-14 VITALS
TEMPERATURE: 97.3 F | HEART RATE: 85 BPM | OXYGEN SATURATION: 100 % | SYSTOLIC BLOOD PRESSURE: 152 MMHG | DIASTOLIC BLOOD PRESSURE: 81 MMHG | RESPIRATION RATE: 18 BRPM

## 2023-10-14 DIAGNOSIS — J01.00 ACUTE NON-RECURRENT MAXILLARY SINUSITIS: Primary | ICD-10-CM

## 2023-10-14 PROCEDURE — 99213 OFFICE O/P EST LOW 20 MIN: CPT | Performed by: PHYSICIAN ASSISTANT

## 2023-10-14 RX ORDER — CEFDINIR 300 MG/1
300 CAPSULE ORAL 2 TIMES DAILY
Qty: 20 CAPSULE | Refills: 0 | Status: SHIPPED | OUTPATIENT
Start: 2023-10-14 | End: 2023-10-24

## 2023-10-14 ASSESSMENT — PAIN SCALES - GENERAL: PAINLEVEL: MILD PAIN (2)

## 2023-10-14 NOTE — PROGRESS NOTES
Assessment & Plan     Acute non-recurrent maxillary sinusitis  Omnicef as ordered.  Discussed possible cross sensitivity given allergy to penicillins with rash.  She will monitor for any side effects and contact us if she has any.  Continue nasal saline irrigation and nasal steroids, continue environmental allergy treatment with her H1 blocker and Singulair as well as Flonase.  Follow-up for persistent or worsening symptoms.  - cefdinir (OMNICEF) 300 MG capsule  Dispense: 20 capsule; Refill: 0             No follow-ups on file.    [facility] [place]    Triston Ring is a 68 year old female who presents to clinic today for the following health issues:  Chief Complaint   Patient presents with    Nasal Congestion     And sinus pressure    Cough     And PND.    Eye Problem     Itchy eyes     HPI patient is a pleasant 68-year-old female with a history of asthma and environmental allergies who presents to the clinic with concerns regarding persistent sinus pressure and URI symptoms.  She has maxed out on all of her environmental allergy treatment regimens and is still having some breakthrough symptoms for several weeks.  Most recently symptoms seem to be progressing with a change in her nasal discharge sputum too thick and purulent.    Worsening over weeks. Drainage, cough, productive.  She has sinus pressure and discomfort.  Sore tongue.  Breakthrough sx despite staying inside.    Needing rescue inhaler more frequently 1 x per day.  No chest pain, occasional sob.  Discharge cloudy now, usually clear.    Facial fullness.  No fevers.          Review of Systems  Constitutional, HEENT, cardiovascular, pulmonary, gi and gu systems are negative, except as otherwise noted.      Objective    BP (!) 152/81   Pulse 85   Temp 97.3  F (36.3  C) (Oral)   Resp 18   LMP  (LMP Unknown)   SpO2 100%   Breastfeeding No   Physical Exam   Pt is in no acute distress and appears  well  Ears patent B:  TM s intact, non-injected. All land marks easily visibile    Nasal mucosa is edematous, pale and boggy with mucoid discharge.  Tenderness to palpation of the maxillary sinuses.  Pharynx: non erythematous, tonsils non hypertrophied, No exudate   Neck supple: no adenopathy  Lungs: CTA  Heart: RRR, no murmur, no thrills or heaves   Ext: no edema  Skin: no rashes

## 2023-11-13 ASSESSMENT — ENCOUNTER SYMPTOMS
NERVOUS/ANXIOUS: 1
SHORTNESS OF BREATH: 0
SORE THROAT: 0
JOINT SWELLING: 0
MYALGIAS: 0
HEADACHES: 0
NAUSEA: 0
EYE PAIN: 0
FEVER: 0
COUGH: 0
CONSTIPATION: 0
PALPITATIONS: 0
ARTHRALGIAS: 1
DYSURIA: 0
DIARRHEA: 0
ABDOMINAL PAIN: 0
DIZZINESS: 0
WEAKNESS: 0
PARESTHESIAS: 0
BREAST MASS: 0
HEMATOCHEZIA: 0
HEARTBURN: 0
CHILLS: 0
HEMATURIA: 0
FREQUENCY: 0

## 2023-11-13 ASSESSMENT — ACTIVITIES OF DAILY LIVING (ADL): CURRENT_FUNCTION: NO ASSISTANCE NEEDED

## 2023-11-13 ASSESSMENT — ASTHMA QUESTIONNAIRES: ACT_TOTALSCORE: 22

## 2023-11-13 NOTE — COMMUNITY RESOURCES LIST (ENGLISH)
11/13/2023   [facility] [employer]  N/A  For questions about this resource list or additional care needs, please contact your primary care clinic or care manager.  Phone: [phone]   Email: N/A   Address: [address] [place] [address]   Hours: N/A        Financial Stability       Utility payment assistance  1  [facility] Service Extension Unit - Hotline Distance: 15.16 miles      Phone/Virtual   [address] [place] [address]  Language: English  Hours: Mon - Sun Open 24 Hours  Fees: Free   Phone: (605) 818-8134 Website: [email or web address]://[facility].org/wu/[facility]-service-extension/     2  [facility] Home Energy Assistance Program (WHEAP) Distance: 15.16 miles      Phone/Virtual   [address] [place] [address]  Language: English, Kazakh  Hours: Mon - Fri 8:00 AM - 4:30 PM  Fees: Free   Phone: (163) 722-4384 Website: https://www.[facility].wi.us/departments/human_services/index.php          Important Numbers & Websites       Emergency Services   [address] [place] [address]  Poison Control   (856) 291-9614  Suicide Prevention Lifeline   (725) 379-9365 (TALK)  Child Abuse Hotline   (208) 563-9385 (4-A-Child)  Sexual Assault Hotline   (703) 947-3055 (HOPE)  National Runaway Safeline   (174) 685-5965 (RUNAWAY)  All-Options Talkline   (639) 504-8085  Substance Abuse Referral   (186) 140-1577 (HELP)

## 2023-11-19 NOTE — COMMUNITY RESOURCES LIST (ENGLISH)
11/19/2023   [facility] [employer]  N/A  For questions about this resource list or additional care needs, please contact your primary care clinic or care manager.  Phone: [phone]   Email: N/A   Address: [address] [place] [address]   Hours: N/A        Financial Stability       Utility payment assistance  1  [facility] Service Extension Unit - Hotline Distance: 15.16 miles      Phone/Virtual   [address] [place] [address]  Language: English  Hours: Mon - Sun Open 24 Hours  Fees: Free   Phone: (615) 104-4632 Website: [email or web address]/wu/[facility]-service-extension/     2  [facility] Home Energy Assistance Program (WHEAP) Distance: 15.16 miles      Phone/Virtual   [address] [place] [address]  Language: English, Macedonian  Hours: Mon - Fri 8:00 AM - 4:30 PM  Fees: Free   Phone: (110) 146-5747 Website: https://www.[facility].wi.us/departments/human_services/index.php          Important Numbers & Websites       Emergency Services   [address]  [place] [address]  Poison Control   (777) 635-3052  Suicide Prevention Lifeline   (405) 917-4252 (TALK)  Child Abuse Hotline   (331) 727-1745 (4-A-Child)  Sexual Assault Hotline   (648) 807-7348 (HOPE)  National Runaway Safeline   (892) 886-5032 (RUNAWAY)  All-Options Talkline   (152) 731-5199  Substance Abuse Referral   (940) 959-9966 (HELP)

## 2023-11-20 ENCOUNTER — OFFICE VISIT (OUTPATIENT)
Dept: FAMILY MEDICINE | Facility: CLINIC | Age: 68
End: 2023-11-20
Attending: FAMILY MEDICINE
Payer: COMMERCIAL

## 2023-11-20 VITALS
OXYGEN SATURATION: 95 % | DIASTOLIC BLOOD PRESSURE: 80 MMHG | BODY MASS INDEX: 45.69 KG/M2 | WEIGHT: 242 LBS | HEART RATE: 87 BPM | HEIGHT: 61 IN | SYSTOLIC BLOOD PRESSURE: 130 MMHG | RESPIRATION RATE: 16 BRPM | TEMPERATURE: 98.4 F

## 2023-11-20 DIAGNOSIS — E66.813 CLASS 3 OBESITY: ICD-10-CM

## 2023-11-20 DIAGNOSIS — M25.551 LATERAL PAIN OF RIGHT HIP: ICD-10-CM

## 2023-11-20 DIAGNOSIS — Z00.00 ENCOUNTER FOR MEDICARE ANNUAL WELLNESS EXAM: Primary | ICD-10-CM

## 2023-11-20 DIAGNOSIS — K21.9 GASTROESOPHAGEAL REFLUX DISEASE WITHOUT ESOPHAGITIS: ICD-10-CM

## 2023-11-20 DIAGNOSIS — K58.0 IRRITABLE BOWEL SYNDROME WITH DIARRHEA: ICD-10-CM

## 2023-11-20 DIAGNOSIS — J45.30 MILD PERSISTENT EXTRINSIC ASTHMA WITHOUT COMPLICATION: ICD-10-CM

## 2023-11-20 PROCEDURE — 99214 OFFICE O/P EST MOD 30 MIN: CPT | Mod: 25 | Performed by: FAMILY MEDICINE

## 2023-11-20 PROCEDURE — G0439 PPPS, SUBSEQ VISIT: HCPCS | Performed by: FAMILY MEDICINE

## 2023-11-20 RX ORDER — HYOSCYAMINE SULFATE 0.125 MG
0.12 TABLET ORAL EVERY 6 HOURS PRN
Qty: 30 TABLET | Refills: 5 | Status: SHIPPED | OUTPATIENT
Start: 2023-11-20

## 2023-11-20 RX ORDER — FLUTICASONE PROPIONATE 50 MCG
2 SPRAY, SUSPENSION (ML) NASAL DAILY
Qty: 48 G | Refills: 4 | Status: SHIPPED | OUTPATIENT
Start: 2023-11-20 | End: 2024-04-11

## 2023-11-20 RX ORDER — MONTELUKAST SODIUM 10 MG/1
1 TABLET ORAL EVERY EVENING
Qty: 90 TABLET | Refills: 3 | Status: SHIPPED | OUTPATIENT
Start: 2023-11-20

## 2023-11-20 RX ORDER — AMITRIPTYLINE HYDROCHLORIDE 10 MG/1
10 TABLET ORAL AT BEDTIME
Qty: 90 TABLET | Refills: 3 | Status: SHIPPED | OUTPATIENT
Start: 2023-11-20

## 2023-11-20 RX ORDER — PANTOPRAZOLE SODIUM 40 MG/1
40 TABLET, DELAYED RELEASE ORAL DAILY
Qty: 90 TABLET | Refills: 3 | Status: SHIPPED | OUTPATIENT
Start: 2023-11-20

## 2023-11-20 ASSESSMENT — ENCOUNTER SYMPTOMS
COUGH: 0
DIZZINESS: 0
NAUSEA: 0
FEVER: 0
ABDOMINAL PAIN: 0
CONSTIPATION: 0
NERVOUS/ANXIOUS: 1
MYALGIAS: 0
HEMATOCHEZIA: 0
DYSURIA: 0
HEMATURIA: 0
HEADACHES: 0
PALPITATIONS: 0
ARTHRALGIAS: 1
EYE PAIN: 0
JOINT SWELLING: 0
BREAST MASS: 0
WEAKNESS: 0
HEARTBURN: 0
SHORTNESS OF BREATH: 0
FREQUENCY: 0
DIARRHEA: 0
PARESTHESIAS: 0
CHILLS: 0
SORE THROAT: 0

## 2023-11-20 ASSESSMENT — ACTIVITIES OF DAILY LIVING (ADL): CURRENT_FUNCTION: NO ASSISTANCE NEEDED

## 2023-11-20 NOTE — COMMUNITY RESOURCES LIST (ENGLISH)
11/20/2023   [facility] [employer]  N/A  For questions about this resource list or additional care needs, please contact your primary care clinic or care manager.  Phone: [phone]   Email: N/A   Address: [address] [place] [address]   Hours: N/A        Financial Stability       Utility payment assistance  1  [facility] Service Extension Unit - Hotline Distance: 15.16 miles      Phone/Virtual   [address] [place] [address]  Language: English  Hours: Mon - Sun Open 24 Hours  Fees: Free   Phone: (717) 372-8735 Website: https://[email or web address]/wu/[facility]-service-extension/     2  [facility] Home Energy Assistance Program (WHEAP) Distance: 15.16 miles      Phone/Virtual   [address] [place] [address]  Language: English, Nepali  Hours: Mon - Fri 8:00 AM - 4:30 PM  Fees: Free   Phone: (229) 590-1373 Website: https://www.[facility].wi.us/departments/human_services/index.php          Important Numbers & Websites       Emergency Services   [address]  [place] [address]  Poison Control   (455) 426-7542  Suicide Prevention Lifeline   (183) 510-7981 (TALK)  Child Abuse Hotline   (461) 954-3743 (4-A-Child)  Sexual Assault Hotline   (179) 388-1338 (HOPE)  National Runaway Safeline   (525) 749-7843 (RUNAWAY)  All-Options Talkline   (914) 874-2778  Substance Abuse Referral   (770) 229-5855 (HELP)

## 2023-11-20 NOTE — PATIENT INSTRUCTIONS
Patient Education   Personalized Prevention Plan  You are due for the preventive services outlined below.  Your care team is available to assist you in scheduling these services.  If you have already completed any of these items, please share that information with your care team to update in your medical record.  Health Maintenance Due   Topic Date Due     Asthma Action Plan - yearly  11/14/2023     Bladder Training: Care Instructions  Your Care Instructions     Bladder training is used to treat urge incontinence and stress incontinence. Urge incontinence means that the need to urinate comes on so fast that you can't get to a toilet in time. Stress incontinence means that you leak urine because of pressure on your bladder. For example, it may happen when you laugh, cough, or lift something heavy.  Bladder training can increase how long you can wait before you have to urinate. It can also help your bladder hold more urine. And it can give you better control over the urge to urinate.  It is important to remember that bladder training takes a few weeks to a few months to make a difference. You may not see results right away, but don't give up.  Follow-up care is a key part of your treatment and safety. Be sure to make and go to all appointments, and call your doctor if you are having problems. It's also a good idea to know your test results and keep a list of the medicines you take.  How can you care for yourself at home?  Work with your doctor to come up with a bladder training program that is right for you. You may use one or more of the following methods.  Delayed urination  In the beginning, try to keep from urinating for 5 minutes after you first feel the need to go.  While you wait, take deep, slow breaths to relax. Kegel exercises can also help you delay the need to go to the bathroom.  After some practice, when you can easily wait 5 minutes to urinate, try to wait 10 minutes before you urinate.  Slowly increase  "the waiting period until you are able to control when you have to urinate.  Scheduled urination  Empty your bladder when you first wake up in the morning.  Schedule times throughout the day when you will urinate.  Start by going to the bathroom every hour, even if you don't need to go.  Slowly increase the time between trips to the bathroom.  When you have found a schedule that works well for you, keep doing it.  If you wake up during the night and have to urinate, do it. Apply your schedule to waking hours only.  Kegel exercises  These tighten and strengthen pelvic muscles, which can help you control the flow of urine. (If doing these exercises causes pain, stop doing them and talk with your doctor.) To do Kegel exercises:  Squeeze your muscles as if you were trying not to pass gas. Or squeeze your muscles as if you were stopping the flow of urine. Your belly, legs, and buttocks shouldn't move.  Hold the squeeze for 3 seconds, then relax for 5 to 10 seconds.  Start with 3 seconds, then add 1 second each week until you are able to squeeze for 10 seconds.  Repeat the exercise 10 times a session. Do 3 to 8 sessions a day.  When should you call for help?  Watch closely for changes in your health, and be sure to contact your doctor if:    Your incontinence is getting worse.     You do not get better as expected.   Where can you learn more?  Go to [email or web address]  Enter V684 in the search box to learn more about \"Bladder Training: Care Instructions.\"  Current as of: February 28, 2023               Content Version: 13.8    [phone] [employer].   Care instructions adapted under license by your healthcare professional. If you have questions about a medical condition or this instruction, always ask your healthcare professional. [employer] disclaims any warranty or liability for your use of this information.         "

## 2023-11-21 ENCOUNTER — APPOINTMENT (OUTPATIENT)
Dept: AUDIOLOGY | Facility: CLINIC | Age: 68
End: 2023-11-21
Payer: COMMERCIAL

## 2023-11-22 ENCOUNTER — PATIENT OUTREACH (OUTPATIENT)
Dept: GASTROENTEROLOGY | Facility: CLINIC | Age: 68
End: 2023-11-22
Payer: COMMERCIAL

## 2023-12-15 ENCOUNTER — TRANSFERRED RECORDS (OUTPATIENT)
Dept: HEALTH INFORMATION MANAGEMENT | Facility: CLINIC | Age: 68
End: 2023-12-15
Payer: COMMERCIAL

## 2024-01-03 ENCOUNTER — MYC MEDICAL ADVICE (OUTPATIENT)
Dept: FAMILY MEDICINE | Facility: CLINIC | Age: 69
End: 2024-01-03
Payer: COMMERCIAL

## 2024-02-06 ENCOUNTER — MYC MEDICAL ADVICE (OUTPATIENT)
Dept: FAMILY MEDICINE | Facility: CLINIC | Age: 69
End: 2024-02-06
Payer: COMMERCIAL

## 2024-03-25 ENCOUNTER — MYC MEDICAL ADVICE (OUTPATIENT)
Dept: FAMILY MEDICINE | Facility: CLINIC | Age: 69
End: 2024-03-25
Payer: COMMERCIAL

## 2024-03-25 DIAGNOSIS — J45.40 MODERATE PERSISTENT EXTRINSIC ASTHMA WITHOUT COMPLICATION: Primary | ICD-10-CM

## 2024-03-25 NOTE — TELEPHONE ENCOUNTER
Routing message to PCP to advise on alternate for Advair.   Unsure if received fax from pharmacy re: advair    Advair last written 7/5/23 qty 3 with 3 refills  Last OV 11/20/23 physical K[facility]

## 2024-03-26 ENCOUNTER — TELEPHONE (OUTPATIENT)
Dept: FAMILY MEDICINE | Facility: CLINIC | Age: 69
End: 2024-03-26
Payer: COMMERCIAL

## 2024-03-26 ENCOUNTER — MYC MEDICAL ADVICE (OUTPATIENT)
Dept: FAMILY MEDICINE | Facility: CLINIC | Age: 69
End: 2024-03-26
Payer: COMMERCIAL

## 2024-03-26 DIAGNOSIS — J45.40 MODERATE PERSISTENT EXTRINSIC ASTHMA WITHOUT COMPLICATION: Primary | ICD-10-CM

## 2024-03-26 RX ORDER — FLUTICASONE FUROATE AND VILANTEROL 200; 25 UG/1; UG/1
1 POWDER RESPIRATORY (INHALATION) DAILY
Qty: 3 EACH | Refills: 3 | Status: SHIPPED | OUTPATIENT
Start: 2024-03-26

## 2024-03-26 NOTE — TELEPHONE ENCOUNTER
Retail Pharmacy Prior Authorization Team   Phone: [phone]    PA team received denial letter via fax from [facility] for patient's Dulera. I do not see that the PA team had submitted the request therefore all future correspondence/appeals are to be handled by the provider/care team.  Thank you    Must try/fail Breo Ellipta -       Appeal information -

## 2024-04-18 DIAGNOSIS — E78.5 DYSLIPIDEMIA: ICD-10-CM

## 2024-04-18 DIAGNOSIS — Z91.09 ENVIRONMENTAL ALLERGIES: ICD-10-CM

## 2024-04-18 RX ORDER — ATORVASTATIN CALCIUM 20 MG/1
20 TABLET, FILM COATED ORAL DAILY
Qty: 90 TABLET | Refills: 1 | Status: SHIPPED | OUTPATIENT
Start: 2024-04-18

## 2024-04-18 RX ORDER — CETIRIZINE HYDROCHLORIDE 10 MG/1
10 TABLET, FILM COATED ORAL DAILY
Qty: 90 TABLET | Refills: 3 | Status: SHIPPED | OUTPATIENT
Start: 2024-04-18

## 2024-04-22 ENCOUNTER — OFFICE VISIT (OUTPATIENT)
Dept: FAMILY MEDICINE | Facility: CLINIC | Age: 69
End: 2024-04-22
Payer: COMMERCIAL

## 2024-04-22 VITALS
SYSTOLIC BLOOD PRESSURE: 128 MMHG | HEIGHT: 61 IN | WEIGHT: 249 LBS | DIASTOLIC BLOOD PRESSURE: 76 MMHG | RESPIRATION RATE: 18 BRPM | OXYGEN SATURATION: 99 % | BODY MASS INDEX: 47.01 KG/M2 | TEMPERATURE: 98.2 F | HEART RATE: 84 BPM

## 2024-04-22 DIAGNOSIS — Z12.11 SCREEN FOR COLON CANCER: Primary | ICD-10-CM

## 2024-04-22 DIAGNOSIS — Z01.818 PRE-OP EXAM: ICD-10-CM

## 2024-04-22 LAB
ANION GAP SERPL CALCULATED.3IONS-SCNC: 11 MMOL/L (ref 7–15)
BUN SERPL-MCNC: 15.4 MG/DL (ref 8–23)
CALCIUM SERPL-MCNC: 9.4 MG/DL (ref 8.8–10.2)
CHLORIDE SERPL-SCNC: 106 MMOL/L (ref 98–107)
CREAT SERPL-MCNC: 0.79 MG/DL (ref 0.51–0.95)
DEPRECATED HCO3 PLAS-SCNC: 25 MMOL/L (ref 22–29)
EGFRCR SERPLBLD CKD-EPI 2021: 81 ML/MIN/1.73M2
ERYTHROCYTE [DISTWIDTH] IN BLOOD BY AUTOMATED COUNT: 13.9 % (ref 10–15)
GLUCOSE SERPL-MCNC: 117 MG/DL (ref 70–99)
HCT VFR BLD AUTO: 40.3 % (ref 35–47)
HGB BLD-MCNC: 13 G/DL (ref 11.7–15.7)
MCH RBC QN AUTO: 29.3 PG (ref 26.5–33)
MCHC RBC AUTO-ENTMCNC: 32.3 G/DL (ref 31.5–36.5)
MCV RBC AUTO: 91 FL (ref 78–100)
PLATELET # BLD AUTO: 183 10E3/UL (ref 150–450)
POTASSIUM SERPL-SCNC: 3.9 MMOL/L (ref 3.4–5.3)
RBC # BLD AUTO: 4.44 10E6/UL (ref 3.8–5.2)
SODIUM SERPL-SCNC: 142 MMOL/L (ref 135–145)
WBC # BLD AUTO: 4 10E3/UL (ref 4–11)

## 2024-04-22 PROCEDURE — 93000 ELECTROCARDIOGRAM COMPLETE: CPT | Performed by: PHYSICIAN ASSISTANT

## 2024-04-22 PROCEDURE — 85027 COMPLETE CBC AUTOMATED: CPT | Performed by: PHYSICIAN ASSISTANT

## 2024-04-22 PROCEDURE — 36415 COLL VENOUS BLD VENIPUNCTURE: CPT | Performed by: PHYSICIAN ASSISTANT

## 2024-04-22 PROCEDURE — 80048 BASIC METABOLIC PNL TOTAL CA: CPT | Performed by: PHYSICIAN ASSISTANT

## 2024-04-22 PROCEDURE — 99213 OFFICE O/P EST LOW 20 MIN: CPT | Mod: 25 | Performed by: PHYSICIAN ASSISTANT

## 2024-04-22 ASSESSMENT — ENCOUNTER SYMPTOMS
ENDOCRINE NEGATIVE: 1
CONSTITUTIONAL NEGATIVE: 1
PSYCHIATRIC NEGATIVE: 1
HEMATOLOGIC/LYMPHATIC NEGATIVE: 1
NEUROLOGICAL NEGATIVE: 1
EYES NEGATIVE: 1
RESPIRATORY NEGATIVE: 1
CARDIOVASCULAR NEGATIVE: 1
GASTROINTESTINAL NEGATIVE: 1

## 2024-04-22 NOTE — PROGRESS NOTES
Preoperative Evaluation  [address] [address]  Phone: [phone]  Fax: [phone]  Primary Provider: Calixto Pacheco  Pre-op Performing Provider: CHRISTIAN RESENDEZ  Apr 22, 2024       María Elena is a 68 year old, presenting for the following:  Pre-Op Exam (DOS 4/24/24 Dr. Gurrola colonoscopy at [facility] )        4/22/2024     9:00 AM   Additional Questions   Roomed by KATHERINE Flores     Surgical Information  Surgery/Procedure: Colonoscopy  Surgery Location: [facility]   Surgeon: Galileo  Surgery Date: 4/24/24  Time of Surgery: 0820  Where patient plans to recover: At home with family  Fax number for surgical facility: Note does not need to be faxed, will be available electronically in Epic.    Assessment & Plan     The proposed surgical procedure is considered LOW risk.    (Z12.11) Screen for colon cancer  (primary encounter diagnosis)  Comment: Here for preoperative clearance  Plan:     (Z01.818) Pre-op exam  Comment: For preoperative clearance for colonoscopy  Plan: EKG 12-lead complete w/read - Clinics, CBC with        platelets, Basic metabolic panel                           Recommendation  APPROVAL GIVEN to proceed with proposed procedure pending review of diagnostic evaluation.          Subjective       HPI related to upcoming procedure: For preoperative clearance for colonoscopy        4/17/2024    10:35 AM   Preop Questions   1. Have you ever had a heart attack or stroke? No   2. Have you ever had surgery on your heart or blood vessels, such as a stent placement, a coronary artery bypass, or surgery on an artery in your head, neck, heart, or legs? No   3. Do you have chest pain with activity? No   4. Do you have a history of  heart failure? No   5. Do you currently have a cold, bronchitis or symptoms of other infection? No   6. Do you have a cough, shortness of breath, or wheezing? No   7. Do you or anyone in your family have  previous history of blood clots? No   8. Do you or does anyone in your family have a serious bleeding problem such as prolonged bleeding following surgeries or cuts? No   9. Have you ever had problems with anemia or been told to take iron pills? No   10. Have you had any abnormal blood loss such as black, tarry or bloody stools, or abnormal vaginal bleeding? No   11. Have you ever had a blood transfusion? No   12. Are you willing to have a blood transfusion if it is medically needed before, during, or after your surgery? Yes   13. Have you or any of your relatives ever had problems with anesthesia? No   14. Do you have sleep apnea, excessive snoring or daytime drowsiness? No   15. Do you have any artifical heart valves or other implanted medical devices like a pacemaker, defibrillator, or continuous glucose monitor? No   16. Do you have artificial joints? No   17. Are you allergic to latex? No       Health Care Directive  Patient does not have a Health Care Directive or Living Will: Patient states has Advance Directive and will bring in a copy to clinic.    Preoperative Review of    reviewed - no record of controlled substances prescribed.          Patient Active Problem List    Diagnosis Date Noted    Class 3 obesity (H) 11/20/2023     Priority: Medium    Extrinsic asthma 03/07/2022     Priority: Medium    Gastroesophageal reflux disease 03/07/2022     Priority: Medium    History of adenomatous polyp of colon 03/07/2022     Priority: Medium    Irritable bowel syndrome 03/07/2022     Priority: Medium    Obesity 03/07/2022     Priority: Medium    Osteopenia 03/07/2022     Priority: Medium    Cholecystitis 03/07/2022     Priority: Medium    Dyslipidemia 03/07/2022     Priority: Medium    Prediabetes 03/11/2013     Priority: Medium      Past Medical History:   Diagnosis Date    Arthritis     Uncomplicated asthma      Past Surgical History:   Procedure Laterality Date    ABDOMEN SURGERY      CHOLECYSTECTOMY       COLONOSCOPY      GYN SURGERY      ORTHOPEDIC SURGERY       Current Outpatient Medications   Medication Sig Dispense Refill    amitriptyline (ELAVIL) 10 MG tablet Take 1 tablet (10 mg) by mouth at bedtime 90 tablet 3    atorvastatin (LIPITOR) 20 MG tablet Take 1 tablet by mouth once daily 90 tablet 1    CALCIUM-VITAMIN D PO Take 1 tablet by mouth      CINNAMON PO       EPINEPHrine (ANY BX GENERIC EQUIV) 0.3 MG/0.3ML injection 2-pack Inject 0.3 mLs (0.3 mg) into the muscle as needed for anaphylaxis 2 each 1    EQ ALLERGY RELIEF, CETIRIZINE, 10 MG tablet Take 1 tablet by mouth once daily 90 tablet 3    fluticasone-vilanterol (BREO ELLIPTA) 200-25 MCG/ACT inhaler Inhale 1 puff into the lungs daily 3 each 3    Ginger, Zingiber officinalis, (GINGER ROOT) 550 MG CAPS capsule Take 550 mg by mouth daily      hyoscyamine (LEVSIN) 0.125 MG tablet Take 1 tablet (125 mcg) by mouth every 6 hours as needed for cramping 30 tablet 5    lactobacillus rhamnosus, GG, (CULTURELL) capsule       levalbuterol (XOPENEX HFA) 45 MCG/ACT inhaler Inhale 2 puffs into the lungs every 4 hours as needed for shortness of breath or wheezing 15 g 11    montelukast (SINGULAIR) 10 MG tablet Take 1 tablet (10 mg) by mouth every evening 90 tablet 3    multivitamin w/minerals (THERA-VIT-M) tablet Take 1 tablet by mouth daily      pantoprazole (PROTONIX) 40 MG EC tablet Take 1 tablet (40 mg) by mouth daily 90 tablet 3       Allergies   Allergen Reactions    Amoxicillin-Pot Clavulanate Hives    Doxycycline Hives    Azithromycin Nausea and Vomiting, Diarrhea and GI Disturbance           Metronidazole     Sulfa Antibiotics     Amoxicillin Hives and Rash        Social History     Tobacco Use    Smoking status: Never     Passive exposure: Never    Smokeless tobacco: Never   Substance Use Topics    Alcohol use: Not Currently       History   Drug Use Unknown   No family history of anesthesia problems or bleeding disorders      Review of Systems   Constitutional:  "Negative.    HENT: Negative.     Eyes: Negative.    Respiratory: Negative.     Cardiovascular: Negative.    Gastrointestinal: Negative.    Endocrine: Negative.    Genitourinary: Negative.    Neurological: Negative.    Hematological: Negative.    Psychiatric/Behavioral: Negative.           Objective    /76 (BP Location: Right arm, Patient Position: Sitting, Cuff Size: Adult Large)   Pulse 84   Temp 98.2  F (36.8  C) (Tympanic)   Resp 18   Ht 1.537 m (5' 0.51\")   Wt 112.9 kg (249 lb)   LMP 02/01/2003 (Approximate)   SpO2 99%   BMI 47.81 kg/m     Estimated body mass index is 47.81 kg/m  as calculated from the following:    Height as of this encounter: 1.537 m (5' 0.51\").    Weight as of this encounter: 112.9 kg (249 lb).  Physical Exam  Vitals and nursing note reviewed.   HENT:      Head: Normocephalic and atraumatic.      Right Ear: Tympanic membrane and ear canal normal.      Left Ear: Tympanic membrane and ear canal normal.      Nose: Nose normal.      Mouth/Throat:      Mouth: Mucous membranes are moist.      Pharynx: No posterior oropharyngeal erythema.   Eyes:      Conjunctiva/sclera: Conjunctivae normal.   Cardiovascular:      Rate and Rhythm: Normal rate and regular rhythm.      Heart sounds: Normal heart sounds.   Pulmonary:      Effort: Pulmonary effort is normal.      Breath sounds: Normal breath sounds.   Abdominal:      Palpations: Abdomen is soft. There is no mass.      Tenderness: There is no abdominal tenderness.   Musculoskeletal:         General: Normal range of motion.      Cervical back: Normal range of motion and neck supple.   Lymphadenopathy:      Cervical: No cervical adenopathy.   Skin:     General: Skin is warm and dry.      Findings: No rash.   Neurological:      General: No focal deficit present.      Mental Status: She is alert.   Psychiatric:         Mood and Affect: Mood normal.         Behavior: Behavior normal.         Thought Content: Thought content normal.         " "Judgment: Judgment normal.           No results for input(s): \"HGB\", \"PLT\", \"INR\", \"NA\", \"POTASSIUM\", \"CR\", \"A1C\" in the last 13991 hours.     Diagnostics  Labs pending at this time.  Results will be reviewed when available.   EKG: appears normal, NSR, normal axis, normal intervals, no acute ST/T changes c/w ischemia, no LVH by voltage criteria, unchanged from previous tracings    Revised Cardiac Risk Index (RCRI)  The patient has the following serious cardiovascular risks for perioperative complications:   - No serious cardiac risks = 0 points     RCRI Interpretation: 0 points: Class I (very low risk - 0.4% complication rate)         Signed Electronically by: MELA Bach  Copy of this evaluation report is provided to requesting physician.         "

## 2024-04-23 ENCOUNTER — ANESTHESIA EVENT (OUTPATIENT)
Dept: SURGERY | Facility: CLINIC | Age: 69
End: 2024-04-23
Payer: MEDICARE

## 2024-04-24 ENCOUNTER — HOSPITAL ENCOUNTER (OUTPATIENT)
Facility: CLINIC | Age: 69
Discharge: HOME | End: 2024-04-24
Attending: INTERNAL MEDICINE | Admitting: INTERNAL MEDICINE
Payer: MEDICARE

## 2024-04-24 ENCOUNTER — ANESTHESIA (OUTPATIENT)
Dept: SURGERY | Facility: CLINIC | Age: 69
End: 2024-04-24
Payer: MEDICARE

## 2024-04-24 VITALS
HEART RATE: 75 BPM | RESPIRATION RATE: 16 BRPM | SYSTOLIC BLOOD PRESSURE: 112 MMHG | DIASTOLIC BLOOD PRESSURE: 55 MMHG | OXYGEN SATURATION: 100 % | TEMPERATURE: 98.1 F

## 2024-04-24 LAB — COLONOSCOPY: NORMAL

## 2024-04-24 PROCEDURE — 88305 TISSUE EXAM BY PATHOLOGIST: CPT | Mod: TC | Performed by: INTERNAL MEDICINE

## 2024-04-24 PROCEDURE — 250N000011 HC RX IP 250 OP 636: Performed by: ANESTHESIOLOGY

## 2024-04-24 PROCEDURE — 710N000012 HC RECOVERY PHASE 2, PER MINUTE: Performed by: INTERNAL MEDICINE

## 2024-04-24 PROCEDURE — 258N000003 HC RX IP 258 OP 636: Performed by: ANESTHESIOLOGY

## 2024-04-24 PROCEDURE — 360N000075 HC SURGERY LEVEL 2, PER MIN: Performed by: INTERNAL MEDICINE

## 2024-04-24 PROCEDURE — 272N000001 HC OR GENERAL SUPPLY STERILE: Performed by: INTERNAL MEDICINE

## 2024-04-24 PROCEDURE — 999N000141 HC STATISTIC PRE-PROCEDURE NURSING ASSESSMENT: Performed by: INTERNAL MEDICINE

## 2024-04-24 PROCEDURE — 370N000017 HC ANESTHESIA TECHNICAL FEE, PER MIN: Performed by: INTERNAL MEDICINE

## 2024-04-24 RX ORDER — ONDANSETRON 4 MG/1
4 TABLET, ORALLY DISINTEGRATING ORAL EVERY 6 HOURS PRN
Status: DISCONTINUED | OUTPATIENT
Start: 2024-04-24 | End: 2024-04-24 | Stop reason: HOSPADM

## 2024-04-24 RX ORDER — NALOXONE HYDROCHLORIDE 0.4 MG/ML
0.4 INJECTION, SOLUTION INTRAMUSCULAR; INTRAVENOUS; SUBCUTANEOUS
Status: DISCONTINUED | OUTPATIENT
Start: 2024-04-24 | End: 2024-04-24 | Stop reason: HOSPADM

## 2024-04-24 RX ORDER — PROPOFOL 10 MG/ML
INJECTION, EMULSION INTRAVENOUS PRN
Status: DISCONTINUED | OUTPATIENT
Start: 2024-04-24 | End: 2024-04-24

## 2024-04-24 RX ORDER — ONDANSETRON 2 MG/ML
4 INJECTION INTRAMUSCULAR; INTRAVENOUS EVERY 30 MIN PRN
Status: DISCONTINUED | OUTPATIENT
Start: 2024-04-24 | End: 2024-04-24 | Stop reason: HOSPADM

## 2024-04-24 RX ORDER — ONDANSETRON 2 MG/ML
INJECTION INTRAMUSCULAR; INTRAVENOUS PRN
Status: DISCONTINUED | OUTPATIENT
Start: 2024-04-24 | End: 2024-04-24

## 2024-04-24 RX ORDER — FLUMAZENIL 0.1 MG/ML
0.2 INJECTION, SOLUTION INTRAVENOUS
Status: DISCONTINUED | OUTPATIENT
Start: 2024-04-24 | End: 2024-04-24 | Stop reason: HOSPADM

## 2024-04-24 RX ORDER — PROPOFOL 10 MG/ML
INJECTION, EMULSION INTRAVENOUS CONTINUOUS PRN
Status: DISCONTINUED | OUTPATIENT
Start: 2024-04-24 | End: 2024-04-24

## 2024-04-24 RX ORDER — NALOXONE HYDROCHLORIDE 0.4 MG/ML
0.1 INJECTION, SOLUTION INTRAMUSCULAR; INTRAVENOUS; SUBCUTANEOUS
Status: DISCONTINUED | OUTPATIENT
Start: 2024-04-24 | End: 2024-04-24 | Stop reason: HOSPADM

## 2024-04-24 RX ORDER — FLUTICASONE PROPIONATE 50 MCG
1 SPRAY, SUSPENSION (ML) NASAL DAILY
COMMUNITY

## 2024-04-24 RX ORDER — NALOXONE HYDROCHLORIDE 0.4 MG/ML
0.2 INJECTION, SOLUTION INTRAMUSCULAR; INTRAVENOUS; SUBCUTANEOUS
Status: DISCONTINUED | OUTPATIENT
Start: 2024-04-24 | End: 2024-04-24 | Stop reason: HOSPADM

## 2024-04-24 RX ORDER — ONDANSETRON 2 MG/ML
4 INJECTION INTRAMUSCULAR; INTRAVENOUS EVERY 6 HOURS PRN
Status: DISCONTINUED | OUTPATIENT
Start: 2024-04-24 | End: 2024-04-24 | Stop reason: HOSPADM

## 2024-04-24 RX ORDER — SODIUM CHLORIDE, SODIUM LACTATE, POTASSIUM CHLORIDE, CALCIUM CHLORIDE 600; 310; 30; 20 MG/100ML; MG/100ML; MG/100ML; MG/100ML
INJECTION, SOLUTION INTRAVENOUS CONTINUOUS
Status: DISCONTINUED | OUTPATIENT
Start: 2024-04-24 | End: 2024-04-24 | Stop reason: HOSPADM

## 2024-04-24 RX ORDER — ONDANSETRON 4 MG/1
4 TABLET, ORALLY DISINTEGRATING ORAL EVERY 30 MIN PRN
Status: DISCONTINUED | OUTPATIENT
Start: 2024-04-24 | End: 2024-04-24 | Stop reason: HOSPADM

## 2024-04-24 RX ORDER — LIDOCAINE 40 MG/G
CREAM TOPICAL
Status: DISCONTINUED | OUTPATIENT
Start: 2024-04-24 | End: 2024-04-24 | Stop reason: HOSPADM

## 2024-04-24 RX ORDER — PROCHLORPERAZINE MALEATE 5 MG
5 TABLET ORAL EVERY 6 HOURS PRN
Status: DISCONTINUED | OUTPATIENT
Start: 2024-04-24 | End: 2024-04-24 | Stop reason: HOSPADM

## 2024-04-24 RX ADMIN — SODIUM CHLORIDE, POTASSIUM CHLORIDE, SODIUM LACTATE AND CALCIUM CHLORIDE: 600; 310; 30; 20 INJECTION, SOLUTION INTRAVENOUS at 07:37

## 2024-04-24 RX ADMIN — PROPOFOL 30 MG: 10 INJECTION, EMULSION INTRAVENOUS at 08:23

## 2024-04-24 RX ADMIN — PROPOFOL 150 MCG/KG/MIN: 10 INJECTION, EMULSION INTRAVENOUS at 08:23

## 2024-04-24 RX ADMIN — ONDANSETRON 4 MG: 2 INJECTION INTRAMUSCULAR; INTRAVENOUS at 08:23

## 2024-04-24 RX ADMIN — PHENYLEPHRINE HYDROCHLORIDE 100 MCG: 10 INJECTION INTRAVENOUS at 08:45

## 2024-04-24 ASSESSMENT — ACTIVITIES OF DAILY LIVING (ADL)
ADLS_ACUITY_SCORE: 35

## 2024-04-24 ASSESSMENT — ENCOUNTER SYMPTOMS
SEIZURES: 0
DYSRHYTHMIAS: 0

## 2024-04-24 ASSESSMENT — COPD QUESTIONNAIRES: COPD: 0

## 2024-04-24 ASSESSMENT — LIFESTYLE VARIABLES: TOBACCO_USE: 0

## 2024-04-24 NOTE — ANESTHESIA CARE TRANSFER NOTE
Patient: María Elena Whitten    Procedure: Procedure(s):  COLONOSCOPY WITH POLYPECTOMIES       Diagnosis: Screening for colon cancer [Z12.11]  Diagnosis Additional Information: No value filed.    Anesthesia Type:   MAC     Note:    Oropharynx: oropharynx clear of all foreign objects and spontaneously breathing  Level of Consciousness: awake  Oxygen Supplementation: face mask  Level of Supplemental Oxygen (L/min / FiO2): 10  Independent Airway: airway patency satisfactory and stable  Dentition: dentition unchanged  Vital Signs Stable: post-procedure vital signs reviewed and stable  Report to RN Given: handoff report given  Patient transferred to: Phase II    Handoff Report: Identifed the Patient, Identified the Reponsible Provider, Reviewed the pertinent medical history, Discussed the surgical course, Reviewed Intra-OP anesthesia mangement and issues during anesthesia, Set expectations for post-procedure period and Allowed opportunity for questions and acknowledgement of understanding      Vitals:  Vitals Value Taken Time   /74    Temp 36    Pulse 78 04/24/24 0856   Resp 12    SpO2 100 % 04/24/24 0856   Vitals shown include unfiled device data.    Electronically Signed By: RAMÓN Bradley CRNA  April 24, 2024  8:58 AM

## 2024-04-24 NOTE — H&P
ENDOSCOPY PRE-PROCEDURE HISTORY & PHYSICAL    CHIEF COMPLAINT / REASON FOR PROCEDURE:  hx of colon polyps    PERTINENT HISTORY   Past Medical History:   Diagnosis Date    Arthritis     Motion sickness     Uncomplicated asthma      Past Surgical History:   Procedure Laterality Date    ABDOMEN SURGERY      CHOLECYSTECTOMY      COLONOSCOPY      GYN SURGERY      ORTHOPEDIC SURGERY       Other:  None  Bleeding tendencies:  None    Relevant Family History:  None    Relevant Social History:  None    A relevant Review of Systems was performed and was negative.    ALLERGIES/SENSITIVITIES:   Allergies   Allergen Reactions    Amoxicillin-Pot Clavulanate Hives    Doxycycline Hives    Azithromycin Nausea and Vomiting, Diarrhea and GI Disturbance           Metronidazole     Sulfa Antibiotics     Amoxicillin Hives and Rash        CURRENT MEDICATIONS:     Medications Prior to Admission   Medication Sig Dispense Refill Last Dose    amitriptyline (ELAVIL) 10 MG tablet Take 1 tablet (10 mg) by mouth at bedtime 90 tablet 3 4/23/2024    atorvastatin (LIPITOR) 20 MG tablet Take 1 tablet by mouth once daily 90 tablet 1 4/23/2024    CALCIUM-VITAMIN D PO Take 1 tablet by mouth   4/23/2024    CINNAMON PO    4/23/2024    EPINEPHrine (ANY BX GENERIC EQUIV) 0.3 MG/0.3ML injection 2-pack Inject 0.3 mLs (0.3 mg) into the muscle as needed for anaphylaxis 2 each 1 Unknown    EQ ALLERGY RELIEF, CETIRIZINE, 10 MG tablet Take 1 tablet by mouth once daily 90 tablet 3 4/23/2024    fluticasone (FLONASE) 50 MCG/ACT nasal spray Spray 1 spray into both nostrils daily   4/24/2024    fluticasone-vilanterol (BREO ELLIPTA) 200-25 MCG/ACT inhaler Inhale 1 puff into the lungs daily 3 each 3 4/24/2024    Ginger, Zingiber officinalis, (GINGER ROOT) 550 MG CAPS capsule Take 550 mg by mouth daily   4/23/2024    hyoscyamine (LEVSIN) 0.125 MG tablet Take 1 tablet (125 mcg) by mouth every 6 hours as needed for cramping 30 tablet 5 More than a month    lactobacillus  rhamnosus, GG, (CULTURELL) capsule    4/23/2024    levalbuterol (XOPENEX HFA) 45 MCG/ACT inhaler Inhale 2 puffs into the lungs every 4 hours as needed for shortness of breath or wheezing 15 g 11 Past Month    montelukast (SINGULAIR) 10 MG tablet Take 1 tablet (10 mg) by mouth every evening 90 tablet 3 4/23/2024    multivitamin w/minerals (THERA-VIT-M) tablet Take 1 tablet by mouth daily   4/23/2024    pantoprazole (PROTONIX) 40 MG EC tablet Take 1 tablet (40 mg) by mouth daily 90 tablet 3 4/23/2024           LABORATORY:   CMP Results:   Recent Labs   Lab 04/22/24  0947      POTASSIUM 3.9   CHLORIDE 106   CO2 25   *   BUN 15.4   CR 0.79      CBC  Recent Labs   Lab 04/22/24  0947   WBC 4.0   RBC 4.44   HGB 13.0   HCT 40.3   MCV 91   RDW 13.9        INRNo lab results found in last 7 days.       I reviewed the patient's pertinent imaging test results.       RELEVANT EXAM:     BP (!) 142/62   Pulse 80   Temp 97.6  F (36.4  C) (Temporal)   Resp 16   LMP 02/01/2003 (Approximate)   SpO2 98%    Lung Exam:   Normal  Heart Exam:  Normal  Airway Exam:  Normal  Mental Status:  Patient understands indications, risks and benefits and wishes to proceed with the colonoscopy.  Mallampati:II  Previous reaction to anesthesia/sedation:   None  Sedation plan based on assessment:  Moderate  Comment(s):  None    Consent signed by:  The patient.    ASA Classification:  3    IMPRESSION:      hx of colon polyps    PLAN:      Colonoscopy    Osvaldo Gurrola DO  [facility]  [phone]

## 2024-04-24 NOTE — ANESTHESIA POSTPROCEDURE EVALUATION
Patient: María Elena Whitten    Procedure: Procedure(s):  COLONOSCOPY WITH POLYPECTOMIES       Anesthesia Type:  MAC    Note:  Disposition: Outpatient   Postop Pain Control: Uneventful            Sign Out: Well controlled pain   PONV: No   Neuro/Psych: Uneventful            Sign Out: Acceptable/Baseline neuro status   Airway/Respiratory: Uneventful            Sign Out: Acceptable/Baseline resp. status   CV/Hemodynamics: Uneventful            Sign Out: Acceptable CV status; No obvious hypovolemia; No obvious fluid overload   Other NRE: NONE   DID A NON-ROUTINE EVENT OCCUR? No           Last vitals:  Vitals Value Taken Time   /59 04/24/24 0910   Temp 36.7  C (98.1  F) 04/24/24 0909   Pulse 75 04/24/24 0911   Resp 16 04/24/24 0909   SpO2 99 % 04/24/24 0911   Vitals shown include unfiled device data.    Electronically Signed By: Marta St MD  April 24, 2024  9:41 AM

## 2024-04-24 NOTE — ANESTHESIA PREPROCEDURE EVALUATION
Anesthesia Pre-Procedure Evaluation    Patient: María Elena Whitten   MRN: [id number] : 1955        Procedure : Procedure(s):  COLONOSCOPY          Past Medical History:   Diagnosis Date    Arthritis     Motion sickness     Uncomplicated asthma       Past Surgical History:   Procedure Laterality Date    ABDOMEN SURGERY      CHOLECYSTECTOMY      COLONOSCOPY      GYN SURGERY      ORTHOPEDIC SURGERY        Allergies   Allergen Reactions    Amoxicillin-Pot Clavulanate Hives    Doxycycline Hives    Azithromycin Nausea and Vomiting, Diarrhea and GI Disturbance           Metronidazole     Sulfa Antibiotics     Amoxicillin Hives and Rash      Social History     Tobacco Use    Smoking status: Never     Passive exposure: Never    Smokeless tobacco: Never   Substance Use Topics    Alcohol use: Not Currently      Wt Readings from Last 1 Encounters:   24 112.9 kg (249 lb)        Anesthesia Evaluation   Pt has had prior anesthetic.     History of anesthetic complications  - motion sickness.      ROS/MED HX  ENT/Pulmonary:     (+)     [place] risk factors,                 asthma               (-) tobacco use, COPD and recent URI   Neurologic:    (-) no seizures and no CVA   Cardiovascular:     (+) Dyslipidemia - -   -  - -                                   (-) hypertension, taking anticoagulants/antiplatelets, syncope and arrhythmias   METS/Exercise Tolerance: >4 METS    Hematologic:    (-) anemia   Musculoskeletal:       GI/Hepatic:     (+) GERD,                   Renal/Genitourinary:    (-) renal disease   Endo:     (+)               Obesity (BMI 48),    (-) Type I DM, Type II DM and thyroid disease   Psychiatric/Substance Use:    (-) chronic opioid use history   Infectious Disease:    (-) Recent Fever   Malignancy:       Other:            Physical Exam    Airway        Mallampati: II   TM distance: > 3 FB   Neck ROM: full   Mouth opening: > 3 cm    Respiratory Devices and Support         Dental     Comment: Fair dentition,  "no loose or removable teeth        Cardiovascular          Rhythm and rate: regular and normal     Pulmonary           breath sounds clear to auscultation           OUTSIDE LABS:  CBC:   Lab Results   Component Value Date    WBC 4.0 04/22/2024    WBC 8.1 04/06/2019    HGB 13.0 04/22/2024    HGB 13.4 04/06/2019    HCT 40.3 04/22/2024    HCT 40.7 04/06/2019     04/22/2024     04/06/2019     BMP:   Lab Results   Component Value Date     04/22/2024     03/09/2022    POTASSIUM 3.9 04/22/2024    POTASSIUM 3.9 03/09/2022    CHLORIDE 106 04/22/2024    CHLORIDE 108 03/09/2022    CO2 25 04/22/2024    CO2 27 03/09/2022    BUN 15.4 04/22/2024    BUN 15 03/09/2022    CR 0.79 04/22/2024    CR 0.85 03/09/2022     (H) 04/22/2024     (H) 05/01/2023     COAGS: No results found for: \"PTT\", \"INR\", \"FIBR\"  POC: No results found for: \"BGM\", \"HCG\", \"HCGS\"  HEPATIC:   Lab Results   Component Value Date    ALBUMIN 3.7 03/09/2022    PROTTOTAL 7.4 03/09/2022    ALT 44 03/09/2022    AST 37 03/09/2022    ALKPHOS 91 03/09/2022    BILITOTAL 0.6 03/09/2022     OTHER:   Lab Results   Component Value Date    RAFAELA 9.4 04/22/2024       Anesthesia Plan    ASA Status:  3    NPO Status:  NPO Appropriate    Anesthesia Type: MAC.              Consents    Anesthesia Plan(s) and associated risks, benefits, and realistic alternatives discussed. Questions answered and patient/representative(s) expressed understanding.     - Discussed:     - Discussed with:  Patient      - Extended Intubation/Ventilatory Support Discussed: No.      - Patient is DNR/DNI Status: No     Use of blood products discussed: No .     Postoperative Care            Comments:    Other Comments: Propofol gtt only            Marta St MD    I have reviewed the pertinent notes and labs in the chart from the past 30 days and (re)examined the patient.  Any updates or changes from those notes are reflected in this note.              # Severe " "Obesity: Estimated body mass index is 47.81 kg/m  as calculated from the following:    Height as of 4/22/24: 1.537 m (5' 0.51\").    Weight as of 4/22/24: 112.9 kg (249 lb).      "

## 2024-04-25 LAB
PATH REPORT.COMMENTS IMP SPEC: NORMAL
PATH REPORT.COMMENTS IMP SPEC: NORMAL
PATH REPORT.FINAL DX SPEC: NORMAL
PATH REPORT.GROSS SPEC: NORMAL
PATH REPORT.MICROSCOPIC SPEC OTHER STN: NORMAL
PATH REPORT.RELEVANT HX SPEC: NORMAL
PHOTO IMAGE: NORMAL

## 2024-04-25 PROCEDURE — 88305 TISSUE EXAM BY PATHOLOGIST: CPT | Mod: 26 | Performed by: PATHOLOGY

## 2024-05-31 ENCOUNTER — TELEPHONE (OUTPATIENT)
Dept: FAMILY MEDICINE | Facility: CLINIC | Age: 69
End: 2024-05-31
Payer: COMMERCIAL

## 2024-05-31 NOTE — TELEPHONE ENCOUNTER
General Call - High Priority    Contacts         Type Contact Phone/Fax    05/31/2024 09:14 AM CDT Phone (Incoming) María Elena Whitten (Self) [phone] (H)          Reason for Call:    My hearing aid  is not charging my hearing aids. There are no lights on the  (green blinking nor solid green).  Patient said she puts the aids in the charging unit at night (no lights on) and come morning the aids are not charged.    What are your questions or concerns:    Is it the aids or the charging unit?   Should I drop them off at the clinic for you to look at before my appointment?  I do have a 6 month follow up with you scheduled on 6/11/24 at 8:30 am.    Please Advise.   Thank you.    Date of last appointment with provider: 11/2023    Could we send this information to you in [employer] or would you prefer to receive a phone call?:   No preference   Okay to leave a detailed message?: Yes at Cell number on file:    Telephone Information:   Mobile [phone]

## 2024-06-03 NOTE — TELEPHONE ENCOUNTER
Reason for Call: PT DROP OFF HEARING AIDS IN CLINIC  Patient came in to [facility] today, spoke with the  regarding hearing aide concerns and brought in hearing aids in box to be given to Trinity Li as requested by provider. Pt has upcoming appointment on 06/11/2024 with Trinity Li.    Future Appointments 6/3/2024 - 11/30/2024        Date Visit Type Length Department Provider     6/10/2024  2:15 PM ANNUAL WELLNESS 30 min [facility] FP/IM/Calixto King MD    Location Instructions:     [facility] is located at [address], one block north of [facility] and the [facility] [place]. The clinic shares a building with the [facility]; use the clinic s parking lot and entrance on the building s south side.               6/11/2024  8:30 AM RETURN 30 min [facility] AUDIOLOGY Leighann Li AuD    Location Instructions:     [address] [place] [id number]              11/27/2024 11:30 AM [facility] MEDICARE ANNUAL WELLNESS 30 min [facility] FP/IM/PEDS Calixto Pacheco MD    Location Instructions:     [facility] is located at [facility], one block north of [facility] and the [facility] [place]. The clinic shares a building with the [facility]; use the clinic s parking lot and entrance on the building s south side.

## 2024-06-05 ENCOUNTER — TELEPHONE (OUTPATIENT)
Dept: FAMILY MEDICINE | Facility: CLINIC | Age: 69
End: 2024-06-05
Payer: COMMERCIAL

## 2024-06-05 SDOH — HEALTH STABILITY: PHYSICAL HEALTH: ON AVERAGE, HOW MANY MINUTES DO YOU ENGAGE IN EXERCISE AT THIS LEVEL?: 50 MIN

## 2024-06-05 SDOH — HEALTH STABILITY: PHYSICAL HEALTH: ON AVERAGE, HOW MANY DAYS PER WEEK DO YOU ENGAGE IN MODERATE TO STRENUOUS EXERCISE (LIKE A BRISK WALK)?: 5 DAYS

## 2024-06-05 ASSESSMENT — SOCIAL DETERMINANTS OF HEALTH (SDOH): HOW OFTEN DO YOU GET TOGETHER WITH FRIENDS OR RELATIVES?: TWICE A WEEK

## 2024-06-05 NOTE — TELEPHONE ENCOUNTER
Prior Authorization Retail Medication Request    Medication/Dose: fluticasone-vilanterol (BREO ELLIPTA) 200-25 MCG/ACT inhaler  Diagnosis and ICD code (if different than what is on RX):    New/renewal/insurance change PA/secondary ins. PA:  Previously Tried and Failed:  See Chart  Rationale:  See Chart    Insurance   Primary:   Insurance ID:      Secondary (if applicable):  Insurance ID:      Pharmacy Information (if different than what is on RX)  Name:    Phone:    Fax:

## 2024-06-06 ASSESSMENT — ASTHMA QUESTIONNAIRES
QUESTION_3 LAST FOUR WEEKS HOW OFTEN DID YOUR ASTHMA SYMPTOMS (WHEEZING, COUGHING, SHORTNESS OF BREATH, CHEST TIGHTNESS OR PAIN) WAKE YOU UP AT NIGHT OR EARLIER THAN USUAL IN THE MORNING: NOT AT ALL
ACT_TOTALSCORE: 21
QUESTION_5 LAST FOUR WEEKS HOW WOULD YOU RATE YOUR ASTHMA CONTROL: WELL CONTROLLED
QUESTION_4 LAST FOUR WEEKS HOW OFTEN HAVE YOU USED YOUR RESCUE INHALER OR NEBULIZER MEDICATION (SUCH AS ALBUTEROL): NOT AT ALL
QUESTION_2 LAST FOUR WEEKS HOW OFTEN HAVE YOU HAD SHORTNESS OF BREATH: ONCE OR TWICE A WEEK
QUESTION_1 LAST FOUR WEEKS HOW MUCH OF THE TIME DID YOUR ASTHMA KEEP YOU FROM GETTING AS MUCH DONE AT WORK, SCHOOL OR AT HOME: SOME OF THE TIME
ACT_TOTALSCORE: 21

## 2024-06-10 ENCOUNTER — OFFICE VISIT (OUTPATIENT)
Dept: FAMILY MEDICINE | Facility: CLINIC | Age: 69
End: 2024-06-10
Payer: COMMERCIAL

## 2024-06-10 VITALS
HEIGHT: 61 IN | RESPIRATION RATE: 12 BRPM | SYSTOLIC BLOOD PRESSURE: 122 MMHG | OXYGEN SATURATION: 98 % | DIASTOLIC BLOOD PRESSURE: 60 MMHG | BODY MASS INDEX: 46.78 KG/M2 | WEIGHT: 247.8 LBS | HEART RATE: 75 BPM

## 2024-06-10 DIAGNOSIS — E66.813 CLASS 3 OBESITY: ICD-10-CM

## 2024-06-10 DIAGNOSIS — Z13.6 SCREENING FOR CARDIOVASCULAR CONDITION: ICD-10-CM

## 2024-06-10 DIAGNOSIS — M25.50 MULTIPLE JOINT PAIN: Primary | ICD-10-CM

## 2024-06-10 DIAGNOSIS — J30.89 ENVIRONMENTAL AND SEASONAL ALLERGIES: ICD-10-CM

## 2024-06-10 LAB
CHOLEST SERPL-MCNC: 173 MG/DL
CRP SERPL-MCNC: <3 MG/L
FASTING STATUS PATIENT QL REPORTED: NO
HDLC SERPL-MCNC: 72 MG/DL
LDLC SERPL CALC-MCNC: 83 MG/DL
NONHDLC SERPL-MCNC: 101 MG/DL
RHEUMATOID FACT SERPL-ACNC: <10 IU/ML
TRIGL SERPL-MCNC: 91 MG/DL
URATE SERPL-MCNC: 3.4 MG/DL (ref 2.4–5.7)

## 2024-06-10 PROCEDURE — 86431 RHEUMATOID FACTOR QUANT: CPT | Performed by: FAMILY MEDICINE

## 2024-06-10 PROCEDURE — 84550 ASSAY OF BLOOD/URIC ACID: CPT | Performed by: FAMILY MEDICINE

## 2024-06-10 PROCEDURE — 80061 LIPID PANEL: CPT | Performed by: FAMILY MEDICINE

## 2024-06-10 PROCEDURE — 36415 COLL VENOUS BLD VENIPUNCTURE: CPT | Performed by: FAMILY MEDICINE

## 2024-06-10 PROCEDURE — 86038 ANTINUCLEAR ANTIBODIES: CPT | Performed by: FAMILY MEDICINE

## 2024-06-10 PROCEDURE — 99214 OFFICE O/P EST MOD 30 MIN: CPT | Performed by: FAMILY MEDICINE

## 2024-06-10 PROCEDURE — 86140 C-REACTIVE PROTEIN: CPT | Performed by: FAMILY MEDICINE

## 2024-06-10 NOTE — PROGRESS NOTES
"  Assessment & Plan     Multiple joint pain  Patient with swelling at the right hand and some pain on the left hand along with low back pain, family history of gout, will check labs as ordered.  Discussed medication that can be useful for controlling pain.  Monitor for overuse.  Follow-up as needed  - RHEUMATOID FACTOR; Future  - Anti Nuclear Jessica IgG by IFA with Reflex; Future  - CRP INFLAMMATION; Future  - URIC ACID; Future  - RHEUMATOID FACTOR  - Anti Nuclear Jessica IgG by IFA with Reflex  - CRP INFLAMMATION  - URIC ACID    Environmental and seasonal allergies  Exacerbated this year, recommend that she could try switching to a different antihistamine or trying to use the cetirizine twice a day instead.  She has previously seen an allergist and not interested in this at this time.  Will follow-up as needed    Class 3 obesity (H)  Patient is exercising regularly, weight overall is down from previous high, encouraged to continue healthy diet and exercise    Screening for cardiovascular condition  Labs pending  - Lipid panel reflex to direct LDL Non-fasting; Future  - Lipid panel reflex to direct LDL Non-fasting              BMI  Estimated body mass index is 47.58 kg/m  as calculated from the following:    Height as of this encounter: 1.537 m (5' 0.51\").    Weight as of this encounter: 112.4 kg (247 lb 12.8 oz).       Counseling  Appropriate preventive services were discussed with this patient, including applicable screening as appropriate for fall prevention, nutrition, physical activity, Tobacco-use cessation, weight loss and cognition.  Checklist reviewing preventive services available has been given to the patient.  Reviewed patient's diet, addressing concerns and/or questions.   The patient was provided with written information regarding signs of hearing loss.   Information on urinary incontinence and treatment options given to patient.           Triston Ring is a 68 year old, presenting for the following " "health issues:  Recheck Medication (Amitriptyline - ) and Arthritis        6/10/2024     2:08 PM   Additional Questions   Roomed by Gabbie LONGORIA CMA   Accompanied by None     1. Allergies reviewed. Have been worse this spring. Reviewed medications.  Has previously seen allergist, not interested in allergy shots, discussed antihistamines.  2. Increased arthritis in the hands, particularly MCP joint at 2nd finger right hand, joints get red and hot. Also notes chronic low back pain. Disruptive to activity.  Particularly bad this time a year when she likes to be gardening.  Wonders about underlying diagnoses.    History of Present Illness       Reason for visit:  Review medications and arthritis    She eats 2-3 servings of fruits and vegetables daily.She consumes 0 sweetened beverage(s) daily.She exercises with enough effort to increase her heart rate 30 to 60 minutes per day.  She exercises with enough effort to increase her heart rate 5 days per week.   She is taking medications regularly.                Objective    /60   Pulse 75   Resp 12   Ht 1.537 m (5' 0.51\")   Wt 112.4 kg (247 lb 12.8 oz)   LMP 02/01/2003 (Within Days)   SpO2 98%   BMI 47.58 kg/m    Body mass index is 47.58 kg/m .  Physical Exam     Alert cooperative in no acute distress  Normocephalic atraumatic mucous membranes moist  On exam of her hand there is swelling and degenerative change particularly to the second MCP joint on her right hand with milder arthritic changes to other MCP joints              Signed Electronically by: Calixto Pacheco MD    "

## 2024-06-11 ENCOUNTER — APPOINTMENT (OUTPATIENT)
Dept: AUDIOLOGY | Facility: CLINIC | Age: 69
End: 2024-06-11
Payer: COMMERCIAL

## 2024-06-11 DIAGNOSIS — Z53.9 ERRONEOUS ENCOUNTER--DISREGARD: Primary | ICD-10-CM

## 2024-06-11 LAB — ANA SER QL IF: NEGATIVE

## 2024-06-14 NOTE — TELEPHONE ENCOUNTER
Central Prior Authorization Team   Phone: [phone]    PA Initiation    Medication: fluticasone-vilanterol (BREO ELLIPTA) 200-25 MCG/ACT inhaler  Insurance Company: [employer] - Phone [phone] Fax [phone]  Pharmacy Filling the Rx: [facility] [address] [place] [address] [address]  Filling Pharmacy Phone: [phone]  Filling Pharmacy Fax:    Start Date: 6/14/2024

## 2024-06-17 NOTE — TELEPHONE ENCOUNTER
Prior Authorization Approval    Authorization Effective Date: 6/14/2024  Authorization Expiration Date: 12/31/2024  Medication: fluticasone-vilanterol (BREO ELLIPTA) 200-25 MCG/ACT inhaler-PA APPROVED   Approved Dose/Quantity:   Reference #:     Insurance Company: [employer] - Phone [phone] Fax [phone]  Expected CoPay:       CoPay Card Available:      Foundation Assistance Needed:    Which Pharmacy is filling the prescription (Not needed for infusion/clinic administered): [id number] - [place], [place] - [address]  Pharmacy Notified:  Yes- **Instructed pharmacy to notify patient when script is ready to /ship.**-  Patient Notified:  Yes

## 2024-09-09 ENCOUNTER — OFFICE VISIT (OUTPATIENT)
Dept: FAMILY MEDICINE | Facility: CLINIC | Age: 69
End: 2024-09-09
Payer: COMMERCIAL

## 2024-09-09 ENCOUNTER — ANCILLARY PROCEDURE (OUTPATIENT)
Dept: GENERAL RADIOLOGY | Facility: CLINIC | Age: 69
End: 2024-09-09
Attending: PHYSICIAN ASSISTANT
Payer: COMMERCIAL

## 2024-09-09 VITALS
HEART RATE: 83 BPM | TEMPERATURE: 97.6 F | OXYGEN SATURATION: 97 % | DIASTOLIC BLOOD PRESSURE: 70 MMHG | SYSTOLIC BLOOD PRESSURE: 124 MMHG | RESPIRATION RATE: 18 BRPM

## 2024-09-09 DIAGNOSIS — J30.89 ENVIRONMENTAL AND SEASONAL ALLERGIES: ICD-10-CM

## 2024-09-09 DIAGNOSIS — M25.551 PAIN IN JOINT INVOLVING PELVIC REGION AND THIGH, RIGHT: Primary | ICD-10-CM

## 2024-09-09 DIAGNOSIS — G89.11 ACUTE SHOULDER PAIN DUE TO TRAUMA, RIGHT: ICD-10-CM

## 2024-09-09 DIAGNOSIS — M25.511 ACUTE SHOULDER PAIN DUE TO TRAUMA, RIGHT: ICD-10-CM

## 2024-09-09 DIAGNOSIS — M25.551 PAIN IN JOINT INVOLVING PELVIC REGION AND THIGH, RIGHT: ICD-10-CM

## 2024-09-09 PROCEDURE — 72170 X-RAY EXAM OF PELVIS: CPT | Mod: TC | Performed by: RADIOLOGY

## 2024-09-09 PROCEDURE — 73030 X-RAY EXAM OF SHOULDER: CPT | Mod: TC | Performed by: RADIOLOGY

## 2024-09-09 PROCEDURE — 99214 OFFICE O/P EST MOD 30 MIN: CPT | Performed by: PHYSICIAN ASSISTANT

## 2024-09-09 RX ORDER — PREDNISONE 20 MG/1
20 TABLET ORAL DAILY
Qty: 7 TABLET | Refills: 0 | Status: SHIPPED | OUTPATIENT
Start: 2024-09-09 | End: 2024-09-18

## 2024-09-09 ASSESSMENT — ENCOUNTER SYMPTOMS: WHEEZING: 1

## 2024-09-15 ENCOUNTER — OFFICE VISIT (OUTPATIENT)
Dept: URGENT CARE | Facility: URGENT CARE | Age: 69
End: 2024-09-15
Payer: COMMERCIAL

## 2024-09-15 VITALS
HEART RATE: 87 BPM | SYSTOLIC BLOOD PRESSURE: 166 MMHG | BODY MASS INDEX: 48.96 KG/M2 | OXYGEN SATURATION: 99 % | WEIGHT: 255 LBS | TEMPERATURE: 98.1 F | DIASTOLIC BLOOD PRESSURE: 80 MMHG | RESPIRATION RATE: 20 BRPM

## 2024-09-15 DIAGNOSIS — R07.89 TIGHTNESS IN CHEST: Primary | ICD-10-CM

## 2024-09-15 PROCEDURE — 99213 OFFICE O/P EST LOW 20 MIN: CPT

## 2024-09-15 ASSESSMENT — ENCOUNTER SYMPTOMS
HEADACHES: 1
COUGH: 1

## 2024-09-15 ASSESSMENT — LIFESTYLE VARIABLES: SMOKING_STATUS: 0

## 2024-09-15 NOTE — PROGRESS NOTES
"  Assessment & Plan     Tightness in chest  Patient with chest tightness given the 15-minute episode associate with diaphoresis during the night we will have her go to the ER  So workup can be completed.  Most likely underlying allergies and asthma given her history but she is already on 20 mg of prednisone.  She understands the decision removal evaluated right now      BMI  Estimated body mass index is 48.96 kg/m  as calculated from the following:    Height as of 6/10/24: 1.537 m (5' 0.51\").    Weight as of this encounter: 115.7 kg (255 lb).             No follow-ups on file.    Subjective   María Elena is a 69 year old, presenting for the following health issues: Patient notes that she has had symptoms for 10 days now with congestion and cough.  Some shortness of breath.  She was put on prednisone last Monday.  With 15 minutes of chest tightness and diaphoresis.  No fevers.  No cardiac history.  She is a non-smoker.  Cough (Chest congestion. Allergies flaring. )      9/15/2024     9:13 AM   Additional Questions   Roomed by srud   Accompanied by n/a     Cough  This is a new problem. The current episode started more than 1 week ago. The problem occurs hourly. The problem has been gradually worsening. The cough is Productive of sputum. There has been no fever. Associated symptoms include headaches. She has tried decongestants for the symptoms. She is not a smoker. Her past medical history is significant for asthma.                  Review of Systems  Constitutional, HEENT, cardiovascular, pulmonary, gi and gu systems are negative, except as otherwise noted.      Objective    BP (!) 166/80 (BP Location: Right arm, Patient Position: Sitting)   Pulse 87   Temp 98.1  F (36.7  C) (Tympanic)   Resp 20   Wt 115.7 kg (255 lb)   LMP 02/01/2003 (Within Days)   SpO2 99%   BMI 48.96 kg/m    Body mass index is 48.96 kg/m .  Physical Exam   Patient alert no acute distress neck supple lungs clear heart regular rate rhythm without " murmur legs reveal no edema            Signed Electronically by: [facility]

## 2024-09-18 ENCOUNTER — OFFICE VISIT (OUTPATIENT)
Dept: FAMILY MEDICINE | Facility: CLINIC | Age: 69
End: 2024-09-18
Payer: COMMERCIAL

## 2024-09-18 VITALS
SYSTOLIC BLOOD PRESSURE: 134 MMHG | RESPIRATION RATE: 18 BRPM | OXYGEN SATURATION: 98 % | HEART RATE: 85 BPM | DIASTOLIC BLOOD PRESSURE: 80 MMHG | TEMPERATURE: 98.7 F

## 2024-09-18 DIAGNOSIS — R06.09 DOE (DYSPNEA ON EXERTION): ICD-10-CM

## 2024-09-18 DIAGNOSIS — R07.89 OTHER CHEST PAIN: Primary | ICD-10-CM

## 2024-09-18 PROCEDURE — 99213 OFFICE O/P EST LOW 20 MIN: CPT | Performed by: PHYSICIAN ASSISTANT

## 2024-09-18 NOTE — PROGRESS NOTES
"  Assessment & Plan     (R07.89) Other chest pain  (primary encounter diagnosis)  Comment: Will get echo as suggested by ED physician  Plan: Echocardiogram Complete            (R06.09) BANUELOS (dyspnea on exertion)  Comment: Some dyspnea on exertion will get echo  Plan: Echocardiogram Complete  She will call us 1 year and we will get repeat CT            MED REC REQUIRED  Post Medication Reconciliation Status:   BMI  Estimated body mass index is 48.96 kg/m  as calculated from the following:    Height as of 6/10/24: 1.537 m (5' 0.51\").    Weight as of 9/15/24: 115.7 kg (255 lb).             Triston Ring is a 69 year old, presenting for the following health issues:  ER F/U (9/15/24 [facility] acute chest pain/lung nodule, discuss order for echo and repeat lung scan in 1 year )        9/18/2024     9:55 AM   Additional Questions   Roomed by KATHERINE Flores     Patient here to follow-up for recent emergency room visit for chest pain  She is not currently having chest pain  She has mild dyspnea upon exertion  She had a chest CT to rule out PE she did not have PE but they found an incidental lung nodule she needs a repeat chest CT in 1 year I told her to put this on the calendar and then notify us and we can get that scheduled  Emergency provider had recommended an echocardiogram as well           ED/UC Followup:    Facility:  [facility]  Date of visit: 9/15/24  Reason for visit: acute chest pain/lung nodule   Current Status:             Objective    /80   Pulse 85   Temp 98.7  F (37.1  C) (Tympanic)   Resp 18   LMP 02/01/2003 (Within Days)   SpO2 98%   There is no height or weight on file to calculate BMI.  Physical Exam   Attentive no acute distress  Vital signs are stable she is afebrile  Lungs clear well ventilated no wheeze rales rhonchi  Cardiovascular regular rhythm no murmurs noted  No peripheral edema            Signed Electronically by: MELA Bach    "

## 2024-10-03 ENCOUNTER — HOSPITAL ENCOUNTER (OUTPATIENT)
Dept: CARDIOLOGY | Facility: CLINIC | Age: 69
Discharge: HOME | End: 2024-10-03
Attending: PHYSICIAN ASSISTANT | Admitting: PHYSICIAN ASSISTANT
Payer: MEDICARE

## 2024-10-03 DIAGNOSIS — R06.09 DOE (DYSPNEA ON EXERTION): ICD-10-CM

## 2024-10-03 DIAGNOSIS — R07.89 OTHER CHEST PAIN: ICD-10-CM

## 2024-10-03 LAB — LVEF ECHO: NORMAL

## 2024-10-03 PROCEDURE — 93306 TTE W/DOPPLER COMPLETE: CPT | Mod: 26 | Performed by: INTERNAL MEDICINE

## 2024-10-03 PROCEDURE — 93306 TTE W/DOPPLER COMPLETE: CPT

## 2024-10-06 ENCOUNTER — OFFICE VISIT (OUTPATIENT)
Dept: URGENT CARE | Facility: URGENT CARE | Age: 69
End: 2024-10-06
Payer: COMMERCIAL

## 2024-10-06 VITALS
BODY MASS INDEX: 48.39 KG/M2 | WEIGHT: 252 LBS | RESPIRATION RATE: 18 BRPM | HEART RATE: 93 BPM | DIASTOLIC BLOOD PRESSURE: 84 MMHG | TEMPERATURE: 98.4 F | OXYGEN SATURATION: 99 % | SYSTOLIC BLOOD PRESSURE: 150 MMHG

## 2024-10-06 DIAGNOSIS — J01.10 ACUTE NON-RECURRENT FRONTAL SINUSITIS: Primary | ICD-10-CM

## 2024-10-06 DIAGNOSIS — R42 VERTIGO: ICD-10-CM

## 2024-10-06 PROCEDURE — 99213 OFFICE O/P EST LOW 20 MIN: CPT | Performed by: PHYSICIAN ASSISTANT

## 2024-10-06 RX ORDER — CEFDINIR 300 MG/1
300 CAPSULE ORAL 2 TIMES DAILY
Qty: 14 CAPSULE | Refills: 0 | Status: SHIPPED | OUTPATIENT
Start: 2024-10-06 | End: 2024-10-13

## 2024-10-06 RX ORDER — MECLIZINE HYDROCHLORIDE 25 MG/1
25 TABLET ORAL 3 TIMES DAILY PRN
Qty: 20 TABLET | Refills: 0 | Status: SHIPPED | OUTPATIENT
Start: 2024-10-06

## 2024-10-06 NOTE — PATIENT INSTRUCTIONS
If you are having ongoing room spinning dizziness that is not improving with the medication please call and we can get you are referral to physical thearpy

## 2024-10-06 NOTE — PROGRESS NOTES
Assessment & Plan     Acute non-recurrent frontal sinusitis  Will treat with cefdinir as ordered. PT has tolerated in the past.  Continue flonase, antihistamine, singular, nasal saline.  RTC for persistent or worsening sx.     - cefdinir (OMNICEF) 300 MG capsule  Dispense: 14 capsule; Refill: 0    Vertigo  Currently improved, likely BPPV as it improved quickly and no hearing loss, aural fullness. Etiology and typical tx discussed.  Pt has no UE/LE/facial weakness or numbness, she is ambulating well and no severe ongoing vertigo thus low concern for CVA. Trial of meclizine as needed.    Caution re: rapid moving.    Offered PT, she is currently somewhat improved, will try the meclizine first but if persistent or worsening will call back in for a PT referral.      - meclizine (ANTIVERT) 25 MG tablet  Dispense: 20 tablet; Refill: 0       Silvia Howell PA-C  [facility] [place]    Triston Ring is a 69 year old female who presents to clinic today for the following health issues:  Chief Complaint   Patient presents with    Allergies    Dizziness     Room moved when getting up to go to the bathroom, when gets up and down         10/6/2024     9:27 AM   Additional Questions   Roomed by carey mukherjee     HPI    Got up in the middle of the night and had sudden onset of room spinning dizziness.    Lasted severe for minutes.  This morning sigificantly better but not 100%, still feels a little off.  No UE, LE weakness. Some paresthesia on the L arm.   Ongoing milder at this time.    No fevers.    No hearing change.   Some ringing that comes and goes.  No hearing loss from baseline.    Environmental allergy sx on flonase, antihistamine, singulair but ongoing for several weeks.    Over the last week facial pain, pressure in the face, thick green discharge out the nose. No sob, difficulty breathing.        Review of Systems  Constitutional, HEENT, cardiovascular, pulmonary, gi and gu systems are  negative, except as otherwise noted.      Objective    BP (!) 150/84   Pulse 93   Temp 98.4  F (36.9  C) (Tympanic)   Resp 18   Wt 114.3 kg (252 lb)   LMP 02/01/2003 (Within Days)   SpO2 99%   BMI 48.39 kg/m    Physical Exam   Pt is in no acute distress and appears well, RENDON well.    Able to rise up and off exam table without difficulty and ambulating halls without difficulty.   Ears patent B:  TM s intact, non-injected. All land marks easily visibile    Nasal mucosa is edematous, mucoid discharge.    Pharynx: non erythematous, tonsils non hypertrophied, No exudate   Neck supple: no adenopathy  Lungs: CTA  Heart: RRR, no murmur, no thrills or heaves   Ext: no edema  Skin: no rashes    TTP maxillary sinuses B.    Did not do hector jackson pike maneuver so as to not to reproduce sx as pt is  here by herself.    No results found for any visits on 10/06/24.

## 2024-10-27 DIAGNOSIS — E78.5 DYSLIPIDEMIA: ICD-10-CM

## 2024-10-28 RX ORDER — ATORVASTATIN CALCIUM 20 MG/1
20 TABLET, FILM COATED ORAL DAILY
Qty: 90 TABLET | Refills: 0 | Status: SHIPPED | OUTPATIENT
Start: 2024-10-28

## 2024-11-21 SDOH — HEALTH STABILITY: PHYSICAL HEALTH: ON AVERAGE, HOW MANY DAYS PER WEEK DO YOU ENGAGE IN MODERATE TO STRENUOUS EXERCISE (LIKE A BRISK WALK)?: 5 DAYS

## 2024-11-21 SDOH — HEALTH STABILITY: PHYSICAL HEALTH: ON AVERAGE, HOW MANY MINUTES DO YOU ENGAGE IN EXERCISE AT THIS LEVEL?: 30 MIN

## 2024-11-21 ASSESSMENT — ASTHMA QUESTIONNAIRES
QUESTION_5 LAST FOUR WEEKS HOW WOULD YOU RATE YOUR ASTHMA CONTROL: WELL CONTROLLED
ACT_TOTALSCORE: 22
QUESTION_1 LAST FOUR WEEKS HOW MUCH OF THE TIME DID YOUR ASTHMA KEEP YOU FROM GETTING AS MUCH DONE AT WORK, SCHOOL OR AT HOME: A LITTLE OF THE TIME
QUESTION_3 LAST FOUR WEEKS HOW OFTEN DID YOUR ASTHMA SYMPTOMS (WHEEZING, COUGHING, SHORTNESS OF BREATH, CHEST TIGHTNESS OR PAIN) WAKE YOU UP AT NIGHT OR EARLIER THAN USUAL IN THE MORNING: NOT AT ALL
QUESTION_4 LAST FOUR WEEKS HOW OFTEN HAVE YOU USED YOUR RESCUE INHALER OR NEBULIZER MEDICATION (SUCH AS ALBUTEROL): NOT AT ALL
QUESTION_2 LAST FOUR WEEKS HOW OFTEN HAVE YOU HAD SHORTNESS OF BREATH: ONCE OR TWICE A WEEK
ACT_TOTALSCORE: 22
EMERGENCY_ROOM_LAST_YEAR_TOTAL: TWO

## 2024-11-21 ASSESSMENT — SOCIAL DETERMINANTS OF HEALTH (SDOH): HOW OFTEN DO YOU GET TOGETHER WITH FRIENDS OR RELATIVES?: TWICE A WEEK

## 2024-11-25 ENCOUNTER — OFFICE VISIT (OUTPATIENT)
Dept: FAMILY MEDICINE | Facility: CLINIC | Age: 69
End: 2024-11-25
Attending: FAMILY MEDICINE
Payer: COMMERCIAL

## 2024-11-25 VITALS
BODY MASS INDEX: 47.05 KG/M2 | OXYGEN SATURATION: 98 % | DIASTOLIC BLOOD PRESSURE: 72 MMHG | SYSTOLIC BLOOD PRESSURE: 148 MMHG | HEIGHT: 61 IN | HEART RATE: 82 BPM | RESPIRATION RATE: 14 BRPM | WEIGHT: 249.2 LBS

## 2024-11-25 DIAGNOSIS — M54.41 ACUTE BILATERAL LOW BACK PAIN WITH BILATERAL SCIATICA: ICD-10-CM

## 2024-11-25 DIAGNOSIS — J45.40 MODERATE PERSISTENT EXTRINSIC ASTHMA WITHOUT COMPLICATION: ICD-10-CM

## 2024-11-25 DIAGNOSIS — K58.0 IRRITABLE BOWEL SYNDROME WITH DIARRHEA: ICD-10-CM

## 2024-11-25 DIAGNOSIS — M54.42 ACUTE BILATERAL LOW BACK PAIN WITH BILATERAL SCIATICA: ICD-10-CM

## 2024-11-25 DIAGNOSIS — R03.0 ELEVATED BLOOD PRESSURE READING WITHOUT DIAGNOSIS OF HYPERTENSION: ICD-10-CM

## 2024-11-25 DIAGNOSIS — Z91.09 ENVIRONMENTAL ALLERGIES: ICD-10-CM

## 2024-11-25 DIAGNOSIS — Z00.00 ENCOUNTER FOR MEDICARE ANNUAL WELLNESS EXAM: Primary | ICD-10-CM

## 2024-11-25 DIAGNOSIS — K21.9 GASTROESOPHAGEAL REFLUX DISEASE WITHOUT ESOPHAGITIS: ICD-10-CM

## 2024-11-25 DIAGNOSIS — E78.5 DYSLIPIDEMIA: ICD-10-CM

## 2024-11-25 PROCEDURE — 99214 OFFICE O/P EST MOD 30 MIN: CPT | Mod: 25 | Performed by: FAMILY MEDICINE

## 2024-11-25 PROCEDURE — G0439 PPPS, SUBSEQ VISIT: HCPCS | Performed by: FAMILY MEDICINE

## 2024-11-25 RX ORDER — EPINEPHRINE 0.3 MG/.3ML
0.3 INJECTION SUBCUTANEOUS PRN
Qty: 2 EACH | Refills: 1 | Status: SHIPPED | OUTPATIENT
Start: 2024-11-25

## 2024-11-25 RX ORDER — HYOSCYAMINE SULFATE 0.125 MG
0.12 TABLET ORAL EVERY 6 HOURS PRN
Qty: 30 TABLET | Refills: 5 | Status: SHIPPED | OUTPATIENT
Start: 2024-11-25

## 2024-11-25 RX ORDER — ATORVASTATIN CALCIUM 20 MG/1
20 TABLET, FILM COATED ORAL DAILY
Qty: 90 TABLET | Refills: 0 | Status: SHIPPED | OUTPATIENT
Start: 2024-11-25

## 2024-11-25 RX ORDER — PANTOPRAZOLE SODIUM 40 MG/1
40 TABLET, DELAYED RELEASE ORAL DAILY
Qty: 90 TABLET | Refills: 3 | Status: SHIPPED | OUTPATIENT
Start: 2024-11-25

## 2024-11-25 RX ORDER — AMITRIPTYLINE HYDROCHLORIDE 10 MG/1
10 TABLET ORAL AT BEDTIME
Qty: 90 TABLET | Refills: 3 | Status: SHIPPED | OUTPATIENT
Start: 2024-11-25

## 2024-11-25 RX ORDER — MONTELUKAST SODIUM 10 MG/1
1 TABLET ORAL EVERY EVENING
Qty: 90 TABLET | Refills: 3 | Status: SHIPPED | OUTPATIENT
Start: 2024-11-25

## 2024-11-25 RX ORDER — FLUTICASONE FUROATE AND VILANTEROL 200; 25 UG/1; UG/1
1 POWDER RESPIRATORY (INHALATION) DAILY
Qty: 3 EACH | Refills: 3 | Status: SHIPPED | OUTPATIENT
Start: 2024-11-25

## 2024-11-25 RX ORDER — LEVALBUTEROL TARTRATE 45 UG/1
2 AEROSOL, METERED ORAL EVERY 4 HOURS PRN
Qty: 15 G | Refills: 11 | Status: SHIPPED | OUTPATIENT
Start: 2024-11-25

## 2024-11-25 NOTE — PROGRESS NOTES
Preventive Care Visit  [facility]  Calixto Pacheco MD, Family Medicine  Nov 25, 2024      Assessment & Plan     Encounter for Medicare annual wellness exam  Health maintenance updated, preventive services reviewed, vaccines discussed, questions are answered, patient encouraged to continue annual wellness visits to review preventive services    Irritable bowel syndrome with diarrhea  Under good control, rarely needs hyoscyamine  - amitriptyline (ELAVIL) 10 MG tablet; Take 1 tablet (10 mg) by mouth at bedtime.  - hyoscyamine (LEVSIN) 0.125 MG tablet; Take 1 tablet (125 mcg) by mouth every 6 hours as needed for cramping.    Environmental allergies  No issues with allergic episodes, refilled epi for prn use  - EPINEPHrine (ANY BX GENERIC EQUIV) 0.3 MG/0.3ML injection 2-pack; Inject 0.3 mLs (0.3 mg) into the muscle as needed for anaphylaxis.    Gastroesophageal reflux disease without esophagitis  Stable on current regimen, no symptoms  - pantoprazole (PROTONIX) 40 MG EC tablet; Take 1 tablet (40 mg) by mouth daily.    Dyslipidemia  Stable on statin, labs up to date  - atorvastatin (LIPITOR) 20 MG tablet; Take 1 tablet (20 mg) by mouth daily.    Moderate persistent extrinsic asthma without complication  Stable with current regimen  - levalbuterol (XOPENEX HFA) 45 MCG/ACT inhaler; Inhale 2 puffs into the lungs every 4 hours as needed for shortness of breath or wheezing.  - montelukast (SINGULAIR) 10 MG tablet; Take 1 tablet (10 mg) by mouth every evening.  - fluticasone-vilanterol (BREO ELLIPTA) 200-25 MCG/ACT inhaler; Inhale 1 puff into the lungs daily.    Acute bilateral low back pain with bilateral sciatica  Patient with one month of low back pain, unclear cause without specific injury, will start with physical therapy, if not improving consider referral to [facility]  - Physical Therapy  Referral; Future    Elevated blood pressure reading without diagnosis of hypertension  BP  "elevated here, no issue known, start checking BP at home or follow up here for recheck            BMI  Estimated body mass index is 46.99 kg/m  as calculated from the following:    Height as of this encounter: 1.551 m (5' 1.06\").    Weight as of this encounter: 113 kg (249 lb 3.2 oz).       Counseling  Appropriate preventive services were addressed with this patient via screening, questionnaire, or discussion as appropriate for fall prevention, nutrition, physical activity, Tobacco-use cessation, social engagement, weight loss and cognition.  Checklist reviewing preventive services available has been given to the patient.  Reviewed patient's diet, addressing concerns and/or questions.   The patient was provided with written information regarding signs of hearing loss.   Information on urinary incontinence and treatment options given to patient.           Triston Ring is a 69 year old, presenting for the following:  Annual Visit        11/25/2024    12:33 PM   Additional Questions   Roomed by Gabbie LONGORIA CMA   Accompanied by None           AWV - preventive services reviewed  New onset of low back pain for past month, radiates into buttocks and legs, causes shooting pain worse into right side than left side, no particular injury, notices that it is worse when first gets out of bed, slowly improves with being up except for rare shooting pain  Also reviewed medications, labs up to date, asthma/allergies adequately controlled  Discussed blood pressure        Health Care Directive  Patient does not have a Health Care Directive: Patient states has Advance Directive and will bring in a copy to clinic.      11/21/2024   General Health   How would you rate your overall physical health? Good            11/21/2024   Nutrition   Diet: Low salt    Low fat/cholesterol       Multiple values from one day are sorted in reverse-chronological order         11/21/2024   Exercise   Days per week of moderate/strenous exercise 5 days "   Average minutes spent exercising at this level 30 min            11/21/2024   Social Factors   Frequency of gathering with friends or relatives Twice a week   Worry food won't last until get money to buy more No   Food not last or not have enough money for food? No   Do you have housing? (Housing is defined as stable permanent housing and does not include staying ouside in a car, in a tent, in an abandoned building, in an overnight shelter, or couch-surfing.) Yes   Are you worried about losing your housing? No   Lack of transportation? No   Unable to get utilities (heat,electricity)? No            11/25/2024   Fall Risk   Gait Speed Test (Document in seconds) 3.5   Gait Speed Test Interpretation Less than or equal to 5.00 seconds - PASS               11/21/2024   Activities of Daily Living- Home Safety   Needs help with the following daily activites None of the above   Safety concerns in the home None of the above            11/21/2024   Dental   Dentist two times every year? Yes            11/21/2024   Hearing Screening   Hearing concerns? (!) IT'S HARD TO FOLLOW A CONVERSATION IN A NOISY RESTAURANT OR CROWDED ROOM.    (!) TROUBLE UNDERSTANDING SOFT OR WHISPERED SPEECH.       Multiple values from one day are sorted in reverse-chronological order         11/21/2024   Driving Risk Screening   Patient/family members have concerns about driving No            11/21/2024   General Alertness/Fatigue Screening   Have you been more tired than usual lately? No            11/21/2024   Urinary Incontinence Screening   Bothered by leaking urine in past 6 months Yes            6/5/2024   TB Screening   Were you born outside of the [place]? No            Today's PHQ-2 Score:       11/25/2024    12:19 PM   PHQ-2 ( 1999 [employer])   Q1: Little interest or pleasure in doing things 0    Q2: Feeling down, depressed or hopeless 0    PHQ-2 Score 0    Q1: Little interest or pleasure in doing things Not at all   Q2: Feeling down, depressed or  hopeless Not at all   PHQ-2 Score 0       Patient-reported           11/21/2024   Substance Use   Alcohol more than 3/day or more than 7/wk Not Applicable   Do you have a current opioid prescription? No   How severe/bad is pain from 1 to 10? 5/10   Do you use any other substances recreationally? No        Social History     Tobacco Use    Smoking status: Never     Passive exposure: Past    Smokeless tobacco: Never   Vaping Use    Vaping status: Never Used   Substance Use Topics    Alcohol use: Not Currently    Drug use: Never           11/13/2023   LAST FHS-7 RESULTS   1st degree relative breast or ovarian cancer No    Any relative bilateral breast cancer No    Any male have breast cancer No    Any ONE woman have BOTH breast AND ovarian cancer No    Any woman with breast cancer before 50yrs No    2 or more relatives with breast AND/OR ovarian cancer No    2 or more relatives with breast AND/OR bowel cancer No        Patient-reported              History of abnormal Pap smear: Status post hysterectomy with removal of cervix and no history of CIN2 or greater or cervical cancer. Health Maintenance and Surgical History updated.       ASCVD Risk   The 10-year ASCVD risk score (No MELENDEZ, et al., 2019) is: 11.3%    Values used to calculate the score:      Age: 69 years      Sex: Female      Is Non- : No      Diabetic: No      Tobacco smoker: No      Systolic Blood Pressure: 148 mmHg      Is BP treated: No      HDL Cholesterol: 72 mg/dL      Total Cholesterol: 173 mg/dL            Reviewed and updated as needed this visit by Provider   Tobacco  Allergies  Meds  Problems  Med Hx  Surg Hx  Fam Hx     Sexual Activity            Current providers sharing in care for this patient include:  Patient Care Team:  Calixto Pacheco MD as PCP - General (Family Medicine)  Calixto Pacheco MD as Assigned PCP    The following health maintenance items are reviewed in Epic and correct as of  "today:  Health Maintenance   Topic Date Due    ASTHMA ACTION PLAN  11/14/2023    ASTHMA CONTROL TEST  05/25/2025    LIPID  06/10/2025    MEDICARE ANNUAL WELLNESS VISIT  11/25/2025    ANNUAL REVIEW OF HM ORDERS  11/25/2025    FALL RISK ASSESSMENT  11/25/2025    DEXA  02/01/2026    MAMMO SCREENING  11/15/2026    GLUCOSE  04/22/2027    COLORECTAL CANCER SCREENING  04/24/2027    DTAP/TDAP/TD IMMUNIZATION (3 - Td or Tdap) 01/23/2028    ADVANCE CARE PLANNING  11/25/2029    PHQ-2 (once per calendar year)  Completed    INFLUENZA VACCINE  Completed    Pneumococcal Vaccine: 65+ Years  Completed    ZOSTER IMMUNIZATION  Completed    RSV VACCINE  Completed    COVID-19 Vaccine  Completed    HPV IMMUNIZATION  Aged Out    MENINGITIS IMMUNIZATION  Aged Out    RSV MONOCLONAL ANTIBODY  Aged Out    HEPATITIS C SCREENING  Discontinued            Objective    Exam  BP (!) 148/72   Pulse 82   Resp 14   Ht 1.551 m (5' 1.06\")   Wt 113 kg (249 lb 3.2 oz)   LMP 02/01/2003 (Within Days)   SpO2 98%   BMI 46.99 kg/m     Estimated body mass index is 46.99 kg/m  as calculated from the following:    Height as of this encounter: 1.551 m (5' 1.06\").    Weight as of this encounter: 113 kg (249 lb 3.2 oz).    Physical Exam  GENERAL: alert and no distress  EYES: Eyes grossly normal to inspection, PERRL and conjunctivae and sclerae normal  HENT: ear canals and TM's normal, nose and mouth without ulcers or lesions  NECK: no adenopathy, no asymmetry, masses, or scars  RESP: lungs clear to auscultation - no rales, rhonchi or wheezes  CV: regular rate and rhythm, normal S1 S2, no S3 or S4, no murmur, click or rub, no peripheral edema  ABDOMEN: soft, nontender, no hepatosplenomegaly, no masses and bowel sounds normal  MS: no gross musculoskeletal defects noted, no edema  SKIN: no suspicious lesions or rashes  NEURO: Normal strength and tone, mentation intact and speech normal  PSYCH: mentation appears normal, affect normal/bright         11/25/2024 "   Mini Cog   Clock Draw Score 2 Normal   3 Item Recall 3 objects recalled   Mini Cog Total Score 5                 Signed Electronically by: Calixto Pacheco MD

## 2024-11-25 NOTE — PATIENT INSTRUCTIONS
You have been referred to Physical Therapy. You can contact the physical therapy office directly to schedule your appointment at the number below.    Radha Stinson  - [phone]    If you have any questions or you need further assistance, please contact the clinic at [phone].          Patient Education   Preventive Care Advice   This is general advice given by our system to help you stay healthy. However, your care team may have specific advice just for you. Please talk to your care team about your preventive care needs.  Nutrition  Eat 5 or more servings of fruits and vegetables each day.  Try wheat bread, brown rice and whole grain pasta (instead of white bread, rice, and pasta).  Get enough calcium and vitamin D. Check the label on foods and aim for 100% of the RDA (recommended daily allowance).  Lifestyle  Exercise at least 150 minutes each week  (30 minutes a day, 5 days a week).  Do muscle strengthening activities 2 days a week. These help control your weight and prevent disease.  No smoking.  Wear sunscreen to prevent skin cancer.  Have a dental exam and cleaning every 6 months.  Yearly exams  See your health care team every year to talk about:  Any changes in your health.  Any medicines your care team has prescribed.  Preventive care, family planning, and ways to prevent chronic diseases.  Shots (vaccines)   HPV shots (up to age 26), if you've never had them before.  Hepatitis B shots (up to age 59), if you've never had them before.  COVID-19 shot: Get this shot when it's due.  Flu shot: Get a flu shot every year.  Tetanus shot: Get a tetanus shot every 10 years.  Pneumococcal, hepatitis A, and RSV shots: Ask your care team if you need these based on your risk.  Shingles shot (for age 50 and up)  General health tests  Diabetes screening:  Starting at age 35, Get screened for diabetes at least every 3 years.  If you are younger than age 35, ask your care team if you should be screened for  diabetes.  Cholesterol test: At age 39, start having a cholesterol test every 5 years, or more often if advised.  Bone density scan (DEXA): At age 50, ask your care team if you should have this scan for osteoporosis (brittle bones).  Hepatitis C: Get tested at least once in your life.  STIs (sexually transmitted infections)  Before age 24: Ask your care team if you should be screened for STIs.  After age 24: Get screened for STIs if you're at risk. You are at risk for STIs (including HIV) if:  You are sexually active with more than one person.  You don't use condoms every time.  You or a partner was diagnosed with a sexually transmitted infection.  If you are at risk for HIV, ask about PrEP medicine to prevent HIV.  Get tested for HIV at least once in your life, whether you are at risk for HIV or not.  Cancer screening tests  Cervical cancer screening: If you have a cervix, begin getting regular cervical cancer screening tests starting at age 21.  Breast cancer scan (mammogram): If you've ever had breasts, begin having regular mammograms starting at age 40. This is a scan to check for breast cancer.  Colon cancer screening: It is important to start screening for colon cancer at age 45.  Have a colonoscopy test every 10 years (or more often if you're at risk) Or, ask your provider about stool tests like a FIT test every year or Cologuard test every 3 years.  To learn more about your testing options, visit:   .  For help making a decision, visit:   https://bit.ly/ei14156.  Prostate cancer screening test: If you have a prostate, ask your care team if a prostate cancer screening test (PSA) at age 55 is right for you.  Lung cancer screening: If you are a current or former smoker ages 50 to 80, ask your care team if ongoing lung cancer screenings are right for you.  For informational purposes only. Not to replace the advice of your health care provider. Copyright   2023 [employer]. All rights reserved.  Clinically reviewed by the [facility]. [place] 818425 - REV 01/24.  Preventing Falls: Care Instructions  Injuries and health problems such as trouble walking or poor eyesight can increase your risk of falling. So can some medicines. But there are things you can do to help prevent falls. You can exercise to get stronger. You can also arrange your home to make it safer.    Talk to your doctor about the medicines you take. Ask if any of them increase the risk of falls and whether they can be changed or stopped.   Try to exercise regularly. It can help improve your strength and balance. This can help lower your risk of falling.         Practice fall safety and prevention.   Wear low-heeled shoes that fit well and give your feet good support. Talk to your doctor if you have foot problems that make this hard.  Carry a cellphone or wear a medical alert device that you can use to call for help.  Use stepladders instead of chairs to reach high objects. Don't climb if you're at risk for falls. Ask for help, if needed.  Wear the correct eyeglasses, if you need them.        Make your home safer.   Remove rugs, cords, clutter, and furniture from walkways.  Keep your house well lit. Use night-lights in hallways and bathrooms.  Install and use sturdy handrails on stairways.  Wear nonskid footwear, even inside. Don't walk barefoot or in socks without shoes.        Be safe outside.   Use handrails, curb cuts, and ramps whenever possible.  Keep your hands free by using a shoulder bag or backpack.  Try to walk in well-lit areas. Watch out for uneven ground, changes in pavement, and debris.  Be careful in the winter. Walk on the grass or gravel when sidewalks are slippery. Use de-icer on steps and walkways. Add non-slip devices to shoes.    Put grab bars and nonskid mats in your shower or tub and near the toilet. Try to use a shower chair or bath bench when bathing.   Get into a tub or shower by putting in  "your weaker leg first. Get out with your strong side first. Have a phone or medical alert device in the bathroom with you.   Where can you learn more?  Go to https://www.[employer].net/patiented  Enter G117 in the search box to learn more about \"Preventing Falls: Care Instructions.\"  Current as of: July 17, 2023  Content Version: 14.2 2024 [facility].   Care instructions adapted under license by your healthcare professional. If you have questions about a medical condition or this instruction, always ask your healthcare professional. Healthwise[employer] Incorporated disclaims any warranty or liability for your use of this information.    Hearing Loss: Care Instructions  Overview     Hearing loss is a sudden or slow decrease in how well you hear. It can range from slight to profound. Permanent hearing loss can occur with aging. It also can happen when you are exposed long-term to loud noise. Examples include listening to loud music, riding motorcycles, or being around other loud machines.  Hearing loss can affect your work and home life. It can make you feel lonely or depressed. You may feel that you have lost your independence. But hearing aids and other devices can help you hear better and feel connected to others.  Follow-up care is a key part of your treatment and safety. Be sure to make and go to all appointments, and call your doctor if you are having problems. It's also a good idea to know your test results and keep a list of the medicines you take.  How can you care for yourself at home?  Avoid loud noises whenever possible. This helps keep your hearing from getting worse.  Always wear hearing protection around loud noises.  Wear a hearing aid as directed.  A professional can help you pick a hearing aid that will work best for you.  You can also get hearing aids over the counter for mild to moderate hearing loss.  Have hearing tests as your doctor suggests. They can show whether your hearing has " "changed. Your hearing aid may need to be adjusted.  Use other devices as needed. These may include:  Telephone amplifiers and hearing aids that can connect to a television, stereo, radio, or microphone.  Devices that use lights or vibrations. These alert you to the doorbell, a ringing telephone, or a baby monitor.  Television closed-captioning. This shows the words at the bottom of the screen. Most new TVs can do this.  TTY (text telephone). This lets you type messages back and forth on the telephone instead of talking or listening. These devices are also called TDD. When messages are typed on the keyboard, they are sent over the phone line to a receiving TTY. The message is shown on a monitor.  Use text messaging, social media, and email if it is hard for you to communicate by telephone.  Try to learn a listening technique called speechreading. It is not lipreading. You pay attention to people's gestures, expressions, posture, and tone of voice. These clues can help you understand what a person is saying. Face the person you are talking to, and have them face you. Make sure the lighting is good. You need to see the other person's face clearly.  Think about counseling if you need help to adjust to your hearing loss.  When should you call for help?  Watch closely for changes in your health, and be sure to contact your doctor if:    You think your hearing is getting worse.     You have new symptoms, such as dizziness or nausea.   Where can you learn more?  Go to https://www.[employer].net/patiented  Enter R798 in the search box to learn more about \"Hearing Loss: Care Instructions.\"  Current as of: September 27, 2023  Content Version: 14.2 2024 [facility] [employer].   Care instructions adapted under license by your healthcare professional. If you have questions about a medical condition or this instruction, always ask your healthcare professional. Healthwise, Incorporated disclaims any warranty or liability for " your use of this information.    Bladder Training: Care Instructions  Your Care Instructions     Bladder training is used to treat urge incontinence and stress incontinence. Urge incontinence means that the need to urinate comes on so fast that you can't get to a toilet in time. Stress incontinence means that you leak urine because of pressure on your bladder. For example, it may happen when you laugh, cough, or lift something heavy.  Bladder training can increase how long you can wait before you have to urinate. It can also help your bladder hold more urine. And it can give you better control over the urge to urinate.  It is important to remember that bladder training takes a few weeks to a few months to make a difference. You may not see results right away, but don't give up.  Follow-up care is a key part of your treatment and safety. Be sure to make and go to all appointments, and call your doctor if you are having problems. It's also a good idea to know your test results and keep a list of the medicines you take.  How can you care for yourself at home?  Work with your doctor to come up with a bladder training program that is right for you. You may use one or more of the following methods.  Delayed urination  In the beginning, try to keep from urinating for 5 minutes after you first feel the need to go.  While you wait, take deep, slow breaths to relax. Kegel exercises can also help you delay the need to go to the bathroom.  After some practice, when you can easily wait 5 minutes to urinate, try to wait 10 minutes before you urinate.  Slowly increase the waiting period until you are able to control when you have to urinate.  Scheduled urination  Empty your bladder when you first wake up in the morning.  Schedule times throughout the day when you will urinate.  Start by going to the bathroom every hour, even if you don't need to go.  Slowly increase the time between trips to the bathroom.  When you have found a  "schedule that works well for you, keep doing it.  If you wake up during the night and have to urinate, do it. Apply your schedule to waking hours only.  Kegel exercises  These tighten and strengthen pelvic muscles, which can help you control the flow of urine. (If doing these exercises causes pain, stop doing them and talk with your doctor.) To do Kegel exercises:  Squeeze your muscles as if you were trying not to pass gas. Or squeeze your muscles as if you were stopping the flow of urine. Your belly, legs, and buttocks shouldn't move.  Hold the squeeze for 3 seconds, then relax for 5 to 10 seconds.  Start with 3 seconds, then add 1 second each week until you are able to squeeze for 10 seconds.  Repeat the exercise 10 times a session. Do 3 to 8 sessions a day.  When should you call for help?  Watch closely for changes in your health, and be sure to contact your doctor if:    Your incontinence is getting worse.     You do not get better as expected.   Where can you learn more?  Go to https://www.[employer].net/patiented  Enter V684 in the search box to learn more about \"Bladder Training: Care Instructions.\"  Current as of: November 15, 2023  Content Version: 14.2 2024 [facility] [employer].   Care instructions adapted under license by your healthcare professional. If you have questions about a medical condition or this instruction, always ask your healthcare professional. Healthwise, Incorporated disclaims any warranty or liability for your use of this information.       "

## 2024-12-29 ENCOUNTER — OFFICE VISIT (OUTPATIENT)
Dept: URGENT CARE | Facility: URGENT CARE | Age: 69
End: 2024-12-29
Payer: COMMERCIAL

## 2024-12-29 VITALS
OXYGEN SATURATION: 99 % | WEIGHT: 249.12 LBS | RESPIRATION RATE: 18 BRPM | TEMPERATURE: 98.5 F | BODY MASS INDEX: 46.97 KG/M2 | HEART RATE: 81 BPM | DIASTOLIC BLOOD PRESSURE: 81 MMHG | SYSTOLIC BLOOD PRESSURE: 142 MMHG

## 2024-12-29 DIAGNOSIS — B34.9 VIRAL SYNDROME: ICD-10-CM

## 2024-12-29 DIAGNOSIS — R05.1 ACUTE COUGH: Primary | ICD-10-CM

## 2024-12-29 PROCEDURE — 99213 OFFICE O/P EST LOW 20 MIN: CPT | Performed by: PHYSICIAN ASSISTANT

## 2024-12-29 RX ORDER — BENZONATATE 100 MG/1
100 CAPSULE ORAL 3 TIMES DAILY PRN
Qty: 30 CAPSULE | Refills: 0 | Status: SHIPPED | OUTPATIENT
Start: 2024-12-29 | End: 2025-01-08

## 2024-12-29 RX ORDER — ALBUTEROL SULFATE 90 UG/1
2 INHALANT RESPIRATORY (INHALATION) EVERY 6 HOURS
Qty: 18 G | Refills: 0 | Status: SHIPPED | OUTPATIENT
Start: 2024-12-29 | End: 2025-01-28

## 2024-12-29 NOTE — PROGRESS NOTES
"Patient presents with:  URI: Cough, congestion-started yesterday but kept up from cough. Spitting up \"crap\"      Clinical Decision Making:  Treat with albuterol inhaler up to 4 times per day and Tessalon Perles as needed for use at nighttime. Expected course of resolution and indication for return was gone over and questions were answered to patient/parent's satisfaction before discharge.          ICD-10-CM    1. Acute cough  R05.1 albuterol (PROAIR HFA/PROVENTIL HFA/VENTOLIN HFA) 108 (90 Base) MCG/ACT inhaler     benzonatate (TESSALON) 100 MG capsule      2. Viral syndrome  B34.9           Patient Instructions   You were seen today for acute bronchitis. This is likely due to a viral illness.    Symptom management:  - Get plenty of rest  - Avoid smoking and second hand smoke  - May take tylenol or ibuprofen for fever/discomfort  - Drink plenty of non-caffeinated fluids  - Use nasal steroid spray for sinus congestion  - Albuterol inhaler may be used every 6 hours as needed for chest tightness      Reasons to be seen in the emergency room:  - Develop a fever of 100.4 or higher  - Cough changes, coughing up blood, or become short of breath  - Neck stiffness  - Chest pain  - Severe headache  - Unable to tolerate eating or drinking fluids    Otherwise, if no symptom improvement after 5 days, follow-up with your primary care provider.        HPI:  María Elena Whitten is a 69 year old female who presents today for cough that has been productive of off colored phlegm.  Patient says associated rhinorrhea.  Symptoms started since last night.  Currently the patient is denying headache, myalgias, arthralgias, sore throat, loss of taste or smell, head congestion, vomiting or diarrhea or anorexia.  COVID testing was performed at home and was returned as negative Friday, 3 days ago.  No reported sick contacts.  Treatment at home has been with rest and increased fluids.     History obtained from chart review and the patient.    Problem " List:  2024-06: Environmental and seasonal allergies  2023-11: Class 3 obesity  2022-03: Extrinsic asthma  2022-03: Gastroesophageal reflux disease  2022-03: History of adenomatous polyp of colon  2022-03: Irritable bowel syndrome  2022-03: Obesity  2022-03: Osteopenia  2022-03: Cholecystitis  2022-03: Dyslipidemia  2013-03: Prediabetes      Past Medical History:   Diagnosis Date    Arthritis     Motion sickness     Uncomplicated asthma        Social History     Tobacco Use    Smoking status: Never     Passive exposure: Past    Smokeless tobacco: Never   Substance Use Topics    Alcohol use: Not Currently       Review of Systems  As above in HPI otherwise negative.    Vitals:    12/29/24 1020   BP: (!) 142/81   BP Location: Right arm   Patient Position: Sitting   Cuff Size: Adult Large   Pulse: 81   Resp: 18   Temp: 98.5  F (36.9  C)   TempSrc: Tympanic   SpO2: 99%   Weight: 113 kg (249 lb 1.9 oz)       General: Patient is resting comfortably no acute distress is afebrile  HEENT: Head is normocephalic atraumatic   eyes are PERRL EOMI sclera anicteric   TMs are clear bilaterally  Throat is with mild pharyngeal wall erythema and no exudate  No cervical lymphadenopathy present  LUNGS: Bilateral mid lung fields with prolonged expiratory wheezes.  Normal respiratory effort and excursion.  HEART: Regular rate and rhythm  Skin: Without rash non-diaphoretic    Physical Exam    At the end of the encounter, I discussed results, diagnosis, medications. Discussed red flags for immediate return to clinic/ER, as well as indications for follow up if no improvement. Patient understood and agreed to plan. Patient was stable for discharge.

## 2024-12-30 DIAGNOSIS — J30.89 ENVIRONMENTAL AND SEASONAL ALLERGIES: Primary | ICD-10-CM

## 2024-12-31 RX ORDER — FLUTICASONE PROPIONATE 50 MCG
SPRAY, SUSPENSION (ML) NASAL
Qty: 48 G | Refills: 11 | Status: SHIPPED | OUTPATIENT
Start: 2024-12-31

## 2025-04-30 DIAGNOSIS — E78.5 DYSLIPIDEMIA: ICD-10-CM

## 2025-04-30 DIAGNOSIS — Z91.09 ENVIRONMENTAL ALLERGIES: ICD-10-CM

## 2025-05-01 RX ORDER — ATORVASTATIN CALCIUM 20 MG/1
20 TABLET, FILM COATED ORAL DAILY
Qty: 90 TABLET | Refills: 1 | Status: SHIPPED | OUTPATIENT
Start: 2025-05-01

## 2025-05-01 RX ORDER — CETIRIZINE HYDROCHLORIDE 10 MG/1
10 TABLET, FILM COATED ORAL DAILY
Qty: 90 TABLET | Refills: 1 | Status: SHIPPED | OUTPATIENT
Start: 2025-05-01

## 2025-06-19 ENCOUNTER — TELEPHONE (OUTPATIENT)
Dept: FAMILY MEDICINE | Facility: CLINIC | Age: 70
End: 2025-06-19
Payer: COMMERCIAL

## 2025-06-19 DIAGNOSIS — J45.40 MODERATE PERSISTENT EXTRINSIC ASTHMA WITHOUT COMPLICATION: Primary | ICD-10-CM

## 2025-06-19 RX ORDER — FLUTICASONE PROPIONATE AND SALMETEROL 250; 50 UG/1; UG/1
1 POWDER RESPIRATORY (INHALATION) EVERY 12 HOURS
Qty: 180 EACH | Refills: 3 | Status: SHIPPED | OUTPATIENT
Start: 2025-06-19

## 2025-06-19 NOTE — TELEPHONE ENCOUNTER
New Medication Request    Contacts       Contact Date/Time Type Contact Phone/Fax    06/19/2025 03:35 PM CDT Fax (Incoming) [facility] [address] (Pharmacy) [phone]            What medication are you requesting?: alternative to Breo Ellipta.      Reason for medication request: [employer] doesn't cover Breo Ellipta      Preferred Pharmacy:   [facility] [address]  [address]  [place] [address]  Phone: [phone] Fax: [phone]

## 2025-06-23 ENCOUNTER — TELEPHONE (OUTPATIENT)
Dept: FAMILY MEDICINE | Facility: CLINIC | Age: 70
End: 2025-06-23
Payer: COMMERCIAL

## 2025-06-23 NOTE — TELEPHONE ENCOUNTER
PA Initiation    Medication: FLUTICASONE-SALMETEROL 250-50 MCG/ACT IN AEPB  Insurance Company: [employer] - Phone [phone] Fax [phone]  Pharmacy Filling the Rx: [address] [place] - [facility] [address]  Filling Pharmacy Phone: [phone]  Filling Pharmacy Fax:    Start Date: 6/23/2025  Retail Pharmacy Prior Authorization Team   Phone: [phone]

## 2025-06-23 NOTE — TELEPHONE ENCOUNTER
Prior Authorization Retail Medication Request    Medication/Dose: fluticasone-salmeterol (ADVAIR) 250-50 MCG/ACT inhaler  Diagnosis and ICD code (if different than what is on RX):    New/renewal/insurance change PA/secondary ins. PA:  Previously Tried and Failed:    Rationale:      Insurance   Primary:     [employer]     Insurance ID:  [id number]     Secondary (if applicable):MEDICARE FOR HB SUPPLEMENT   Insurance ID:  [id number]     Pharmacy Information (if different than what is on RX)  Name:    Phone:    Fax:    Clinic Information  Preferred routing pool for dept communication: [facility] Pool

## 2025-06-24 NOTE — TELEPHONE ENCOUNTER
Prior Authorization Approval    Medication: FLUTICASONE-SALMETEROL 250-50 MCG/ACT IN AEPB  Authorization Effective Date: 6/23/2025  Authorization Expiration Date: 12/31/2025  Approved Dose/Quantity:   Reference #:     Insurance Company: [employer] - Phone [phone] Fax [phone]  Expected CoPay: $    CoPay Card Available:      Financial Assistance Needed: No  Which Pharmacy is filling the prescription: [facility] [address] [place] [address] [address]  Pharmacy Notified: Yes  Patient Notified: Instructed pharmacy to notify patient once order is ready.

## 2025-08-22 ASSESSMENT — ASTHMA QUESTIONNAIRES
QUESTION_5 LAST FOUR WEEKS HOW WOULD YOU RATE YOUR ASTHMA CONTROL: WELL CONTROLLED
QUESTION_3 LAST FOUR WEEKS HOW OFTEN DID YOUR ASTHMA SYMPTOMS (WHEEZING, COUGHING, SHORTNESS OF BREATH, CHEST TIGHTNESS OR PAIN) WAKE YOU UP AT NIGHT OR EARLIER THAN USUAL IN THE MORNING: NOT AT ALL
QUESTION_2 LAST FOUR WEEKS HOW OFTEN HAVE YOU HAD SHORTNESS OF BREATH: ONCE OR TWICE A WEEK
ACT_TOTALSCORE: 21
QUESTION_1 LAST FOUR WEEKS HOW MUCH OF THE TIME DID YOUR ASTHMA KEEP YOU FROM GETTING AS MUCH DONE AT WORK, SCHOOL OR AT HOME: A LITTLE OF THE TIME
QUESTION_4 LAST FOUR WEEKS HOW OFTEN HAVE YOU USED YOUR RESCUE INHALER OR NEBULIZER MEDICATION (SUCH AS ALBUTEROL): ONCE A WEEK OR LESS

## 2025-08-27 ENCOUNTER — OFFICE VISIT (OUTPATIENT)
Dept: FAMILY MEDICINE | Facility: CLINIC | Age: 70
End: 2025-08-27
Payer: COMMERCIAL

## 2025-08-27 VITALS
BODY MASS INDEX: 45.07 KG/M2 | WEIGHT: 229.6 LBS | OXYGEN SATURATION: 98 % | TEMPERATURE: 99.1 F | HEIGHT: 60 IN | DIASTOLIC BLOOD PRESSURE: 72 MMHG | HEART RATE: 76 BPM | SYSTOLIC BLOOD PRESSURE: 134 MMHG | RESPIRATION RATE: 12 BRPM

## 2025-08-27 DIAGNOSIS — E78.5 DYSLIPIDEMIA: ICD-10-CM

## 2025-08-27 DIAGNOSIS — M54.41 CHRONIC BILATERAL LOW BACK PAIN WITH BILATERAL SCIATICA: ICD-10-CM

## 2025-08-27 DIAGNOSIS — E66.813 CLASS 3 OBESITY (H): ICD-10-CM

## 2025-08-27 DIAGNOSIS — J45.40 MODERATE PERSISTENT EXTRINSIC ASTHMA WITHOUT COMPLICATION: ICD-10-CM

## 2025-08-27 DIAGNOSIS — K58.0 IRRITABLE BOWEL SYNDROME WITH DIARRHEA: Primary | ICD-10-CM

## 2025-08-27 DIAGNOSIS — G89.29 CHRONIC BILATERAL LOW BACK PAIN WITH BILATERAL SCIATICA: ICD-10-CM

## 2025-08-27 DIAGNOSIS — M85.80 OSTEOPENIA, UNSPECIFIED LOCATION: ICD-10-CM

## 2025-08-27 DIAGNOSIS — Z78.0 POSTMENOPAUSAL: ICD-10-CM

## 2025-08-27 DIAGNOSIS — M54.42 CHRONIC BILATERAL LOW BACK PAIN WITH BILATERAL SCIATICA: ICD-10-CM

## 2025-08-27 PROCEDURE — 99214 OFFICE O/P EST MOD 30 MIN: CPT | Performed by: FAMILY MEDICINE

## 2025-08-27 PROCEDURE — 3075F SYST BP GE 130 - 139MM HG: CPT | Performed by: FAMILY MEDICINE

## 2025-08-27 PROCEDURE — 3078F DIAST BP <80 MM HG: CPT | Performed by: FAMILY MEDICINE

## 2025-08-27 RX ORDER — ALBUTEROL SULFATE 90 UG/1
2 INHALANT RESPIRATORY (INHALATION) EVERY 6 HOURS
Qty: 18 G | Refills: 0 | Status: CANCELLED | OUTPATIENT
Start: 2025-08-27

## 2025-08-27 ASSESSMENT — ENCOUNTER SYMPTOMS: WHEEZING: 1

## 2025-09-02 ENCOUNTER — LAB (OUTPATIENT)
Dept: LAB | Facility: CLINIC | Age: 70
End: 2025-09-02
Payer: COMMERCIAL

## 2025-09-02 DIAGNOSIS — E78.5 DYSLIPIDEMIA: ICD-10-CM

## 2025-09-02 LAB
ANION GAP SERPL CALCULATED.3IONS-SCNC: 10 MMOL/L (ref 7–15)
BUN SERPL-MCNC: 16.9 MG/DL (ref 8–23)
CALCIUM SERPL-MCNC: 9.4 MG/DL (ref 8.8–10.4)
CHLORIDE SERPL-SCNC: 107 MMOL/L (ref 98–107)
CHOLEST SERPL-MCNC: 149 MG/DL
CREAT SERPL-MCNC: 0.77 MG/DL (ref 0.51–0.95)
EGFRCR SERPLBLD CKD-EPI 2021: 82 ML/MIN/1.73M2
FASTING STATUS PATIENT QL REPORTED: ABNORMAL
FASTING STATUS PATIENT QL REPORTED: NORMAL
GLUCOSE SERPL-MCNC: 119 MG/DL (ref 70–99)
HCO3 SERPL-SCNC: 25 MMOL/L (ref 22–29)
HDLC SERPL-MCNC: 57 MG/DL
LDLC SERPL CALC-MCNC: 75 MG/DL
NONHDLC SERPL-MCNC: 92 MG/DL
POTASSIUM SERPL-SCNC: 4 MMOL/L (ref 3.4–5.3)
SODIUM SERPL-SCNC: 142 MMOL/L (ref 135–145)
TRIGL SERPL-MCNC: 83 MG/DL

## 2025-09-02 PROCEDURE — 36415 COLL VENOUS BLD VENIPUNCTURE: CPT

## 2025-09-02 PROCEDURE — 80061 LIPID PANEL: CPT

## 2025-09-02 PROCEDURE — 80048 BASIC METABOLIC PNL TOTAL CA: CPT

## 2025-09-03 ENCOUNTER — MYC MEDICAL ADVICE (OUTPATIENT)
Dept: FAMILY MEDICINE | Facility: CLINIC | Age: 70
End: 2025-09-03
Payer: COMMERCIAL

## 2025-09-03 ENCOUNTER — RESULTS FOLLOW-UP (OUTPATIENT)
Dept: FAMILY MEDICINE | Facility: CLINIC | Age: 70
End: 2025-09-03
Payer: COMMERCIAL

## (undated) DEVICE — TUBING SUCTION MEDI-VAC 1/4"X20' N620A

## (undated) DEVICE — ENDO SNARE EXACTO COLD 9MM LOOP 2.4MMX230CM 00711115

## (undated) DEVICE — CLIP HEMOSTATIC ASSURANCE W11 MM 00711882

## (undated) DEVICE — SOL WATER IRRIG 1000ML BOTTLE 2F7114

## (undated) DEVICE — SUCTION MANIFOLD NEPTUNE 2 SYS 1 PORT 702-025-000